# Patient Record
Sex: FEMALE | Race: WHITE | Employment: FULL TIME | URBAN - METROPOLITAN AREA
[De-identification: names, ages, dates, MRNs, and addresses within clinical notes are randomized per-mention and may not be internally consistent; named-entity substitution may affect disease eponyms.]

---

## 2017-02-20 RX ORDER — TRAMADOL HYDROCHLORIDE 50 MG/1
50 TABLET ORAL
Qty: 60 TAB | Refills: 1 | OUTPATIENT
Start: 2017-02-20 | End: 2017-06-02 | Stop reason: SDUPTHER

## 2017-03-24 RX ORDER — DULOXETIN HYDROCHLORIDE 60 MG/1
CAPSULE, DELAYED RELEASE ORAL
Qty: 30 CAP | Refills: 11 | Status: SHIPPED | OUTPATIENT
Start: 2017-03-24 | End: 2018-03-23 | Stop reason: SDUPTHER

## 2017-03-24 RX ORDER — LISINOPRIL AND HYDROCHLOROTHIAZIDE 12.5; 2 MG/1; MG/1
TABLET ORAL
Qty: 30 TAB | Refills: 11 | Status: SHIPPED | OUTPATIENT
Start: 2017-03-24 | End: 2018-03-23 | Stop reason: SDUPTHER

## 2017-05-12 ENCOUNTER — HOSPITAL ENCOUNTER (EMERGENCY)
Age: 57
Discharge: HOME | End: 2017-05-12
Attending: FAMILY MEDICINE

## 2017-05-12 VITALS
SYSTOLIC BLOOD PRESSURE: 155 MMHG | OXYGEN SATURATION: 99 % | RESPIRATION RATE: 18 BRPM | TEMPERATURE: 98.1 F | HEIGHT: 64 IN | DIASTOLIC BLOOD PRESSURE: 89 MMHG | HEART RATE: 74 BPM | BODY MASS INDEX: 44.73 KG/M2 | WEIGHT: 262 LBS

## 2017-05-12 DIAGNOSIS — H66.90 ACUTE OTITIS MEDIA, UNSPECIFIED LATERALITY, UNSPECIFIED OTITIS MEDIA TYPE: Primary | ICD-10-CM

## 2017-05-12 RX ORDER — AMOXICILLIN 875 MG/1
875 TABLET, FILM COATED ORAL 2 TIMES DAILY
Qty: 20 TAB | Refills: 0 | Status: SHIPPED | OUTPATIENT
Start: 2017-05-12 | End: 2017-05-22

## 2017-05-12 NOTE — DISCHARGE INSTRUCTIONS
Ear Infection (Otitis Media): Care Instructions  Your Care Instructions    An ear infection may start with a cold and affect the middle ear (otitis media). It can hurt a lot. Most ear infections clear up on their own in a couple of days. Most often you will not need antibiotics. This is because many ear infections are caused by a virus. Antibiotics don't work against a virus. Regular doses of pain medicines are the best way to reduce your fever and help you feel better. Follow-up care is a key part of your treatment and safety. Be sure to make and go to all appointments, and call your doctor if you are having problems. It's also a good idea to know your test results and keep a list of the medicines you take. How can you care for yourself at home? · Take pain medicines exactly as directed. ¨ If the doctor gave you a prescription medicine for pain, take it as prescribed. ¨ If you are not taking a prescription pain medicine, take an over-the-counter medicine, such as acetaminophen (Tylenol), ibuprofen (Advil, Motrin), or naproxen (Aleve). Read and follow all instructions on the label. ¨ Do not take two or more pain medicines at the same time unless the doctor told you to. Many pain medicines have acetaminophen, which is Tylenol. Too much acetaminophen (Tylenol) can be harmful. · Plan to take a full dose of pain reliever before bedtime. Getting enough sleep will help you get better. · Try a warm, moist washcloth on the ear. It may help relieve pain. · If your doctor prescribed antibiotics, take them as directed. Do not stop taking them just because you feel better. You need to take the full course of antibiotics. When should you call for help? Call your doctor now or seek immediate medical care if:  · You have new or increasing ear pain. · You have new or increasing pus or blood draining from your ear. · You have a fever with a stiff neck or a severe headache.   Watch closely for changes in your health, and be sure to contact your doctor if:  · You have new or worse symptoms. · You are not getting better after taking an antibiotic for 2 days. Where can you learn more? Go to http://chandrakant-[place].info/. Enter Z094 in the search box to learn more about \"Ear Infection (Otitis Media): Care Instructions. \"  Current as of: July 29, 2016  Content Version: 11.2  © 0705-2132 [employer]. Care instructions adapted under license by [employer] (which disclaims liability or warranty for this information). If you have questions about a medical condition or this instruction, always ask your healthcare professional. [address] any warranty or liability for your use of this information.

## 2017-05-12 NOTE — UC PROVIDER NOTE
Patient is a 59 y.o. female presenting with ear pain. Ear Pain    This is a new problem. The current episode started more than 1 week ago. The problem occurs daily. The problem has not changed since onset. Patient complains that the right ear is affected. There has been no fever. Associated symptoms include abdominal pain. Pertinent negatives include no ear discharge, no headaches, no hearing loss, no rhinorrhea, no sore throat and no cough. Past Medical History:   Diagnosis Date    Arthritis     Knees    Environmental allergies     Hives     HTN (hypertension) 5/20/2013        Past Surgical History:   Procedure Laterality Date    HX GYN      3 pregnancies, 3 children         Family History   Problem Relation Age of Onset    Hypertension Mother     Diabetes Mother     Cancer Father      lung    Diabetes Sister     Hypertension Sister         Social History     Social History    Marital status:      Spouse name: N/A    Number of children: N/A    Years of education: N/A     Occupational History    Not on file. Social History Main Topics    Smoking status: Never Smoker    Smokeless tobacco: Never Used    Alcohol use Yes      Comment: rare    Drug use: No    Sexual activity: Yes     Birth control/ protection: None     Other Topics Concern    Not on file     Social History Narrative                ALLERGIES: Review of patient's allergies indicates no known allergies. Review of Systems   Constitutional: Negative for chills. HENT: Positive for congestion and ear pain. Negative for ear discharge, hearing loss, rhinorrhea and sore throat. Respiratory: Negative for cough. Gastrointestinal: Positive for abdominal pain. Neurological: Negative for headaches.        Vitals:    05/12/17 1425 05/12/17 1427   BP:  155/89   Pulse:  74   Resp:  18   Temp:  98.1 °F (36.7 °C)   SpO2:  99%   Weight: 118.8 kg (262 lb)    Height: 5' 4\" (1.626 m)        Physical Exam   Constitutional: She is oriented to person, place, and time. She appears well-developed and well-nourished. HENT:   Left Ear: External ear normal.   Right TM erythematous   Eyes: EOM are normal.   Neck: Normal range of motion. Neck supple. Cardiovascular: Normal rate, regular rhythm and normal heart sounds. Pulmonary/Chest: Effort normal and breath sounds normal. No respiratory distress. She has no wheezes. She has no rales. Neurological: She is alert and oriented to person, place, and time. Skin: Skin is warm and dry. Psychiatric: She has a normal mood and affect. Her behavior is normal. Judgment and thought content normal.   Nursing note and vitals reviewed. MDM     Differential Diagnosis; Clinical Impression; Plan:     CLINICAL IMPRESSION:  Acute otitis media, unspecified laterality, unspecified otitis media type  (primary encounter diagnosis)    Plan:  1. Amoxil 875  2.   3.   Risk of Significant Complications, Morbidity, and/or Mortality:   Presenting problems: Moderate  Diagnostic procedures: Moderate  Management options:   Moderate  Progress:   Patient progress:  Stable      Procedures

## 2017-05-23 DIAGNOSIS — F51.01 PRIMARY INSOMNIA: ICD-10-CM

## 2017-05-24 RX ORDER — TRAZODONE HYDROCHLORIDE 50 MG/1
TABLET ORAL
Qty: 60 TAB | Refills: 1 | Status: SHIPPED | OUTPATIENT
Start: 2017-05-24 | End: 2017-09-12 | Stop reason: SDUPTHER

## 2017-05-25 RX ORDER — TOPIRAMATE 100 MG/1
TABLET, FILM COATED ORAL
Qty: 60 TAB | Refills: 5 | Status: SHIPPED | OUTPATIENT
Start: 2017-05-25 | End: 2017-11-22 | Stop reason: SDUPTHER

## 2017-06-04 RX ORDER — TRAMADOL HYDROCHLORIDE 50 MG/1
50 TABLET ORAL
Qty: 60 TAB | Refills: 1 | OUTPATIENT
Start: 2017-06-04 | End: 2017-08-07 | Stop reason: SDUPTHER

## 2017-06-26 ENCOUNTER — APPOINTMENT (OUTPATIENT)
Dept: INTERNAL MEDICINE CLINIC | Age: 57
End: 2017-06-26

## 2017-06-26 DIAGNOSIS — E78.00 HYPERCHOLESTEROLEMIA: ICD-10-CM

## 2017-06-27 LAB
ALBUMIN SERPL-MCNC: 4.2 G/DL (ref 3.5–5.5)
ALBUMIN/GLOB SERPL: 1.8 {RATIO} (ref 1.2–2.2)
ALP SERPL-CCNC: 85 IU/L (ref 39–117)
ALT SERPL-CCNC: 13 IU/L (ref 0–32)
AST SERPL-CCNC: 12 IU/L (ref 0–40)
BILIRUB SERPL-MCNC: 0.2 MG/DL (ref 0–1.2)
BUN SERPL-MCNC: 27 MG/DL (ref 6–24)
BUN/CREAT SERPL: 29 (ref 9–23)
CALCIUM SERPL-MCNC: 9.4 MG/DL (ref 8.7–10.2)
CHLORIDE SERPL-SCNC: 102 MMOL/L (ref 96–106)
CHOLEST SERPL-MCNC: 164 MG/DL (ref 100–199)
CO2 SERPL-SCNC: 26 MMOL/L (ref 18–29)
CREAT SERPL-MCNC: 0.92 MG/DL (ref 0.57–1)
GLOBULIN SER CALC-MCNC: 2.3 G/DL (ref 1.5–4.5)
GLUCOSE SERPL-MCNC: 103 MG/DL (ref 65–99)
HDLC SERPL-MCNC: 61 MG/DL
INTERPRETATION, 910389: NORMAL
LDLC SERPL CALC-MCNC: 82 MG/DL (ref 0–99)
POTASSIUM SERPL-SCNC: 4.1 MMOL/L (ref 3.5–5.2)
PROT SERPL-MCNC: 6.5 G/DL (ref 6–8.5)
SODIUM SERPL-SCNC: 144 MMOL/L (ref 134–144)
TRIGL SERPL-MCNC: 107 MG/DL (ref 0–149)
VLDLC SERPL CALC-MCNC: 21 MG/DL (ref 5–40)

## 2017-07-11 ENCOUNTER — OFFICE VISIT (OUTPATIENT)
Dept: INTERNAL MEDICINE CLINIC | Age: 57
End: 2017-07-11

## 2017-07-11 VITALS
TEMPERATURE: 98.5 F | RESPIRATION RATE: 14 BRPM | DIASTOLIC BLOOD PRESSURE: 68 MMHG | WEIGHT: 269 LBS | HEART RATE: 72 BPM | HEIGHT: 64 IN | BODY MASS INDEX: 45.93 KG/M2 | SYSTOLIC BLOOD PRESSURE: 133 MMHG | OXYGEN SATURATION: 97 %

## 2017-07-11 DIAGNOSIS — I10 HYPERTENSION, ESSENTIAL, BENIGN: Primary | ICD-10-CM

## 2017-07-11 DIAGNOSIS — E66.01 MORBID OBESITY WITH BMI OF 45.0-49.9, ADULT (HCC): ICD-10-CM

## 2017-07-11 DIAGNOSIS — E78.00 HYPERCHOLESTEROLEMIA: ICD-10-CM

## 2017-07-11 DIAGNOSIS — M17.0 PRIMARY OSTEOARTHRITIS OF BOTH KNEES: ICD-10-CM

## 2017-07-11 DIAGNOSIS — R73.9 HYPERGLYCEMIA: ICD-10-CM

## 2017-07-11 DIAGNOSIS — F41.9 ANXIETY: ICD-10-CM

## 2017-07-11 LAB — HBA1C MFR BLD HPLC: 5.6 % (ref 4.8–5.6)

## 2017-07-11 RX ORDER — ACETAMINOPHEN 325 MG/1
TABLET ORAL
COMMUNITY
End: 2017-12-19

## 2017-07-11 NOTE — PATIENT INSTRUCTIONS
Office visit in 6 months. Anxiety Disorder: Care Instructions  Your Care Instructions  Anxiety is a normal reaction to stress. Difficult situations can cause you to have symptoms such as sweaty palms and a nervous feeling. In an anxiety disorder, the symptoms are far more severe. Constant worry, muscle tension, trouble sleeping, nausea and diarrhea, and other symptoms can make normal daily activities difficult or impossible. These symptoms may occur for no reason, and they can affect your work, school, or social life. Medicines, counseling, and self-care can all help. Follow-up care is a key part of your treatment and safety. Be sure to make and go to all appointments, and call your doctor if you are having problems. It's also a good idea to know your test results and keep a list of the medicines you take. How can you care for yourself at home? · Take medicines exactly as directed. Call your doctor if you think you are having a problem with your medicine. · Go to your counseling sessions and follow-up appointments. · Recognize and accept your anxiety. Then, when you are in a situation that makes you anxious, say to yourself, \"This is not an emergency. I feel uncomfortable, but I am not in danger. I can keep going even if I feel anxious. \"  · Be kind to your body:  ¨ Relieve tension with exercise or a massage. ¨ Get enough rest.  ¨ Avoid alcohol, caffeine, nicotine, and illegal drugs. They can increase your anxiety level and cause sleep problems. ¨ Learn and do relaxation techniques. See below for more about these techniques. · Engage your mind. Get out and do something you enjoy. Go to a funny movie, or take a walk or hike. Plan your day. Having too much or too little to do can make you anxious. · Keep a record of your symptoms. Discuss your fears with a good friend or family member, or join a support group for people with similar problems. Talking to others sometimes relieves stress.   · Get involved in social groups, or volunteer to help others. Being alone sometimes makes things seem worse than they are. · Get at least 30 minutes of exercise on most days of the week to relieve stress. Walking is a good choice. You also may want to do other activities, such as running, swimming, cycling, or playing tennis or team sports. Relaxation techniques  Do relaxation exercises 10 to 20 minutes a day. You can play soothing, relaxing music while you do them, if you wish. · Tell others in your house that you are going to do your relaxation exercises. Ask them not to disturb you. · Find a comfortable place, away from all distractions and noise. · Lie down on your back, or sit with your back straight. · Focus on your breathing. Make it slow and steady. · Breathe in through your nose. Breathe out through either your nose or mouth. · Breathe deeply, filling up the area between your navel and your rib cage. Breathe so that your belly goes up and down. · Do not hold your breath. · Breathe like this for 5 to 10 minutes. Notice the feeling of calmness throughout your whole body. As you continue to breathe slowly and deeply, relax by doing the following for another 5 to 10 minutes:  · Tighten and relax each muscle group in your body. You can begin at your toes and work your way up to your head. · Imagine your muscle groups relaxing and becoming heavy. · Empty your mind of all thoughts. · Let yourself relax more and more deeply. · Become aware of the state of calmness that surrounds you. · When your relaxation time is over, you can bring yourself back to alertness by moving your fingers and toes and then your hands and feet and then stretching and moving your entire body. Sometimes people fall asleep during relaxation, but they usually wake up shortly afterward.   · Always give yourself time to return to full alertness before you drive a car or do anything that might cause an accident if you are not fully alert. Never play a relaxation tape while you drive a car. When should you call for help? Call 911 anytime you think you may need emergency care. For example, call if:  · You feel you cannot stop from hurting yourself or someone else. Keep the numbers for these national suicide hotlines: 8-772-978-TALK (5-683.683.8726) and 2-266-NGIFZCV (1-556.293.5753). If you or someone you know talks about suicide or feeling hopeless, get help right away. Watch closely for changes in your health, and be sure to contact your doctor if:  · You have anxiety or fear that affects your life. · You have symptoms of anxiety that are new or different from those you had before. Where can you learn more? Go to http://chandrakant-[place].info/. Enter P754 in the search box to learn more about \"Anxiety Disorder: Care Instructions. \"  Current as of: July 26, 2016  Content Version: 11.3  © 1887-5592 [employer]. Care instructions adapted under license by [employer] (which disclaims liability or warranty for this information). If you have questions about a medical condition or this instruction, always ask your healthcare professional. [address] any warranty or liability for your use of this information.

## 2017-07-11 NOTE — PROGRESS NOTES
HISTORY OF PRESENT ILLNESS  Lisa Maria is a 59 y.o. female. HPI   She presents for follow up of hypertension, hyperlipidemia and obesity. Has gained 24 lbs over past year due to stress, but still 51 lbs below her maximum weight. Stress is coming under control   Diet and Lifestyle: generally follows a low fat low cholesterol diet, generally follows a low sodium diet, exercises sporadically (walks about 5,000 steps a day), nonsmoker  Medication compliance: compliant all of the time  Medication side effects: none  Home BP Monitoring:  is not measured at home  Cardiovascular ROS:  She complains of dizziness. She denies palpitations, orthopnea, exertional chest pressure/discomfort, claudication, lower extremity edema, dyspnea on exertion      She presents for follow up of anxiety anxiety disorder. Has felt down because  has worsening memory problems. Ongoing symptoms include:  Feels like everyone, work and home dumps everything on her to fix. Many things came at once to fix. Sweating and grinding teeth. Patient denies: palpitations, chest pain, shortness of breath, racing thoughts, feelings of losing control. Reported side effects from the treatment: none.     Patient Active Problem List   Diagnosis Code    Hypertension, essential, benign I10    Osteoarthritis of both knees M17.0    Obesity, morbid, [facility] 40.0[facility]49.9 [facility] E66.01    Environmental allergies Z91.09    Hypercholesterolemia E78.00    Anxiety F41.9     Past Medical History:   Diagnosis Date    Arthritis     Knees    Environmental allergies     Hives     HTN (hypertension) 5/20/2013     Past Surgical History:   Procedure Laterality Date    HX GYN      3 pregnancies, 3 children     Social History     Social History    Marital status:      Spouse name: N/A    Number of children: N/A    Years of education: N/A     Social History Main Topics    Smoking status: Never Smoker    Smokeless tobacco: Never Used    Alcohol use Yes Comment: rare    Drug use: No    Sexual activity: Yes     Birth control/ protection: None     Other Topics Concern    None     Social History Narrative     Family History   Problem Relation Age of Onset    Hypertension Mother     Diabetes Mother     Cancer Father      lung    Diabetes Sister     Hypertension Sister      No Known Allergies  Current Outpatient Prescriptions   Medication Sig Dispense Refill    acetaminophen (TYLENOL) 325 mg tablet Take  by mouth every four (4) hours as needed for Pain.  traMADol (ULTRAM) 50 mg tablet Take 1 Tab by mouth every six (6) hours as needed for Pain. Max Daily Amount: 200 mg. 60 Tab 1    topiramate (TOPAMAX) 100 mg tablet take 1 tablet by mouth twice a day 60 Tab 5    traZODone (DESYREL) 50 mg tablet 2 tablets qhs 60 Tab 1    DULoxetine (CYMBALTA) 60 mg capsule take 1 capsule by mouth once daily 30 Cap 11    lisinopril-hydroCHLOROthiazide (PRINZIDE, ZESTORETIC) 20-12.5 mg per tablet take 1 tablet by mouth once daily 30 Tab 11    pravastatin (PRAVACHOL) 20 mg tablet take 1 tablet by mouth NIGHTLY 30 Tab 11    loratadine (CLARITIN) 10 mg tablet Take 10 mg by mouth daily.  fluticasone (FLONASE) 50 mcg/actuation nasal spray 2 Sprays by Both Nostrils route two (2) times a day. 2 Bottle prn       Review of Systems   Constitutional: Negative for malaise/fatigue and weight loss. Gastrointestinal: Negative for constipation and diarrhea. Musculoskeletal: Positive for joint pain (knees). Negative for back pain. Neurological: Negative for dizziness, tingling and focal weakness. Visit Vitals    /68 (BP 1 Location: Left arm, BP Patient Position: Sitting)    Pulse 72    Temp 98.5 °F (36.9 °C) (Oral)    Resp 14    Ht 5' 4\" (1.626 m)    Wt 269 lb (122 kg)    SpO2 97%    BMI 46.17 kg/m2     Physical Exam   Constitutional: She is oriented to person, place, and time. She appears well-developed and well-nourished.    HENT:   Head: Normocephalic and atraumatic. Eyes: Conjunctivae are normal. Pupils are equal, round, and reactive to light. Neck: Neck supple. Carotid bruit is not present. No thyromegaly present. Cardiovascular: Normal rate, regular rhythm and normal heart sounds. PMI is not displaced. Exam reveals no gallop. No murmur heard. Pulses:       Dorsalis pedis pulses are 2+ on the right side, and 2+ on the left side. Posterior tibial pulses are 2+ on the right side, and 2+ on the left side. Pulmonary/Chest: Effort normal. She has no wheezes. She has no rhonchi. She has no rales. Abdominal: Soft. Normal appearance. She exhibits no abdominal bruit and no mass. There is no hepatosplenomegaly. There is no tenderness. Musculoskeletal: She exhibits no edema. Lymphadenopathy:     She has no cervical adenopathy. Right: No supraclavicular adenopathy present. Left: No supraclavicular adenopathy present. Neurological: She is alert and oriented to person, place, and time. No sensory deficit. Skin: Skin is warm, dry and intact. No rash noted. Psychiatric: She has a normal mood and affect. Her behavior is normal.   Nursing note and vitals reviewed.       Results for orders placed or performed in visit on 07/11/17   AMB POC HEMOGLOBIN A1C   Result Value Ref Range    Hemoglobin A1c (POC) 5.6 4.8 - 5.6 %       Results for orders placed or performed in visit on 20/71/75   METABOLIC PANEL, COMPREHENSIVE   Result Value Ref Range    Glucose 103 (H) 65 - 99 mg/dL    BUN 27 (H) 6 - 24 mg/dL    Creatinine 0.92 0.57 - 1.00 mg/dL    GFR est non-AA 68 >59 mL/min/1.73    GFR est AA 79 >59 mL/min/1.73    BUN/Creatinine ratio 29 (H) 9 - 23    Sodium 144 134 - 144 mmol/L    Potassium 4.1 3.5 - 5.2 mmol/L    Chloride 102 96 - 106 mmol/L    CO2 26 18 - 29 mmol/L    Calcium 9.4 8.7 - 10.2 mg/dL    Protein, total 6.5 6.0 - 8.5 g/dL    Albumin 4.2 3.5 - 5.5 g/dL    GLOBULIN, TOTAL 2.3 1.5 - 4.5 g/dL    A-G Ratio 1.8 1.2 - 2.2    Bilirubin, total 0.2 0.0 - 1.2 mg/dL    Alk. phosphatase 85 39 - 117 IU/L    AST (SGOT) 12 0 - 40 IU/L    ALT (SGPT) 13 0 - 32 IU/L   LIPID PANEL   Result Value Ref Range    Cholesterol, total 164 100 - 199 mg/dL    Triglyceride 107 0 - 149 mg/dL    HDL Cholesterol 61 >39 mg/dL    VLDL, calculated 21 5 - 40 mg/dL    LDL, calculated 82 0 - 99 mg/dL   CVD REPORT   Result Value Ref Range    INTERPRETATION Note        ASSESSMENT and PLAN    ICD-10-CM ICD-9-CM    1. Hypertension, essential, benign I10 401.1    2. Hypercholesterolemia E78.00 272.0    3. Hyperglycemia R73.9 790.29 AMB POC HEMOGLOBIN A1C   4. Primary osteoarthritis of both knees M17.0 715.16    5. Anxiety F41.9 300.00          Hypertension, essential, benign  Hypertension is controlled    Hypercholesterolemia  Hyperlipidemia is controlled    Hyperglycemia  Hemoglobin A1c at upper limits of normal.   -     AMB POC HEMOGLOBIN A1C    Primary osteoarthritis of both knees    Anxiety  Primarily situational    Follow-up Disposition:  Return in about 6 months (around 1/11/2018) for HTN, chol, anx.  lab results and schedule of future lab studies reviewed with patient  reviewed diet, exercise and weight control  cardiovascular risk and specific lipid/LDL goals reviewed  Discussed the patient's BMI with her. The BMI follow up plan is as follows: I have counseled this patient on diet and exercise regimens  I have discussed the diagnosis with the patient and the intended plan as seen in the above orders. Patient is in agreement. The patient has received an after-visit summary and questions were answered concerning future plans. I have discussed medication side effects and warnings with the patient as well.

## 2017-07-11 NOTE — MR AVS SNAPSHOT
Visit Information Date & Time Provider Department Dept. Phone Encounter #  
 7/11/2017  3:00 PM Alise Dyer, [facility] [phone] [id number] Follow-up Instructions Return in about 6 months (around 1/11/2018) for HTN, chol, anx. Upcoming Health Maintenance Date Due COLONOSCOPY 3/22/1976 BREAST CANCER SCRN MAMMOGRAM 3/19/2016 PAP AKA CERVICAL CYTOLOGY 3/19/2017 INFLUENZA AGE 9 TO ADULT 8/1/2017 DTaP/Tdap/Td series (2 - Td) 11/10/2024 Allergies as of 7/11/2017  Review Complete On: 7/11/2017 By: Alise Dyer MD  
 No Known Allergies Current Immunizations  Reviewed on 7/11/2017 Name Date Influenza Vaccine 8/26/2013, 12/1/2012 Influenza Vaccine (Quad) PF 11/15/2016, 11/10/2014 Tdap 11/10/2014 Reviewed by Cecilio Smith LPN on 6/94/2802 at  3:05 PM  
You Were Diagnosed With   
  
 Codes Comments Hyperglycemia    -  Primary ICD-10-CM: R73.9 ICD-9-CM: 790.29 Hypertension, essential, benign     ICD-10-CM: I10 
ICD-9-CM: 401.1 Hypercholesterolemia     ICD-10-CM: E78.00 ICD-9-CM: 272.0 Primary osteoarthritis of both knees     ICD-10-CM: M17.0 ICD-9-CM: 715.16 Anxiety     ICD-10-CM: F41.9 ICD-9-CM: 300.00 Vitals BP Pulse Temp Resp Height(growth percentile) Weight(growth percentile) 133/68 (BP 1 Location: Left arm, BP Patient Position: Sitting) 72 98.5 °F (36.9 °C) (Oral) 14 5' 4\" (1.626 m) 269 lb (122 kg) SpO2 BMI OB Status Smoking Status 97% 46.17 kg/m2 Ablation Never Smoker Vitals History BMI and BSA Data Body Mass Index Body Surface Area  
 46.17 kg/m 2 2.35 m 2 Preferred Pharmacy Pharmacy Name Phone [id number] [address] [phone] Your Updated Medication List  
  
   
This list is accurate as of: 7/11/17  4:32 PM.  Always use your most recent med list.  
  
  
  
  
 DULoxetine 60 mg capsule Commonly known as:  CYMBALTA  
take 1 capsule by mouth once daily  
  
 fluticasone 50 mcg/actuation nasal spray Commonly known as:  Enterprise Capes 2 Sprays by Both Nostrils route two (2) times a day. lisinopril-hydroCHLOROthiazide 20-12.5 mg per tablet Commonly known as:  PRINZIDE, ZESTORETIC  
take 1 tablet by mouth once daily  
  
 loratadine 10 mg tablet Commonly known as:  Bula Holms Take 10 mg by mouth daily. pravastatin 20 mg tablet Commonly known as:  PRAVACHOL  
take 1 tablet by mouth NIGHTLY  
  
 topiramate 100 mg tablet Commonly known as:  TOPAMAX  
take 1 tablet by mouth twice a day  
  
 traMADol 50 mg tablet Commonly known as:  ULTRAM  
Take 1 Tab by mouth every six (6) hours as needed for Pain. Max Daily Amount: 200 mg.  
  
 traZODone 50 mg tablet Commonly known as:  DESYREL  
2 tablets qhs  
  
 TYLENOL 325 mg tablet Generic drug:  acetaminophen Take  by mouth every four (4) hours as needed for Pain. We Performed the Following AMB POC HEMOGLOBIN A1C [85004 CPT(R)] Follow-up Instructions Return in about 6 months (around 1/11/2018) for HTN, chol, anx. Patient Instructions Office visit in 6 months. Anxiety Disorder: Care Instructions Your Care Instructions Anxiety is a normal reaction to stress. Difficult situations can cause you to have symptoms such as sweaty palms and a nervous feeling. In an anxiety disorder, the symptoms are far more severe. Constant worry, muscle tension, trouble sleeping, nausea and diarrhea, and other symptoms can make normal daily activities difficult or impossible. These symptoms may occur for no reason, and they can affect your work, school, or social life. Medicines, counseling, and self-care can all help. Follow-up care is a key part of your treatment and safety.  Be sure to make and go to all appointments, and call your doctor if you are having problems. It's also a good idea to know your test results and keep a list of the medicines you take. How can you care for yourself at home? · Take medicines exactly as directed. Call your doctor if you think you are having a problem with your medicine. · Go to your counseling sessions and follow-up appointments. · Recognize and accept your anxiety. Then, when you are in a situation that makes you anxious, say to yourself, \"This is not an emergency. I feel uncomfortable, but I am not in danger. I can keep going even if I feel anxious. \" · Be kind to your body: ¨ Relieve tension with exercise or a massage. ¨ Get enough rest. 
¨ Avoid alcohol, caffeine, nicotine, and illegal drugs. They can increase your anxiety level and cause sleep problems. ¨ Learn and do relaxation techniques. See below for more about these techniques. · Engage your mind. Get out and do something you enjoy. Go to a funny movie, or take a walk or hike. Plan your day. Having too much or too little to do can make you anxious. · Keep a record of your symptoms. Discuss your fears with a good friend or family member, or join a support group for people with similar problems. Talking to others sometimes relieves stress. · Get involved in social groups, or volunteer to help others. Being alone sometimes makes things seem worse than they are. · Get at least 30 minutes of exercise on most days of the week to relieve stress. Walking is a good choice. You also may want to do other activities, such as running, swimming, cycling, or playing tennis or team sports. Relaxation techniques Do relaxation exercises 10 to 20 minutes a day. You can play soothing, relaxing music while you do them, if you wish. · Tell others in your house that you are going to do your relaxation exercises. Ask them not to disturb you. · Find a comfortable place, away from all distractions and noise. · Lie down on your back, or sit with your back straight. · Focus on your breathing. Make it slow and steady. · Breathe in through your nose. Breathe out through either your nose or mouth. · Breathe deeply, filling up the area between your navel and your rib cage. Breathe so that your belly goes up and down. · Do not hold your breath. · Breathe like this for 5 to 10 minutes. Notice the feeling of calmness throughout your whole body. As you continue to breathe slowly and deeply, relax by doing the following for another 5 to 10 minutes: · Tighten and relax each muscle group in your body. You can begin at your toes and work your way up to your head. · Imagine your muscle groups relaxing and becoming heavy. · Empty your mind of all thoughts. · Let yourself relax more and more deeply. · Become aware of the state of calmness that surrounds you. · When your relaxation time is over, you can bring yourself back to alertness by moving your fingers and toes and then your hands and feet and then stretching and moving your entire body. Sometimes people fall asleep during relaxation, but they usually wake up shortly afterward. · Always give yourself time to return to full alertness before you drive a car or do anything that might cause an accident if you are not fully alert. Never play a relaxation tape while you drive a car. When should you call for help? Call 911 anytime you think you may need emergency care. For example, call if: 
· You feel you cannot stop from hurting yourself or someone else. Keep the numbers for these national suicide hotlines: 1-396-236-TALK (4-506.724.5677) and 4-882-JWOZFUX (0-428.224.7568). If you or someone you know talks about suicide or feeling hopeless, get help right away. Watch closely for changes in your health, and be sure to contact your doctor if: 
· You have anxiety or fear that affects your life. · You have symptoms of anxiety that are new or different from those you had before. Where can you learn more? Go to http://chandrakant-charlotte.info/. Enter P754 in the search box to learn more about \"Anxiety Disorder: Care Instructions. \" Current as of: July 26, 2016 Content Version: 11.3 © 3371-7291 [employer]. Care instructions adapted under license by [employer] (which disclaims liability or warranty for this information). If you have questions about a medical condition or this instruction, always ask your healthcare professional. [address] any warranty or liability for your use of this information. Introducing [facility]! Dear Kris Wang: Thank you for requesting a [facility] account. Our records indicate that you already have an active [facility]it account. You can access your account anytime at [email or web address] Did you know that you can access your hospital and ER discharge instructions at any time in PCA Audit? You can also review all of your test results from your hospital stay or ER visit. Additional Information If you have questions, please visit the Frequently Asked Questions section of the [employer] Audit website at [email or web address] Remember, [facility] Audit is NOT to be used for urgent needs. For medical emergencies, dial 911. Now available from your iPhone and Android! Please provide this summary of care documentation to your next provider. Your primary care clinician is listed as Smith Goodman. If you have any questions after today's visit, please call [phone].

## 2017-07-11 NOTE — PROGRESS NOTES
Reviewed record In preparation for visit and have obtained necessary documentation. Will bring a copy of advanced directive / living will. 1. Have you been to the ER, urgent care clinic or hospitalized since your last visit?  5/12/17    2. Have you seen or consulted any other health care providers outside of the [address] [place] [address] since your last visit? Include any pap smears or colon screening. Dr Sherry Fernández reviewed with provider.     Health Maintenance reviewed:

## 2017-07-13 PROBLEM — E66.01 MORBID OBESITY WITH BMI OF 45.0-49.9, ADULT (HCC): Status: ACTIVE | Noted: 2017-07-13

## 2017-08-07 RX ORDER — TRAMADOL HYDROCHLORIDE 50 MG/1
TABLET ORAL
Qty: 60 TAB | Refills: 0 | OUTPATIENT
Start: 2017-08-07 | End: 2017-09-11 | Stop reason: SDUPTHER

## 2017-08-25 DIAGNOSIS — E78.00 HYPERCHOLESTEROLEMIA: ICD-10-CM

## 2017-08-25 RX ORDER — PRAVASTATIN SODIUM 20 MG/1
TABLET ORAL
Qty: 30 TAB | Refills: 11 | Status: SHIPPED | OUTPATIENT
Start: 2017-08-25 | End: 2018-08-19 | Stop reason: SDUPTHER

## 2017-09-08 DIAGNOSIS — Z79.891 LONG TERM (CURRENT) USE OF OPIATE ANALGESIC: Primary | ICD-10-CM

## 2017-09-08 DIAGNOSIS — M17.0 PRIMARY OSTEOARTHRITIS OF BOTH KNEES: ICD-10-CM

## 2017-09-08 NOTE — TELEPHONE ENCOUNTER
Pt states requested Rx 3 days about but [facility] called her and advise her that PCP denied her Rx request, but she states recently seen Dr. Kasie Stokes   Would like to know if able to have a few refills added for Rx

## 2017-09-09 NOTE — TELEPHONE ENCOUNTER
We just discovered that Tramadol falls under new law for prescription of controlled substances. We did not do a detailed pain history as is now required to be done every 3 months. In addition drug screens must be done every 3 months for the first year and every 6 months after that. We will arrange an OV for the required history as soon as feasible. Once we have signed pain management contract and drug screen, prescription can be given to her.

## 2017-09-11 PROBLEM — Z79.891 LONG TERM (CURRENT) USE OF OPIATE ANALGESIC: Status: ACTIVE | Noted: 2017-09-11

## 2017-09-11 PROBLEM — M17.0 PRIMARY OSTEOARTHRITIS OF BOTH KNEES: Status: ACTIVE | Noted: 2017-09-11

## 2017-09-11 RX ORDER — TRAMADOL HYDROCHLORIDE 50 MG/1
TABLET ORAL
Qty: 60 TAB | Refills: 0 | Status: SHIPPED | OUTPATIENT
Start: 2017-09-11 | End: 2017-12-19

## 2017-09-11 RX ORDER — TRAMADOL HYDROCHLORIDE 50 MG/1
TABLET ORAL
Qty: 60 TAB | Refills: 0 | Status: SHIPPED | OUTPATIENT
Start: 2017-09-11 | End: 2017-09-11 | Stop reason: SDUPTHER

## 2017-09-12 ENCOUNTER — OFFICE VISIT (OUTPATIENT)
Dept: INTERNAL MEDICINE CLINIC | Age: 57
End: 2017-09-12

## 2017-09-12 VITALS
DIASTOLIC BLOOD PRESSURE: 78 MMHG | SYSTOLIC BLOOD PRESSURE: 142 MMHG | WEIGHT: 270 LBS | HEART RATE: 68 BPM | RESPIRATION RATE: 16 BRPM | HEIGHT: 64 IN | BODY MASS INDEX: 46.1 KG/M2 | OXYGEN SATURATION: 98 % | TEMPERATURE: 98.2 F

## 2017-09-12 DIAGNOSIS — F51.01 PRIMARY INSOMNIA: ICD-10-CM

## 2017-09-12 DIAGNOSIS — Z23 ENCOUNTER FOR IMMUNIZATION: ICD-10-CM

## 2017-09-12 DIAGNOSIS — G89.29 ENCOUNTER FOR CHRONIC PAIN MANAGEMENT: Primary | ICD-10-CM

## 2017-09-12 DIAGNOSIS — Z79.891 LONG TERM (CURRENT) USE OF OPIATE ANALGESIC: ICD-10-CM

## 2017-09-12 DIAGNOSIS — M17.0 PRIMARY OSTEOARTHRITIS OF BOTH KNEES: ICD-10-CM

## 2017-09-12 RX ORDER — TRAZODONE HYDROCHLORIDE 50 MG/1
25 TABLET ORAL
Qty: 30 TAB | Refills: 1
Start: 2017-09-12 | End: 2017-12-19 | Stop reason: SDUPTHER

## 2017-09-12 NOTE — PROGRESS NOTES
Reviewed record In preparation for visit and have obtained necessary documentation. Has info on advanced directive but has not filled them out. 1. Have you been to the ER, urgent care clinic or hospitalized since your last visit? No     2. Have you seen or consulted any other health care providers outside of the [address] [place] [address] since your last visit? Include any pap smears or colon screening. No    Vitals reviewed with provider. Health Maintenance reviewed: After verbal order read back of , patient received Flu Shot in left arm,  [address] [id number] Lot [id number] Exp 4/30/18. Patient tolerated procedure without complaints and received VIS.

## 2017-09-12 NOTE — PATIENT INSTRUCTIONS
Keep appointment in January      Vaccine Information Statement    Influenza (Flu) Vaccine (Inactivated or Recombinant): What you need to know    Many Vaccine Information Statements are available in Turkmen and other languages. See www.immunize.org/vis  Hojas de Información Sobre Vacunas están disponibles en Español y en muchos otros idiomas. Visite www.immunize.org/vis    1. Why get vaccinated? Influenza (flu) is a contagious disease that spreads around the [facility] every year, usually between October and May. Flu is caused by influenza viruses, and is spread mainly by coughing, sneezing, and close contact. Anyone can get flu. Flu strikes suddenly and can last several days. Symptoms vary by age, but can include:   fever/chills   sore throat   muscle aches   fatigue   cough   headache    runny or stuffy nose    Flu can also lead to pneumonia and blood infections, and cause diarrhea and seizures in children. If you have a medical condition, such as heart or lung disease, flu can make it worse. Flu is more dangerous for some people. Infants and young children, people 72years of age and older, pregnant women, and people with certain health conditions or a weakened immune system are at greatest risk. Each year thousands of people in the [facility] die from flu, and many more are hospitalized. Flu vaccine can:   keep you from getting flu,   make flu less severe if you do get it, and   keep you from spreading flu to your family and other people. 2. Inactivated and recombinant flu vaccines    A dose of flu vaccine is recommended every flu season. Children 6 months through 6years of age may need two doses during the same flu season. Everyone else needs only one dose each flu season.        Some inactivated flu vaccines contain a very small amount of a mercury-based preservative called thimerosal. Studies have not shown thimerosal in vaccines to be harmful, but flu vaccines that do not contain thimerosal are available. There is no live flu virus in flu shots. They cannot cause the flu. There are many flu viruses, and they are always changing. Each year a new flu vaccine is made to protect against three or four viruses that are likely to cause disease in the upcoming flu season. But even when the vaccine doesnt exactly match these viruses, it may still provide some protection    Flu vaccine cannot prevent:   flu that is caused by a virus not covered by the vaccine, or   illnesses that look like flu but are not. It takes about 2 weeks for protection to develop after vaccination, and protection lasts through the flu season. 3. Some people should not get this vaccine    Tell the person who is giving you the vaccine:     If you have any severe, life-threatening allergies. If you ever had a life-threatening allergic reaction after a dose of flu vaccine, or have a severe allergy to any part of this vaccine, you may be advised not to get vaccinated. Most, but not all, types of flu vaccine contain a small amount of egg protein.  If you ever had Guillain-Barré Syndrome (also called GBS). Some people with a history of GBS should not get this vaccine. This should be discussed with your doctor.  If you are not feeling well. It is usually okay to get flu vaccine when you have a mild illness, but you might be asked to come back when you feel better. 4. Risks of a vaccine reaction    With any medicine, including vaccines, there is a chance of reactions. These are usually mild and go away on their own, but serious reactions are also possible. Most people who get a flu shot do not have any problems with it.      Minor problems following a flu shot include:    soreness, redness, or swelling where the shot was given     hoarseness   sore, red or itchy eyes   cough   fever   aches   headache   itching   fatigue  If these problems occur, they usually begin soon after the shot and last 1 or 2 days. More serious problems following a flu shot can include the following:     There may be a small increased risk of Guillain-Barré Syndrome (GBS) after inactivated flu vaccine. This risk has been estimated at 1 or 2 additional cases per million people vaccinated. This is much lower than the risk of severe complications from flu, which can be prevented by flu vaccine.  Young children who get the flu shot along with pneumococcal vaccine (PCV13) and/or DTaP vaccine at the same time might be slightly more likely to have a seizure caused by fever. Ask your doctor for more information. Tell your doctor if a child who is getting flu vaccine has ever had a seizure. Problems that could happen after any injected vaccine:      People sometimes faint after a medical procedure, including vaccination. Sitting or lying down for about 15 minutes can help prevent fainting, and injuries caused by a fall. Tell your doctor if you feel dizzy, or have vision changes or ringing in the ears.  Some people get severe pain in the shoulder and have difficulty moving the arm where a shot was given. This happens very rarely.  Any medication can cause a severe allergic reaction. Such reactions from a vaccine are very rare, estimated at about 1 in a million doses, and would happen within a few minutes to a few hours after the vaccination. As with any medicine, there is a very remote chance of a vaccine causing a serious injury or death. The safety of vaccines is always being monitored. For more information, visit: www.cdc.gov/vaccinesafety/    5. What if there is a serious reaction? What should I look for?  Look for anything that concerns you, such as signs of a severe allergic reaction, very high fever, or unusual behavior.     Signs of a severe allergic reaction can include hives, swelling of the face and throat, difficulty breathing, a fast heartbeat, dizziness, and weakness - usually within a few minutes to a few hours after the vaccination. What should I do?  If you think it is a severe allergic reaction or other emergency that cant wait, call 9-1-1 and get the person to the nearest hospital. Otherwise, call your doctor.  Reactions should be reported to the Vaccine Adverse Event Reporting System (VAERS). Your doctor should file this report, or you can do it yourself through  the VAERS web site at www.vaers. [facility].gov, or by calling 4-524.486.5003. VAERS does not give medical advice. 6. The National Vaccine Injury Compensation Program    The [facility] Vaccine Injury Compensation Program (VICP) is a federal program that was created to compensate people who may have been injured by certain vaccines. Persons who believe they may have been injured by a vaccine can learn about the program and about filing a claim by calling 8-458.487.3183 or visiting the [employer] website at www.[facility].gov/vaccinecompensation. There is a time limit to file a claim for compensation. 7. How can I learn more?  Ask your healthcare provider. He or she can give you the vaccine package insert or suggest other sources of information.  Call your local or state health department.  Contact the Centers for Disease Control and Prevention (CDC):  - Call 2-502.944.3538 (1-800-CDC-INFO) or  - Visit CDCs website at www.cdc.gov/flu    Vaccine Information Statement   Inactivated Influenza Vaccine   8/7/2015  42 RAUL Corea Estimable [id number]    Department of Health and Human Services  Centers for Disease Control and Prevention    Office Use Only       Learning About Prescribed Opioid Medicine for Chronic Pain  Introduction  Opioids are medicines used to relieve moderate to severe pain. They may be used for pain that may go on for a long time, such as for cancer or arthritis. Opioids relieve pain by changing the way your body feels pain and the way you feel about pain. They don't cure a health problem.  But they do help you manage the pain. Your doctor will talk with you about how they fit into your overall treatment plan. Doctors follow a strict set of rules for giving opioids to their patients who have long-term (chronic) pain. This is because these are powerful medicines. They can cause problems if they are not used correctly. They can also be misused. Getting a prescription for an opioid may seem hard to do. You may feel like your doctor doesn't trust you. Your doctor understands this. But the rules are the same for everyone. They help make sure that the medicine will be used safely. What happens before you get a prescription? Your doctor may talk with you about your pain history. He or she may ask about your family history. You may have a physical exam. You may also see a pain specialist. This helps your doctor decide if an opioid is right for you. Your doctor will also review your history of opioid use. Most states have a program that tracks prescriptions. If your state does, your doctor will check it to see if you've had prescriptions for opioids before. You may have a urine test to check for opioids in your system. If you're a woman, you may have a pregnancy test. Opioids are not safe to take if you are pregnant. When your doctor prescribes the medicine, he or she will discuss your treatment plan with you. That includes the risks and benefits of opioid medicines. Your doctor will tell you how much pain relief and improved ability to function you can expect. The goal is to take the lowest dose that improves your pain for the shortest amount of time. Your doctor will also talk with you about other things you can do to help relieve pain. Your doctor may suggest different medicines or other options, such as steroid injections, ice or heat, physical therapy, or ways to reduce stress. You and your doctor will talk about your responsibilities. You'll both sign a written agreement.  You will agree to take your medicine exactly as your doctor tells you to. Lesley Solis also agree to be careful with it and not to share it with others. What happens after you start to use your medicine? Regular follow-up visits to your doctor will help you and your doctor make sure that the medicine is the right treatment for your pain. At every visit, your doctor will check these things:  · Is your pain being controlled? · Are you better able to function and be active? · Are you having any side effects, like constipation, nausea, or being too sleepy? · Are the non-opioid pain control measures working? Are there other things you can try? · Are there any signs that you are misusing your medicine? While you are taking an opioid, you may have drug tests and prescription history checks from time to time. Follow-up care is a key part of your treatment and safety. Be sure to make and go to all appointments, and call your doctor if you are having problems. It's also a good idea to know your test results and keep a list of the medicines you take. Where can you learn more? Go to http://chandrakant-charlotte.info/. Enter D374 in the search box to learn more about \"Learning About Prescribed Opioid Medicine for Chronic Pain. \"  Current as of: November 15, 2016  Content Version: 11.3  © 2704-5201 [employer]. Care instructions adapted under license by [employer] (which disclaims liability or warranty for this information). If you have questions about a medical condition or this instruction, always ask your healthcare professional. [place] [address] any warranty or liability for your use of this information.

## 2017-09-12 NOTE — MR AVS SNAPSHOT
Visit Information Date & Time Provider Department Dept. Phone Encounter #  
 9/12/2017  3:00 PM Roro Ty, [address] [facility] [phone] [id number] Follow-up Instructions Return if symptoms worsen or fail to improve. Your Appointments 1/8/2018  3:30 PM  
ROUTINE CARE with Roro Ty MD  
[address] [facility] [address] [facility]) Appt Note: 6mth f/u;  HTN, chol, anx  
 [address] [facility] [address] [phone]  
  
   
 8 [facility] [address] Upcoming Health Maintenance Date Due COLONOSCOPY 3/22/1976 BREAST CANCER SCRN MAMMOGRAM 3/19/2016 PAP AKA CERVICAL CYTOLOGY 3/19/2017 INFLUENZA AGE 9 TO ADULT 8/1/2017 DTaP/Tdap/Td series (2 - Td) 11/10/2024 Allergies as of 9/12/2017  Review Complete On: 9/12/2017 By: Mickey Hooker LPN No Known Allergies Current Immunizations  Reviewed on 9/12/2017 Name Date Influenza Vaccine 8/26/2013, 12/1/2012 Influenza Vaccine (Quad) PF  Incomplete, 11/15/2016, 11/10/2014 Tdap 11/10/2014 Reviewed by Mickey Hooker LPN on 8/46/7756 at  2:50 PM  
You Were Diagnosed With   
  
 Codes Comments Encounter for chronic pain management    -  Primary ICD-10-CM: G89.29 ICD-9-CM: V65.49 Long term (current) use of opiate analgesic     ICD-10-CM: Z45.271 ICD-9-CM: V58.69 Primary osteoarthritis of both knees     ICD-10-CM: M17.0 ICD-9-CM: 715.16 Primary insomnia     ICD-10-CM: F51.01 
ICD-9-CM: 307.42 Encounter for immunization     ICD-10-CM: A74 ICD-9-CM: V03.89 Vitals BP Pulse Temp Resp Height(growth percentile) Weight(growth percentile) 142/78 (BP 1 Location: Left arm, BP Patient Position: Sitting) 68 98.2 °F (36.8 °C) (Oral) 16 5' 4\" (1.626 m) 270 lb (122.5 kg) SpO2 BMI OB Status Smoking Status 98% 46.35 kg/m2 Ablation Never Smoker BMI and BSA Data Body Mass Index Body Surface Area  
 46.35 kg/m 2 2.35 m 2 Preferred Pharmacy Pharmacy Name Phone [id number] [address] [phone] Your Updated Medication List  
  
   
This list is accurate as of: 9/12/17  3:32 PM.  Always use your most recent med list.  
  
  
  
  
 DULoxetine 60 mg capsule Commonly known as:  CYMBALTA  
take 1 capsule by mouth once daily  
  
 fluticasone 50 mcg/actuation nasal spray Commonly known as:  Steele Almanza 2 Sprays by Both Nostrils route two (2) times a day. lisinopril-hydroCHLOROthiazide 20-12.5 mg per tablet Commonly known as:  PRINZIDE, ZESTORETIC  
take 1 tablet by mouth once daily  
  
 loratadine 10 mg tablet Commonly known as:  Curlee Arms Take 10 mg by mouth daily. pravastatin 20 mg tablet Commonly known as:  PRAVACHOL  
take 1 tablet by mouth NIGHTLY  
  
 topiramate 100 mg tablet Commonly known as:  TOPAMAX  
take 1 tablet by mouth twice a day  
  
 traMADol 50 mg tablet Commonly known as:  ULTRAM  
take 1 tablet by mouth every 6 hours if needed  
  
 traZODone 50 mg tablet Commonly known as:  Nowak Bloodgood Take 0.5 Tabs by mouth nightly. TYLENOL 325 mg tablet Generic drug:  acetaminophen Take  by mouth every four (4) hours as needed for Pain. We Performed the Following [address] [place] [address] MONITORING [[id number] Custom] INFLUENZA VIRUS VAC QUAD,SPLIT,PRESV FREE SYRINGE IM [id number] CPT(R)] AR IMMUNIZ ADMIN,1 SINGLE/COMB VAC/TOXOID [id number] CPT(R)] Follow-up Instructions Return if symptoms worsen or fail to improve. Patient Instructions Keep appointment in January Vaccine Information Statement Influenza (Flu) Vaccine (Inactivated or Recombinant): What you need to know Many Vaccine Information Statements are available in British and other languages. See www.immunize.org/vis Hojas de Información Sobre Vacunas están disponibles en Español y en muchos otros idiomas. Visite www.immunize.org/vis 1. Why get vaccinated? Influenza (flu) is a contagious disease that spreads around the [place] every year, usually between October and May. Flu is caused by influenza viruses, and is spread mainly by coughing, sneezing, and close contact. Anyone can get flu. Flu strikes suddenly and can last several days. Symptoms vary by age, but can include: 
 fever/chills  sore throat  muscle aches  fatigue  cough  headache  runny or stuffy nose Flu can also lead to pneumonia and blood infections, and cause diarrhea and seizures in children. If you have a medical condition, such as heart or lung disease, flu can make it worse. Flu is more dangerous for some people. Infants and young children, people 72years of age and older, pregnant women, and people with certain health conditions or a weakened immune system are at greatest risk. Each year thousands of people in the [facility] die from flu, and many more are hospitalized. Flu vaccine can: 
 keep you from getting flu, 
 make flu less severe if you do get it, and 
 keep you from spreading flu to your family and other people. 2. Inactivated and recombinant flu vaccines A dose of flu vaccine is recommended every flu season. Children 6 months through 6years of age may need two doses during the same flu season. Everyone else needs only one dose each flu season. Some inactivated flu vaccines contain a very small amount of a mercury-based preservative called thimerosal. Studies have not shown thimerosal in vaccines to be harmful, but flu vaccines that do not contain thimerosal are available. There is no live flu virus in flu shots. They cannot cause the flu. There are many flu viruses, and they are always changing.  Each year a new flu vaccine is made to protect against three or four viruses that are likely to cause disease in the upcoming flu season. But even when the vaccine doesnt exactly match these viruses, it may still provide some protection Flu vaccine cannot prevent: 
 flu that is caused by a virus not covered by the vaccine, or 
 illnesses that look like flu but are not. It takes about 2 weeks for protection to develop after vaccination, and protection lasts through the flu season. 3. Some people should not get this vaccine Tell the person who is giving you the vaccine:  If you have any severe, life-threatening allergies. If you ever had a life-threatening allergic reaction after a dose of flu vaccine, or have a severe allergy to any part of this vaccine, you may be advised not to get vaccinated. Most, but not all, types of flu vaccine contain a small amount of egg protein.  If you ever had Guillain-Barré Syndrome (also called GBS). Some people with a history of GBS should not get this vaccine. This should be discussed with your doctor.  If you are not feeling well. It is usually okay to get flu vaccine when you have a mild illness, but you might be asked to come back when you feel better. 4. Risks of a vaccine reaction With any medicine, including vaccines, there is a chance of reactions. These are usually mild and go away on their own, but serious reactions are also possible. Most people who get a flu shot do not have any problems with it. Minor problems following a flu shot include:  
 soreness, redness, or swelling where the shot was given  hoarseness  sore, red or itchy eyes  cough  fever  aches  headache  itching  fatigue If these problems occur, they usually begin soon after the shot and last 1 or 2 days. More serious problems following a flu shot can include the following:  There may be a small increased risk of Guillain-Barré Syndrome (GBS) after inactivated flu vaccine. This risk has been estimated at 1 or 2 additional cases per million people vaccinated. This is much lower than the risk of severe complications from flu, which can be prevented by flu vaccine.  Young children who get the flu shot along with pneumococcal vaccine (PCV13) and/or DTaP vaccine at the same time might be slightly more likely to have a seizure caused by fever. Ask your doctor for more information. Tell your doctor if a child who is getting flu vaccine has ever had a seizure. Problems that could happen after any injected vaccine:  People sometimes faint after a medical procedure, including vaccination. Sitting or lying down for about 15 minutes can help prevent fainting, and injuries caused by a fall. Tell your doctor if you feel dizzy, or have vision changes or ringing in the ears.  Some people get severe pain in the shoulder and have difficulty moving the arm where a shot was given. This happens very rarely.  Any medication can cause a severe allergic reaction. Such reactions from a vaccine are very rare, estimated at about 1 in a million doses, and would happen within a few minutes to a few hours after the vaccination. As with any medicine, there is a very remote chance of a vaccine causing a serious injury or death. The safety of vaccines is always being monitored. For more information, visit: www.cdc.gov/vaccinesafety/ 
 
5. What if there is a serious reaction? What should I look for?  Look for anything that concerns you, such as signs of a severe allergic reaction, very high fever, or unusual behavior. Signs of a severe allergic reaction can include hives, swelling of the face and throat, difficulty breathing, a fast heartbeat, dizziness, and weakness  usually within a few minutes to a few hours after the vaccination. What should I do?  
 
 If you think it is a severe allergic reaction or other emergency that cant wait, call 9-1-1 and get the person to the nearest hospital. Otherwise, call your doctor.  Reactions should be reported to the Vaccine Adverse Event Reporting System (VAERS). Your doctor should file this report, or you can do it yourself through  the VAERS web site at www.vaers. [facility].gov, or by calling 2-374.165.2101. VAERS does not give medical advice. 6. The National Vaccine Injury Compensation Program 
 
The [facility] Vaccine Injury Compensation Program (VICP) is a federal program that was created to compensate people who may have been injured by certain vaccines. Persons who believe they may have been injured by a vaccine can learn about the program and about filing a claim by calling 8-137.729.5546 or visiting the [employer] website at www.[facility].gov/vaccinecompensation. There is a time limit to file a claim for compensation. 7. How can I learn more?  Ask your healthcare provider. He or she can give you the vaccine package insert or suggest other sources of information.  Call your local or state health department.  Contact the Centers for Disease Control and Prevention (CDC): 
- Call 6-186.283.7017 (1-800-CDC-INFO) or 
- Visit CDCs website at www.cdc.gov/flu Vaccine Information Statement Inactivated Influenza Vaccine 8/7/2015 
42 SALVATOREClarke Doe [id number] [facility] and [employer] Centers for Disease Control and Prevention Office Use Only Learning About Prescribed Opioid Medicine for Chronic Pain Introduction Opioids are medicines used to relieve moderate to severe pain. They may be used for pain that may go on for a long time, such as for cancer or arthritis. Opioids relieve pain by changing the way your body feels pain and the way you feel about pain. They don't cure a health problem. But they do help you manage the pain. Your doctor will talk with you about how they fit into your overall treatment plan. Doctors follow a strict set of rules for giving opioids to their patients who have long-term (chronic) pain. This is because these are powerful medicines. They can cause problems if they are not used correctly. They can also be misused. Getting a prescription for an opioid may seem hard to do. You may feel like your doctor doesn't trust you. Your doctor understands this. But the rules are the same for everyone. They help make sure that the medicine will be used safely. What happens before you get a prescription? Your doctor may talk with you about your pain history. He or she may ask about your family history. You may have a physical exam. You may also see a pain specialist. This helps your doctor decide if an opioid is right for you. Your doctor will also review your history of opioid use. Most states have a program that tracks prescriptions. If your state does, your doctor will check it to see if you've had prescriptions for opioids before. You may have a urine test to check for opioids in your system. If you're a woman, you may have a pregnancy test. Opioids are not safe to take if you are pregnant. When your doctor prescribes the medicine, he or she will discuss your treatment plan with you. That includes the risks and benefits of opioid medicines. Your doctor will tell you how much pain relief and improved ability to function you can expect. The goal is to take the lowest dose that improves your pain for the shortest amount of time. Your doctor will also talk with you about other things you can do to help relieve pain. Your doctor may suggest different medicines or other options, such as steroid injections, ice or heat, physical therapy, or ways to reduce stress. You and your doctor will talk about your responsibilities. You'll both sign a written agreement. You will agree to take your medicine exactly as your doctor tells you to. Santy Cortez also agree to be careful with it and not to share it with others. What happens after you start to use your medicine? Regular follow-up visits to your doctor will help you and your doctor make sure that the medicine is the right treatment for your pain. At every visit, your doctor will check these things: · Is your pain being controlled? · Are you better able to function and be active? · Are you having any side effects, like constipation, nausea, or being too sleepy? · Are the non-opioid pain control measures working? Are there other things you can try? · Are there any signs that you are misusing your medicine? While you are taking an opioid, you may have drug tests and prescription history checks from time to time. Follow-up care is a key part of your treatment and safety. Be sure to make and go to all appointments, and call your doctor if you are having problems. It's also a good idea to know your test results and keep a list of the medicines you take. Where can you learn more? Go to http://chandrakant-charlotte.info/. Enter D374 in the search box to learn more about \"Learning About Prescribed Opioid Medicine for Chronic Pain. \" Current as of: November 15, 2016 Content Version: 11.3 © 4183-9272 [employer]. Care instructions adapted under license by [employer] (which disclaims liability or warranty for this information). If you have questions about a medical condition or this instruction, always ask your healthcare professional. [address] any warranty or liability for your use of this information. Introducing [facility]! Dear Ramona Mcfarlane: Thank you for requesting a Coden[employer] account. Our records indicate that you already have an active Codenvy account. You can access your account anytime at [email or web address] Did you know that you can access your hospital and ER discharge instructions at any time in Codenvy?   You can also review all of your test results from your hospital stay or ER visit. Additional Information If you have questions, please visit the Frequently Asked Questions section of the [employer] website at [email or web address] Remember, [employer] is NOT to be used for urgent needs. For medical emergencies, dial 911. Now available from your iPhone and Android! Please provide this summary of care documentation to your next provider. Your primary care clinician is listed as Madelaine Chappell. If you have any questions after today's visit, please call [phone].

## 2017-09-12 NOTE — PROGRESS NOTES
HISTORY OF PRESENT ILLNESS  Florie Krabbe is a 59 y.o. female. HPI  She presents for follow up of chronic bilateral knee pain  of 3  years duration. X-ray of right knee in March 2014 showed: \"Moderate medial and patellofemoral compartment osteoarthritis with a possible small loose body in the intercondylar notch. \"  Pain quality is described as aching and sharp   Severity at worst is  10/10 and at least is 3/10. Pain is present every day. Pain is aggravated by walking, climbing steps   Pain affects work, recreational activities and [employer]. Pain is alleviated by heat, ice, medication: Tramadol used and beneficial, cortisone injection, Synvisc injection. Has put in request with orthopedic for another injection. Told not bad enough for TKR  using narcotic analgesics regularly daily in stable pattern with improved pain control and improved quality of life. Still struggles with activity when near time for next shot.  reviewed and shows patient is using same pharmacy and not getting controlled substances from other sources. Significant changes since last visit: none. She is able to do some  her normal daily activities. She reports the following adverse side effects: none  Opioid Risk Tool Reviewed: YES  Opioid Risk Tool   This tool should be administered to patients upon an initial visit prior to beginning opioid therapy for pain management. A score of 3 or lower indicates low risk for future opioid abuse, a score of 4 to 7 indicates moderate risk for opioid abuse, and a score of 8 or higher indicates a high risk for opioid abuse.  Alexandr each box that applies  Female  Male    Family history of substance abuse    Alcohol  1  3    Illegal drugs  2  3    Rx drugs  4  4    Personal history of substance abuse    Alcohol  3  3    Illegal drugs  4  4    Rx drugs  5  5    Age between 16--45 years  1  1    History of preadolescent sexual abuse  3  0    Psychological disease    ADD, OCD, bipolar, schizophrenia  2  2    Depression  1  1    Scoring totals        0       Aberrant behaviors: None. Urine Drug Screen: due - order placed. Controlled substance agreement on file: YES. Signed today   reviewed:yes  Pill count is consistent with her prescription: is completely out of medication  Concomitant use of a benzodiazepine: no    Patient Active Problem List   Diagnosis Code    Hypertension, essential, benign I10    Environmental allergies Z91.09    Hypercholesterolemia E78.00    Anxiety F41.9    Morbid obesity with BMI of 45.0-49.9, adult ([facility] E66.01, Z68.42    Primary osteoarthritis of both knees M17.0    Long term (current) use of opiate analgesic Z79.891     Past Medical History:   Diagnosis Date    Arthritis     Knees    Environmental allergies     Hives     HTN (hypertension) 5/20/2013     Past Surgical History:   Procedure Laterality Date    HX GYN      3 pregnancies, 3 children     Social History     Social History    Marital status:      Spouse name: N/A    Number of children: N/A    Years of education: N/A     Social History Main Topics    Smoking status: Never Smoker    Smokeless tobacco: Never Used    Alcohol use Yes      Comment: rare    Drug use: No    Sexual activity: Yes     Birth control/ protection: None     Other Topics Concern    None     Social History Narrative     Family History   Problem Relation Age of Onset    Hypertension Mother     Diabetes Mother     Cancer Father      lung    Diabetes Sister     Hypertension Sister      No Known Allergies  Current Outpatient Prescriptions   Medication Sig Dispense Refill    traZODone (DESYREL) 50 mg tablet Take 0.5 Tabs by mouth nightly.  30 Tab 1    traMADol (ULTRAM) 50 mg tablet take 1 tablet by mouth every 6 hours if needed 60 Tab 0    pravastatin (PRAVACHOL) 20 mg tablet take 1 tablet by mouth NIGHTLY 30 Tab 11    acetaminophen (TYLENOL) 325 mg tablet Take  by mouth every four (4) hours as needed for Pain.      topiramate (TOPAMAX) 100 mg tablet take 1 tablet by mouth twice a day 60 Tab 5    DULoxetine (CYMBALTA) 60 mg capsule take 1 capsule by mouth once daily 30 Cap 11    lisinopril-hydroCHLOROthiazide (PRINZIDE, ZESTORETIC) 20-12.5 mg per tablet take 1 tablet by mouth once daily 30 Tab 11    loratadine (CLARITIN) 10 mg tablet Take 10 mg by mouth daily.  fluticasone (FLONASE) 50 mcg/actuation nasal spray 2 Sprays by Both Nostrils route two (2) times a day. 2 Bottle prn         ROS  Visit Vitals    /78 (BP 1 Location: Left arm, BP Patient Position: Sitting)    Pulse 68    Temp 98.2 °F (36.8 °C) (Oral)    Resp 16    Ht 5' 4\" (1.626 m)    Wt 270 lb (122.5 kg)    SpO2 98%    BMI 46.35 kg/m2     Physical Exam   Constitutional: She is oriented to person, place, and time. She appears well-developed and well-nourished. HENT:   Head: Normocephalic and atraumatic. Eyes: Pupils are equal, round, and reactive to light. Neck: Neck supple. Cardiovascular: Normal rate, regular rhythm and normal heart sounds. Exam reveals no gallop. No murmur heard. Pulmonary/Chest: Effort normal and breath sounds normal. She has no wheezes. She has no rales. Musculoskeletal: She exhibits no edema. Right knee: She exhibits normal range of motion, no laceration, normal alignment, no LCL laxity and no MCL laxity. Tenderness found. Medial joint line and lateral joint line tenderness noted. Left knee: She exhibits normal range of motion, normal alignment, no LCL laxity and no MCL laxity. Tenderness found. Medial joint line tenderness noted. Neurological: She is alert and oriented to person, place, and time. Skin: Skin is warm, dry and intact. No rash noted. Nursing note and vitals reviewed. ASSESSMENT and PLAN    ICD-10-CM ICD-9-CM    1. Encounter for chronic pain management G89.29 V65.49    2.  Long term (current) use of opiate analgesic Z79.891 V58.69 COMPLIANCE DRUG SCREEN/PRESCRIPTION MONITORING   3. Primary osteoarthritis of both knees M17.0 715.16    4. Primary insomnia F51.01 307.42 traZODone (DESYREL) 50 mg tablet   5. Encounter for immunization Z23 V03.89 INFLUENZA VIRUS VAC QUAD,SPLIT,PRESV FREE SYRINGE IM      MD IMMUNIZ ADMIN,1 SINGLE/COMB VAC/TOXOID     Diagnoses and all orders for this visit:    1. Encounter for chronic pain management    2. Long term (current) use of opiate analgesic  -     COMPLIANCE DRUG SCREEN/PRESCRIPTION MONITORING    3. Primary osteoarthritis of both knees    4. Primary insomnia  -     traZODone (DESYREL) 50 mg tablet; Take 0.5 Tabs by mouth nightly. 5. Encounter for immunization  -     Influenza virus vaccine (QUADRIVALENT PRES FREE SYRINGE) IM (54075)  -     MD IMMUNIZ ADMIN,1 SINGLE/COMB VAC/TOXOID      Follow-up Disposition:  Return if symptoms worsen or fail to improve. Keep appointment 1/8/2018  I have discussed the diagnosis with the patient and the intended plan as seen in the above orders. Patient is in agreement. The patient has received an after-visit summary and questions were answered concerning future plans. I have discussed medication side effects and warnings with the patient as well.

## 2017-09-17 PROBLEM — G89.29 ENCOUNTER FOR CHRONIC PAIN MANAGEMENT: Status: ACTIVE | Noted: 2017-09-17

## 2017-09-18 LAB — DRUGS UR: NORMAL

## 2017-09-21 ENCOUNTER — APPOINTMENT (OUTPATIENT)
Dept: GENERAL RADIOLOGY | Age: 57
End: 2017-09-21
Attending: EMERGENCY MEDICINE
Payer: COMMERCIAL

## 2017-09-21 ENCOUNTER — APPOINTMENT (OUTPATIENT)
Dept: CT IMAGING | Age: 57
End: 2017-09-21
Attending: EMERGENCY MEDICINE
Payer: COMMERCIAL

## 2017-09-21 ENCOUNTER — HOSPITAL ENCOUNTER (EMERGENCY)
Age: 57
Discharge: HOME | End: 2017-09-21
Attending: EMERGENCY MEDICINE
Payer: COMMERCIAL

## 2017-09-21 VITALS
SYSTOLIC BLOOD PRESSURE: 139 MMHG | HEIGHT: 64 IN | BODY MASS INDEX: 42.68 KG/M2 | DIASTOLIC BLOOD PRESSURE: 50 MMHG | WEIGHT: 250 LBS | OXYGEN SATURATION: 99 %

## 2017-09-21 DIAGNOSIS — S80.00XA CONTUSION OF KNEE, UNSPECIFIED LATERALITY, INITIAL ENCOUNTER: ICD-10-CM
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
## 2017-09-21 DIAGNOSIS — S16.1XXA CERVICAL STRAIN, INITIAL ENCOUNTER: ICD-10-CM

## 2017-09-21 PROCEDURE — 73562 X-RAY EXAM OF KNEE 3: CPT

## 2017-09-21 PROCEDURE — 72125 CT NECK SPINE W/O DYE: CPT

## 2017-09-21 PROCEDURE — 70450 CT HEAD/BRAIN W/O DYE: CPT

## 2017-09-21 PROCEDURE — 72100 X-RAY EXAM L-S SPINE 2/3 VWS: CPT

## 2017-09-21 PROCEDURE — 73090 X-RAY EXAM OF FOREARM: CPT

## 2017-09-21 PROCEDURE — 99284 EMERGENCY DEPT VISIT MOD MDM: CPT

## 2017-09-21 PROCEDURE — 74011250637 HC RX REV CODE- 250/637: Performed by: EMERGENCY MEDICINE

## 2017-09-21 RX ORDER — HYDROCODONE BITARTRATE AND ACETAMINOPHEN 5; 325 MG/1; MG/1
2 TABLET ORAL
Status: COMPLETED | OUTPATIENT
Start: 2017-09-21 | End: 2017-09-21

## 2017-09-21 RX ORDER — ONDANSETRON 4 MG/1
4 TABLET, ORALLY DISINTEGRATING ORAL
Status: COMPLETED | OUTPATIENT
Start: 2017-09-21 | End: 2017-09-21

## 2017-09-21 RX ORDER — HYDROCODONE BITARTRATE AND ACETAMINOPHEN 5; 325 MG/1; MG/1
1 TABLET ORAL
Qty: 20 TAB | Refills: 0 | Status: SHIPPED | OUTPATIENT
Start: 2017-09-21 | End: 2017-12-19

## 2017-09-21 RX ADMIN — ONDANSETRON 4 MG: 4 TABLET, ORALLY DISINTEGRATING ORAL at 16:44

## 2017-09-21 RX ADMIN — HYDROCODONE BITARTRATE AND ACETAMINOPHEN 2 TABLET: 5; 325 TABLET ORAL at 16:44
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
## 2017-09-21 NOTE — ED NOTES
Patient given discharge information. All questions and concerns answered. No additional needs at this time. Patient ambulatory out with family.
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
## 2017-10-16 DIAGNOSIS — M17.0 PRIMARY OSTEOARTHRITIS OF BOTH KNEES: ICD-10-CM

## 2017-10-16 RX ORDER — TRAMADOL HYDROCHLORIDE 50 MG/1
TABLET ORAL
Qty: 60 TAB | Refills: 0 | Status: CANCELLED | OUTPATIENT
Start: 2017-10-16

## 2017-10-16 NOTE — TELEPHONE ENCOUNTER
From: Krzysztof Moran  To: Yareli Light MD  Sent: 10/16/2017 12:21 PM EDT  Subject: Medication Renewal Request    Original authorizing provider: MD Krzysztof Reed would like a refill of the following medications:  traMADol (ULTRAM) 50 mg tablet Yareli Light MD]    Preferred pharmacy: [facility] [id number] [place] [address]: It is my understanding that I have to come to the dtr office to  this prescription? ? Due to its NEW category drugs.  Please advise as I am completely out and have not been able to get my shots as of yet for my knees so I am taking 2x day

## 2017-10-16 NOTE — TELEPHONE ENCOUNTER
Inform patient she is required to notify us within 3 days if she receives emergency treatment that includes a controlled substance prescription. She did not do this and this constitutes a breach of controlled substance agreement. Thus I will not be able to continue prescribing tramadol for her. Tell her to refer to contract if she needs further clarification.

## 2017-10-17 NOTE — TELEPHONE ENCOUNTER
Pt notified of Dr Shannon Shape note, pt said she thought that if she went to a [facility] location they would let us know of any RX's. Advised that the patient needs to let us know.

## 2017-11-22 RX ORDER — TOPIRAMATE 100 MG/1
TABLET, FILM COATED ORAL
Qty: 60 TAB | Refills: 5 | Status: SHIPPED | OUTPATIENT
Start: 2017-11-22 | End: 2018-05-20 | Stop reason: SDUPTHER

## 2017-12-19 ENCOUNTER — OFFICE VISIT (OUTPATIENT)
Dept: INTERNAL MEDICINE CLINIC | Age: 57
End: 2017-12-19

## 2017-12-19 VITALS
BODY MASS INDEX: 46.69 KG/M2 | DIASTOLIC BLOOD PRESSURE: 73 MMHG | HEART RATE: 73 BPM | TEMPERATURE: 97.4 F | SYSTOLIC BLOOD PRESSURE: 139 MMHG | OXYGEN SATURATION: 99 % | RESPIRATION RATE: 20 BRPM | HEIGHT: 64 IN | WEIGHT: 273.5 LBS

## 2017-12-19 DIAGNOSIS — Z01.818 PREOP GENERAL PHYSICAL EXAM: Primary | ICD-10-CM

## 2017-12-19 DIAGNOSIS — H26.9 CATARACT OF BOTH EYES, UNSPECIFIED CATARACT TYPE: ICD-10-CM

## 2017-12-19 DIAGNOSIS — F51.01 PRIMARY INSOMNIA: ICD-10-CM

## 2017-12-19 DIAGNOSIS — I10 HYPERTENSION, ESSENTIAL, BENIGN: ICD-10-CM

## 2017-12-19 DIAGNOSIS — E66.01 MORBID OBESITY WITH BMI OF 45.0-49.9, ADULT (HCC): ICD-10-CM

## 2017-12-19 DIAGNOSIS — E78.00 HYPERCHOLESTEROLEMIA: ICD-10-CM

## 2017-12-19 RX ORDER — TRAZODONE HYDROCHLORIDE 50 MG/1
25 TABLET ORAL
Qty: 30 TAB | Refills: 5 | Status: SHIPPED | OUTPATIENT
Start: 2017-12-19 | End: 2018-06-25 | Stop reason: SDUPTHER

## 2017-12-19 RX ORDER — CHOLECALCIFEROL (VITAMIN D3) 125 MCG
CAPSULE ORAL
COMMUNITY
End: 2021-05-14

## 2017-12-19 NOTE — MR AVS SNAPSHOT
Visit Information Date & Time Provider Department Dept. Phone Encounter #  
 12/19/2017  3:00 PM Cristal Bello [phone] [id number] Follow-up Instructions Return in about 6 months (around 6/19/2018) for HTN, chol, wt Fasting lab one week prior. Cancel appt 1/8/18. Your Appointments 1/8/2018  3:30 PM  
ROUTINE CARE with MD Cristal Bello [address] [facility]) Appt Note: 6mth f/u;  HTN, chol, anx  
 [address] [facility] [address] [phone]  
  
   
 [address] [facility] [address] Upcoming Health Maintenance Date Due COLONOSCOPY 3/22/1976 PAP AKA CERVICAL CYTOLOGY 3/19/2017 DTaP/Tdap/Td series (2 - Td) 11/10/2024 Allergies as of 12/19/2017  Review Complete On: 12/19/2017 By: Shayy Wilkins MD  
 No Known Allergies Current Immunizations  Reviewed on 12/19/2017 Name Date Influenza Vaccine 8/26/2013, 12/1/2012 Influenza Vaccine (Quad) PF 9/12/2017, 11/15/2016, 11/10/2014 Tdap 11/10/2014 Reviewed by Ana Paula Aquino LPN on 24/00/0407 at  3:45 PM  
You Were Diagnosed With   
  
 Codes Comments Preop general physical exam    -  Primary ICD-10-CM: D97.477 ICD-9-CM: V72.83 Cataract of both eyes, unspecified cataract type     ICD-10-CM: H26.9 ICD-9-CM: 366.9 Primary insomnia     ICD-10-CM: F51.01 
ICD-9-CM: 307.42 Hypertension, essential, benign     ICD-10-CM: I10 
ICD-9-CM: 401.1 Hypercholesterolemia     ICD-10-CM: E78.00 ICD-9-CM: 272.0 Morbid obesity with BMI of 45.0-49.9, adult (HCC)     ICD-10-CM: E66.01, Z68.42 
ICD-9-CM: 278.01, V85.42 Vitals BP Pulse Temp Resp Height(growth percentile) Weight(growth percentile) 139/73 (BP 1 Location: Left arm, BP Patient Position: Sitting) 73 97.4 °F (36.3 °C) (Oral) 20 5' 4\" (1.626 m) 273 lb 8 oz (124.1 kg) SpO2 BMI OB Status Smoking Status 99% 46.95 kg/m2 Ablation Never Smoker BMI and BSA Data Body Mass Index Body Surface Area 46.95 kg/m 2 2.37 m 2 Preferred Pharmacy Pharmacy Name Phone [id number] [address] [place] [address] [phone] Your Updated Medication List  
  
   
This list is accurate as of: 12/19/17  4:20 PM.  Always use your most recent med list.  
  
  
  
  
 ALEVE 220 mg Cap Generic drug:  naproxen sodium Take  by mouth. DULoxetine 60 mg capsule Commonly known as:  CYMBALTA  
take 1 capsule by mouth once daily  
  
 fluticasone 50 mcg/actuation nasal spray Commonly known as:  [place] 2 Sprays by Both Nostrils route two (2) times a day. lisinopril-hydroCHLOROthiazide 20-12.5 mg per tablet Commonly known as:  PRINZIDE, ZESTORETIC  
take 1 tablet by mouth once daily  
  
 pravastatin 20 mg tablet Commonly known as:  PRAVACHOL  
take 1 tablet by mouth NIGHTLY  
  
 topiramate 100 mg tablet Commonly known as:  TOPAMAX  
take 1 tablet by mouth twice a day  
  
 traZODone 50 mg tablet Commonly known as:  Mikael Lipps Take 0.5 Tabs by mouth nightly. Prescriptions Sent to Pharmacy Refills  
 traZODone (DESYREL) 50 mg tablet 5 Sig: Take 0.5 Tabs by mouth nightly. Class: Normal  
 Pharmacy: [id number] [address] [place] [address] Ph #: [phone] Route: Oral  
  
Follow-up Instructions Return in about 6 months (around 6/19/2018) for HTN, chol, wt Fasting lab one week prior. Cancel appt 1/8/18. To-Do List   
 06/19/2018 Lab:  LIPID PANEL   
  
 06/19/2018 Lab:  METABOLIC PANEL, COMPREHENSIVE Patient Instructions Body Mass Index: Care Instructions Your Care Instructions Body mass index (BMI) can help you see if your weight is raising your risk for health problems. It uses a formula to compare how much you weigh with how tall you are. · A BMI lower than 18.5 is considered underweight. · A BMI between 18.5 and 24.9 is considered healthy. · A BMI between 25 and 29.9 is considered overweight. A BMI of 30 or higher is considered obese. If your BMI is in the normal range, it means that you have a lower risk for weight-related health problems. If your BMI is in the overweight or obese range, you may be at increased risk for weight-related health problems, such as high blood pressure, heart disease, stroke, arthritis or joint pain, and diabetes. If your BMI is in the underweight range, you may be at increased risk for health problems such as fatigue, lower protection (immunity) against illness, muscle loss, bone loss, hair loss, and hormone problems. BMI is just one measure of your risk for weight-related health problems. You may be at higher risk for health problems if you are not active, you eat an unhealthy diet, or you drink too much alcohol or use tobacco products. Follow-up care is a key part of your treatment and safety. Be sure to make and go to all appointments, and call your doctor if you are having problems. It's also a good idea to know your test results and keep a list of the medicines you take. How can you care for yourself at home? · Practice healthy eating habits. This includes eating plenty of fruits, vegetables, whole grains, lean protein, and low-fat dairy. · If your doctor recommends it, get more exercise. Walking is a good choice. Bit by bit, increase the amount you walk every day. Try for at least 30 minutes on most days of the week. · Do not smoke. Smoking can increase your risk for health problems. If you need help quitting, talk to your doctor about stop-smoking programs and medicines. These can increase your chances of quitting for good. · Limit alcohol to 2 drinks a day for men and 1 drink a day for women.  Too much alcohol can cause health problems. If you have a BMI higher than 25 · Your doctor may do other tests to check your risk for weight-related health problems. This may include measuring the distance around your waist. A waist measurement of more than 40 inches in men or 35 inches in women can increase the risk of weight-related health problems. · Talk with your doctor about steps you can take to stay healthy or improve your health. You may need to make lifestyle changes to lose weight and stay healthy, such as changing your diet and getting regular exercise. If you have a BMI lower than 18.5 · Your doctor may do other tests to check your risk for health problems. · Talk with your doctor about steps you can take to stay healthy or improve your health. You may need to make lifestyle changes to gain or maintain weight and stay healthy, such as getting more healthy foods in your diet and doing exercises to build muscle. Where can you learn more? Go to http://chandrakant-charlotte.info/. Enter S176 in the search box to learn more about \"Body Mass Index: Care Instructions. \" Current as of: October 13, 2016 Content Version: 11.4 © 2706-3588 [employer]. Care instructions adapted under license by [employer] (which disclaims liability or warranty for this information). If you have questions about a medical condition or this instruction, always ask your healthcare professional. [address] any warranty or liability for your use of this information. Introducing [facility]! Dear Carolyn Weiss: Thank you for requesting a [employer] account. Our records indicate that you already have an active [employer] account. You can access your account anytime at [email or web address] Did you know that you can access your hospital and ER discharge instructions at any time in [employer]?   You can also review all of your test results from your hospital stay or ER visit. Additional Information If you have questions, please visit the Frequently Asked Questions section of the [employer] website at [email or web address] Remember, [employer] is NOT to be used for urgent needs. For medical emergencies, dial 911. Now available from your iPhone and Android! Please provide this summary of care documentation to your next provider. Your primary care clinician is listed as Stephanie Beverly. If you have any questions after today's visit, please call [phone].

## 2017-12-19 NOTE — PROGRESS NOTES
Mitzy Magdaleno    Chief Complaint   Patient presents with    Pre-op Exam     cataract       Reviewed record In preparation for visit and have obtained necessary documentation. Has info on advanced directive but has not filled them out. 1. Have you been to the ER, urgent care clinic or hospitalized since your last visit? [id number]    2. Have you seen or consulted any other health care providers outside of the [address] [place] [address] since your last visit? Include any pap smears or colon screening. Orthopedics, PT    Vitals reviewed with provider.     Health Maintenance reviewed:

## 2017-12-19 NOTE — PROGRESS NOTES
HISTORY OF PRESENT ILLNESS  Yanni Aragon is a 59 y.o. female. HPI  Presents at request of Dr Joselo Guevara for evaluation of medical problems prior to bilateral cataract surgery planned for 12/27/17 and 1/16/18. She presents for follow up of hypertension, hyperlipidemia and obesity. She had gained 3 lbs since September.    Diet and Lifestyle: generally follows a low fat low cholesterol diet, generally follows a low sodium diet, exercises sporadically, nonsmoker  Medication compliance: compliant all of the time  Medication side effects: none  Home BP Monitoring: not done  Cardiovascular ROS:  She denies palpitations, orthopnea, exertional chest pressure/discomfort, claudication, lower extremity edema, dyspnea on exertion, dizziness     Patient Active Problem List   Diagnosis Code    Hypertension, essential, benign I10    Environmental allergies Z91.09    Hypercholesterolemia E78.00    Anxiety F41.9    Morbid obesity with BMI of 45.0-49.9, adult (HCC) E66.01, Z68.42    Primary osteoarthritis of both knees M17.0    Long term (current) use of opiate analgesic Z79.891    Encounter for chronic pain management G89.29     Past Medical History:   Diagnosis Date    Arthritis     Knees    Environmental allergies     Hives     HTN (hypertension) 5/20/2013     Past Surgical History:   Procedure Laterality Date    HX GYN      3 pregnancies, 3 children     Social History     Social History    Marital status:      Spouse name: N/A    Number of children: N/A    Years of education: N/A     Social History Main Topics    Smoking status: Never Smoker    Smokeless tobacco: Never Used    Alcohol use Yes      Comment: rare    Drug use: No    Sexual activity: Yes     Birth control/ protection: None     Other Topics Concern    None     Social History Narrative     Family History   Problem Relation Age of Onset    Hypertension Mother     Diabetes Mother     Cancer Father      lung    Diabetes Sister  Hypertension Sister      No Known Allergies  Current Outpatient Prescriptions   Medication Sig Dispense Refill    naproxen sodium (ALEVE) 220 mg cap Take  by mouth.  topiramate (TOPAMAX) 100 mg tablet take 1 tablet by mouth twice a day 60 Tab 5    traZODone (DESYREL) 50 mg tablet Take 0.5 Tabs by mouth nightly. 30 Tab 1    pravastatin (PRAVACHOL) 20 mg tablet take 1 tablet by mouth NIGHTLY 30 Tab 11    DULoxetine (CYMBALTA) 60 mg capsule take 1 capsule by mouth once daily 30 Cap 11    lisinopril-hydroCHLOROthiazide (PRINZIDE, ZESTORETIC) 20-12.5 mg per tablet take 1 tablet by mouth once daily 30 Tab 11    fluticasone (FLONASE) 50 mcg/actuation nasal spray 2 Sprays by Both Nostrils route two (2) times a day. 2 Bottle prn         Review of Systems   Constitutional: Negative for malaise/fatigue and weight loss. HENT: Negative for congestion. Chronic post nasal drainage   Respiratory: Positive for cough (due to post nasal drainage). Negative for shortness of breath. Cardiovascular: Negative for chest pain, palpitations, orthopnea, claudication and leg swelling. Gastrointestinal: Negative for constipation, diarrhea and heartburn. Musculoskeletal: Positive for back pain and joint pain. Neurological: Negative for dizziness, tingling and focal weakness. Visit Vitals    /73 (BP 1 Location: Left arm, BP Patient Position: Sitting)    Pulse 73    Temp 97.4 °F (36.3 °C) (Oral)    Resp 20    Ht 5' 4\" (1.626 m)    Wt 273 lb 8 oz (124.1 kg)    SpO2 99%    BMI 46.95 kg/m2     Physical Exam   Constitutional: She is oriented to person, place, and time. She appears well-developed and well-nourished. HENT:   Head: Normocephalic and atraumatic. Eyes: Conjunctivae are normal. Pupils are equal, round, and reactive to light. Neck: Neck supple. Carotid bruit is not present. No thyromegaly present. Cardiovascular: Normal rate, regular rhythm and normal heart sounds.   PMI is not displaced. Exam reveals no gallop. No murmur heard. Pulses:       Dorsalis pedis pulses are 2+ on the right side, and 2+ on the left side. Posterior tibial pulses are 2+ on the right side, and 2+ on the left side. Pulmonary/Chest: Effort normal. She has no wheezes. She has no rhonchi. She has no rales. Abdominal: Soft. Normal appearance. She exhibits no abdominal bruit and no mass. There is no hepatosplenomegaly. There is no tenderness. Musculoskeletal: She exhibits no edema. Lymphadenopathy:     She has no cervical adenopathy. Right: No supraclavicular adenopathy present. Left: No supraclavicular adenopathy present. Neurological: She is alert and oriented to person, place, and time. No sensory deficit. Skin: Skin is warm, dry and intact. No rash noted. Psychiatric: She has a normal mood and affect. Her behavior is normal.   Nursing note and vitals reviewed. Lab Results   Component Value Date/Time    Cholesterol, total 164 06/26/2017 08:41 AM    HDL Cholesterol 61 06/26/2017 08:41 AM    LDL, calculated 82 06/26/2017 08:41 AM    VLDL, calculated 21 06/26/2017 08:41 AM    Triglyceride 107 06/26/2017 08:41 AM     Lab Results   Component Value Date/Time    Sodium 144 06/26/2017 08:41 AM    Potassium 4.1 06/26/2017 08:41 AM    Chloride 102 06/26/2017 08:41 AM    CO2 26 06/26/2017 08:41 AM    Glucose 103 06/26/2017 08:41 AM    BUN 27 06/26/2017 08:41 AM    Creatinine 0.92 06/26/2017 08:41 AM    BUN/Creatinine ratio 29 06/26/2017 08:41 AM    GFR est AA 79 06/26/2017 08:41 AM    GFR est non-AA 68 06/26/2017 08:41 AM    Calcium 9.4 06/26/2017 08:41 AM    Bilirubin, total 0.2 06/26/2017 08:41 AM    AST (SGOT) 12 06/26/2017 08:41 AM    Alk. phosphatase 85 06/26/2017 08:41 AM    Protein, total 6.5 06/26/2017 08:41 AM    Albumin 4.2 06/26/2017 08:41 AM    A-G Ratio 1.8 06/26/2017 08:41 AM    ALT (SGPT) 13 06/26/2017 08:41 AM         ASSESSMENT and PLAN    ICD-10-CM ICD-9-CM    1.  Preop general physical exam Z01.818 V72.83    2. Cataract of both eyes, unspecified cataract type H26.9 366.9 traZODone (DESYREL) 50 mg tablet   3. Hypertension, essential, benign I10 401.1    4. Hypercholesterolemia E78.00 272.0 LIPID PANEL      METABOLIC PANEL, COMPREHENSIVE   5. Primary insomnia F51.01 307.42 traZODone (DESYREL) 50 mg tablet   6. Morbid obesity with BMI of 45.0-49.9, adult ([facility] E66.01 278.01     Z68.42 V85.42      Diagnoses and all orders for this visit:    1. Preop general physical exam  Medical issues optimized for planned surgery    2. Cataract of both eyes, unspecified cataract type    3. Hypertension, essential, benign  Hypertension is controlled    4. Hypercholesterolemia  Hyperlipidemia is controlled         -     LIPID PANEL; Future  -     METABOLIC PANEL, COMPREHENSIVE; Future    5. Primary insomnia  -     traZODone (DESYREL) 50 mg tablet; Take 0.5 Tabs by mouth nightly. 6. Morbid obesity with BMI of 45.0-49.9, adult ([facility]      Follow-up Disposition:  Return in about 6 months (around 6/19/2018) for HTN, chol, wt Fasting lab one week prior. Cancel appt 1/8/18.  lab results and schedule of future lab studies reviewed with patient  reviewed diet, exercise and weight control  cardiovascular risk and specific lipid/LDL goals reviewed  Discussed the patient's BMI with her. The BMI follow up plan is as follows:   dietary management education, guidance, and counseling  encourage exercise  monitor weight  prescribed dietary intake  I have discussed the diagnosis with the patient and the intended plan as seen in the above orders. Patient is in agreement. The patient has received an after-visit summary and questions were answered concerning future plans. I have discussed medication side effects and warnings with the patient as well.

## 2017-12-19 NOTE — PATIENT INSTRUCTIONS
Body Mass Index: Care Instructions  Your Care Instructions    Body mass index (BMI) can help you see if your weight is raising your risk for health problems. It uses a formula to compare how much you weigh with how tall you are. · A BMI lower than 18.5 is considered underweight. · A BMI between 18.5 and 24.9 is considered healthy. · A BMI between 25 and 29.9 is considered overweight. A BMI of 30 or higher is considered obese. If your BMI is in the normal range, it means that you have a lower risk for weight-related health problems. If your BMI is in the overweight or obese range, you may be at increased risk for weight-related health problems, such as high blood pressure, heart disease, stroke, arthritis or joint pain, and diabetes. If your BMI is in the underweight range, you may be at increased risk for health problems such as fatigue, lower protection (immunity) against illness, muscle loss, bone loss, hair loss, and hormone problems. BMI is just one measure of your risk for weight-related health problems. You may be at higher risk for health problems if you are not active, you eat an unhealthy diet, or you drink too much alcohol or use tobacco products. Follow-up care is a key part of your treatment and safety. Be sure to make and go to all appointments, and call your doctor if you are having problems. It's also a good idea to know your test results and keep a list of the medicines you take. How can you care for yourself at home? · Practice healthy eating habits. This includes eating plenty of fruits, vegetables, whole grains, lean protein, and low-fat dairy. · If your doctor recommends it, get more exercise. Walking is a good choice. Bit by bit, increase the amount you walk every day. Try for at least 30 minutes on most days of the week. · Do not smoke. Smoking can increase your risk for health problems. If you need help quitting, talk to your doctor about stop-smoking programs and medicines. These can increase your chances of quitting for good. · Limit alcohol to 2 drinks a day for men and 1 drink a day for women. Too much alcohol can cause health problems. If you have a BMI higher than 25  · Your doctor may do other tests to check your risk for weight-related health problems. This may include measuring the distance around your waist. A waist measurement of more than 40 inches in men or 35 inches in women can increase the risk of weight-related health problems. · Talk with your doctor about steps you can take to stay healthy or improve your health. You may need to make lifestyle changes to lose weight and stay healthy, such as changing your diet and getting regular exercise. If you have a BMI lower than 18.5  · Your doctor may do other tests to check your risk for health problems. · Talk with your doctor about steps you can take to stay healthy or improve your health. You may need to make lifestyle changes to gain or maintain weight and stay healthy, such as getting more healthy foods in your diet and doing exercises to build muscle. Where can you learn more? Go to http://chandrakant-charlotte.info/. Enter S176 in the search box to learn more about \"Body Mass Index: Care Instructions. \"  Current as of: October 13, 2016  Content Version: 11.4  © 1820-9514 [employer]. Care instructions adapted under license by "[employer]" (which disclaims liability or warranty for this information). If you have questions about a medical condition or this instruction, always ask your healthcare professional. [address] any warranty or liability for your use of this information.

## 2018-03-23 RX ORDER — LISINOPRIL AND HYDROCHLOROTHIAZIDE 12.5; 2 MG/1; MG/1
TABLET ORAL
Qty: 30 TAB | Refills: 11 | Status: SHIPPED | OUTPATIENT
Start: 2018-03-23 | End: 2019-03-23 | Stop reason: SDUPTHER

## 2018-03-23 RX ORDER — DULOXETIN HYDROCHLORIDE 60 MG/1
CAPSULE, DELAYED RELEASE ORAL
Qty: 30 CAP | Refills: 11 | Status: SHIPPED | OUTPATIENT
Start: 2018-03-23 | End: 2019-03-22 | Stop reason: SDUPTHER

## 2018-05-20 RX ORDER — TOPIRAMATE 100 MG/1
TABLET, FILM COATED ORAL
Qty: 60 TAB | Refills: 5 | Status: SHIPPED | OUTPATIENT
Start: 2018-05-20 | End: 2018-12-13 | Stop reason: SDUPTHER

## 2018-06-18 DIAGNOSIS — E78.00 HYPERCHOLESTEROLEMIA: ICD-10-CM

## 2018-06-19 LAB
ALBUMIN SERPL-MCNC: 4 G/DL (ref 3.6–4.8)
ALBUMIN/GLOB SERPL: 1.7 {RATIO} (ref 1.2–2.2)
ALP SERPL-CCNC: 80 IU/L (ref 39–117)
ALT SERPL-CCNC: 11 IU/L (ref 0–32)
AST SERPL-CCNC: 14 IU/L (ref 0–40)
BILIRUB SERPL-MCNC: <0.2 MG/DL (ref 0–1.2)
BUN SERPL-MCNC: 21 MG/DL (ref 8–27)
BUN/CREAT SERPL: 27 (ref 12–28)
CALCIUM SERPL-MCNC: 9 MG/DL (ref 8.7–10.3)
CHLORIDE SERPL-SCNC: 102 MMOL/L (ref 96–106)
CHOLEST SERPL-MCNC: 163 MG/DL (ref 100–199)
CO2 SERPL-SCNC: 25 MMOL/L (ref 20–29)
CREAT SERPL-MCNC: 0.79 MG/DL (ref 0.57–1)
GFR SERPLBLD CREATININE-BSD FMLA CKD-EPI: 82 ML/MIN/1.73
GFR SERPLBLD CREATININE-BSD FMLA CKD-EPI: 94 ML/MIN/1.73
GLOBULIN SER CALC-MCNC: 2.4 G/DL (ref 1.5–4.5)
GLUCOSE SERPL-MCNC: 88 MG/DL (ref 65–99)
HDLC SERPL-MCNC: 59 MG/DL
INTERPRETATION, 910389: NORMAL
LDLC SERPL CALC-MCNC: 86 MG/DL (ref 0–99)
POTASSIUM SERPL-SCNC: 3.9 MMOL/L (ref 3.5–5.2)
PROT SERPL-MCNC: 6.4 G/DL (ref 6–8.5)
SODIUM SERPL-SCNC: 142 MMOL/L (ref 134–144)
TRIGL SERPL-MCNC: 89 MG/DL (ref 0–149)
VLDLC SERPL CALC-MCNC: 18 MG/DL (ref 5–40)

## 2018-06-25 ENCOUNTER — OFFICE VISIT (OUTPATIENT)
Dept: INTERNAL MEDICINE CLINIC | Facility: CLINIC | Age: 58
End: 2018-06-25

## 2018-06-25 VITALS
WEIGHT: 276 LBS | HEART RATE: 65 BPM | OXYGEN SATURATION: 99 % | TEMPERATURE: 98.3 F | HEIGHT: 64 IN | BODY MASS INDEX: 47.12 KG/M2 | DIASTOLIC BLOOD PRESSURE: 82 MMHG | SYSTOLIC BLOOD PRESSURE: 134 MMHG | RESPIRATION RATE: 20 BRPM

## 2018-06-25 DIAGNOSIS — E66.01 MORBID OBESITY WITH BMI OF 45.0-49.9, ADULT (HCC): ICD-10-CM

## 2018-06-25 DIAGNOSIS — E78.00 HYPERCHOLESTEROLEMIA: ICD-10-CM

## 2018-06-25 DIAGNOSIS — F51.01 PRIMARY INSOMNIA: ICD-10-CM

## 2018-06-25 DIAGNOSIS — Z12.11 COLON CANCER SCREENING: ICD-10-CM

## 2018-06-25 DIAGNOSIS — I10 HYPERTENSION, ESSENTIAL, BENIGN: Primary | ICD-10-CM

## 2018-06-25 DIAGNOSIS — F43.9 SITUATIONAL STRESS: ICD-10-CM

## 2018-06-25 DIAGNOSIS — M17.0 PRIMARY OSTEOARTHRITIS OF BOTH KNEES: ICD-10-CM

## 2018-06-25 RX ORDER — TRAZODONE HYDROCHLORIDE 50 MG/1
50 TABLET ORAL
Qty: 90 TAB | Refills: 3 | Status: SHIPPED | OUTPATIENT
Start: 2018-06-25 | End: 2019-07-08

## 2018-06-25 NOTE — PATIENT INSTRUCTIONS
Increase trazodone to 50 mg at bedtime. Office visit in 6 months     . Low Sodium Diet (2,000 Milligram): Care Instructions  Your Care Instructions    Too much sodium causes your body to hold on to extra water. This can raise your blood pressure and force your heart and kidneys to work harder. In very serious cases, this could cause you to be put in the hospital. It might even be life-threatening. By limiting sodium, you will feel better and lower your risk of serious problems. The most common source of sodium is salt. People get most of the salt in their diet from canned, prepared, and packaged foods. Fast food and restaurant meals also are very high in sodium. Your doctor will probably limit your sodium to less than 2,000 milligrams (mg) a day. This limit counts all the sodium in prepared and packaged foods and any salt you add to your food. Follow-up care is a key part of your treatment and safety. Be sure to make and go to all appointments, and call your doctor if you are having problems. It's also a good idea to know your test results and keep a list of the medicines you take. How can you care for yourself at home? Read food labels  · Read labels on cans and food packages. The labels tell you how much sodium is in each serving. Make sure that you look at the serving size. If you eat more than the serving size, you have eaten more sodium. · Food labels also tell you the Percent Daily Value for sodium. Choose products with low Percent Daily Values for sodium. · Be aware that sodium can come in forms other than salt, including monosodium glutamate (MSG), sodium citrate, and sodium bicarbonate (baking soda). MSG is often added to Asian food. When you eat out, you can sometimes ask for food without MSG or added salt.   Buy low-sodium foods  · Buy foods that are labeled \"unsalted\" (no salt added), \"sodium-free\" (less than 5 mg of sodium per serving), or \"low-sodium\" (less than 140 mg of sodium per serving). Foods labeled \"reduced-sodium\" and \"light sodium\" may still have too much sodium. Be sure to read the label to see how much sodium you are getting. · Buy fresh vegetables, or frozen vegetables without added sauces. Buy low-sodium versions of canned vegetables, soups, and other canned goods. Prepare low-sodium meals  · Cut back on the amount of salt you use in cooking. This will help you adjust to the taste. Do not add salt after cooking. One teaspoon of salt has about 2,300 mg of sodium. · Take the salt shaker off the table. · Flavor your food with garlic, lemon juice, onion, vinegar, herbs, and spices. Do not use soy sauce, lite soy sauce, steak sauce, onion salt, garlic salt, celery salt, mustard, or ketchup on your food. · Use low-sodium salad dressings, sauces, and ketchup. Or make your own salad dressings and sauces without adding salt. · Use less salt (or none) when recipes call for it. You can often use half the salt a recipe calls for without losing flavor. Other foods such as rice, pasta, and grains do not need added salt. · Rinse canned vegetables, and cook them in fresh water. This removes some-but not all-of the salt. · Avoid water that is naturally high in sodium or that has been treated with water softeners, which add sodium. Call your local water company to find out the sodium content of your water supply. If you buy bottled water, read the label and choose a sodium-free brand. Avoid high-sodium foods  · Avoid eating:  ¨ Smoked, cured, salted, and canned meat, fish, and poultry. ¨ Ham, del toro, hot dogs, and luncheon meats. ¨ Regular, hard, and processed cheese and regular peanut butter. ¨ Crackers with salted tops, and other salted snack foods such as pretzels, chips, and salted popcorn. ¨ Frozen prepared meals, unless labeled low-sodium. ¨ Canned and dried soups, broths, and bouillon, unless labeled sodium-free or low-sodium.   ¨ Canned vegetables, unless labeled sodium-free or low-sodium. ¨ [place] fries, pizza, tacos, and other fast foods. ¨ Pickles, olives, ketchup, and other condiments, especially soy sauce, unless labeled sodium-free or low-sodium. Where can you learn more? Go to http://chandrakant-charlotte.info/. Enter K540 in the search box to learn more about \"Low Sodium Diet (2,000 Milligram): Care Instructions. \"  Current as of: May 12, 2017  Content Version: 11.4  © 5599-6122 [employer]. Care instructions adapted under license by [employer] (which disclaims liability or warranty for this information). If you have questions about a medical condition or this instruction, always ask your healthcare professional. [address] any warranty or liability for your use of this information. How to Read a Food Label to Limit Sodium: Care Instructions  Your Care Instructions  Sodium causes your body to hold on to extra water. This can raise your blood pressure and force your heart and kidneys to work harder. In very serious cases, this could cause you to be put in the hospital. It might even be life-threatening. By limiting sodium, you will feel better and lower your risk of serious problems. Processed foods, fast food, and restaurant foods are the major sources of dietary sodium. The most common name for sodium is salt. Try to limit how much sodium you eat to less than 2,300 milligrams (mg) a day. If you limit your sodium to 1,500 mg a day, you can lower your blood pressure even more. This limit counts all the salt that you eat in foods you cook or in packaged foods. Keep a list of everything you eat and drink. Follow-up care is a key part of your treatment and safety. Be sure to make and go to all appointments, and call your doctor if you are having problems. It's also a good idea to know your test results and keep a list of the medicines you take. How can you care for yourself at home?   Read ingredient lists on food labels  · Read the list of ingredients on food labels to help you find how much sodium is in a food. The label lists the ingredients in a food in descending order (from the most to the least). If salt or sodium is high on the list, there may be a lot of sodium in the food. · Know that sodium has different names. Sodium is also called monosodium glutamate (MSG, common in [place] food), sodium citrate, sodium alginate, sodium hydroxide, and sodium phosphate. Read Nutrition Facts labels  · On most foods, there is a Nutrition Facts label. This will tell you how much sodium is in one serving of food. Look at both the serving size and the sodium amount. The serving size is located at the top of the label, usually right under the \"Nutrition Facts\" title. The amount of sodium is given in the list under the title. It is given in milligrams (mg). ¨ Check the serving size carefully. A single serving is often very small, and you may eat more than one serving. If this is the case, you will eat more sodium than listed on the label. For example, if the serving size for a canned soup is 1 cup and the sodium amount is 470 mg, if you have 2 cups you will eat 940 mg of sodium. · The nutrition facts for fresh fruits and vegetables are not listed on the food. They may be listed somewhere in the store. These foods usually have no sodium or low sodium. · The Nutrition Facts label also gives you the Percent Daily Value for sodium. This is how much of the recommended amount of sodium a serving contains. The daily value for sodium is less than 2,300 mg. So if the Percent Daily Value says 50%, this means one serving is giving you half of this, or 1,150 mg. Buy low-sodium foods  · Look for foods that are made with less sodium. Watch for the following words on the label. ¨ \"Unsalted\" means there is no sodium added to the food. But there may be sodium already in the food naturally.   ¨ \"Sodium-free\" means a serving has less than 5 mg of sodium. ¨ \"Very low sodium\" means a serving has 35 mg or less of sodium. ¨ \"Low-sodium\" means a serving has 140 mg or less of sodium. · \"Reduced-sodium\" means that there is 25% less sodium than what the food normally has. This is still usually too much sodium. Try not to buy foods with this on the label. · Buy fresh vegetables, or frozen vegetables without added sauces. Buy low-sodium versions of canned vegetables, soups, and other canned goods. Where can you learn more? Go to http://chandrakantSankaty Learning Venturescharlotet.info/. Enter 26 [id number] in the search box to learn more about \"How to Read a Food Label to Limit Sodium: Care Instructions. \"  Current as of: May 12, 2017  Content Version: 11.4  © 3369-0026 [employer]. Care instructions adapted under license by [employer] (which disclaims liability or warranty for this information). If you have questions about a medical condition or this instruction, always ask your healthcare professional. [place] [address] any warranty or liability for your use of this information.

## 2018-06-25 NOTE — MR AVS SNAPSHOT
[address] [place] [address] [phone] Patient: Juan Hernandez MRN: [id number] FSW:[birth year] Visit Information Date & Time Provider Department Dept. Phone Encounter #  
 6/25/2018  9:15 AM Rosanna Sawant MD [facility] Internal Medicine of [address] [place], [place] [id number] Follow-up Instructions Return in about 6 months (around 12/25/2018) for HTN, chol, wt  . Upcoming Health Maintenance Date Due COLONOSCOPY 3/22/1976 ZOSTER VACCINE AGE 60> 1/22/2018 Influenza Age 5 to Adult 8/1/2018 BREAST CANCER SCRN MAMMOGRAM 10/27/2019 PAP AKA CERVICAL CYTOLOGY 11/16/2020 DTaP/Tdap/Td series (2 - Td) 11/10/2024 Allergies as of 6/25/2018  Review Complete On: 6/25/2018 By: Rosanna Sawant MD  
 No Known Allergies Current Immunizations  Reviewed on 6/25/2018 Name Date Influenza Vaccine 8/26/2013, 12/1/2012 Influenza Vaccine (Quad) PF 9/12/2017, 11/15/2016, 11/10/2014 Tdap 11/10/2014 Reviewed by Dora Mock LPN on 2/09/7569 at  9:16 AM  
You Were Diagnosed With   
  
 Codes Comments Hypertension, essential, benign    -  Primary ICD-10-CM: I10 
ICD-9-CM: 401.1 Hypercholesterolemia     ICD-10-CM: E78.00 ICD-9-CM: 272.0 Primary insomnia     ICD-10-CM: F51.01 
ICD-9-CM: 307.42 Morbid obesity with BMI of 45.0-49.9, adult (HCC)     ICD-10-CM: E66.01, Z68.42 
ICD-9-CM: 278.01, V85.42 Colon cancer screening     ICD-10-CM: Z12.11 ICD-9-CM: V76.51 Vitals BP Pulse Temp Resp Height(growth percentile) Weight(growth percentile) 134/82 (BP 1 Location: Left arm, BP Patient Position: Sitting) 65 98.3 °F (36.8 °C) (Oral) 20 5' 4\" (1.626 m) 276 lb (125.2 kg) SpO2 BMI OB Status Smoking Status 99% 47.38 kg/m2 Ablation Never Smoker Vitals History BMI and BSA Data  Body Mass Index Body Surface Area  
 47.38 kg/m 2 2.38 m 2  
  
  
 Preferred Pharmacy Pharmacy Name Phone [id number] [address] [phone] Your Updated Medication List  
  
   
This list is accurate as of 6/25/18 10:24 AM.  Always use your most recent med list.  
  
  
  
  
 ALEVE 220 mg Cap Generic drug:  naproxen sodium Take  by mouth. DULoxetine 60 mg capsule Commonly known as:  CYMBALTA  
take 1 capsule by mouth once daily  
  
 fluticasone 50 mcg/actuation nasal spray Commonly known as:  Thiago [place] 2 Sprays by Both Nostrils route two (2) times a day. lisinopril-hydroCHLOROthiazide 20-12.5 mg per tablet Commonly known as:  PRINZIDE, ZESTORETIC  
take 1 tablet by mouth once daily  
  
 pravastatin 20 mg tablet Commonly known as:  PRAVACHOL  
take 1 tablet by mouth NIGHTLY  
  
 topiramate 100 mg tablet Commonly known as:  TOPAMAX  
take 1 tablet by mouth twice a day  
  
 traZODone 50 mg tablet Commonly known as:  Rosmery Hernandez Take 1 Tab by mouth nightly. Prescriptions Sent to Pharmacy Refills  
 traZODone (DESYREL) 50 mg tablet 3 Sig: Take 1 Tab by mouth nightly. Class: Normal  
 Pharmacy: [id number] [address] [place] [address] Ph #: [phone] Route: Oral  
  
Follow-up Instructions Return in about 6 months (around 12/25/2018) for HTN, chol, wt  . To-Do List   
 07/25/2018 Lab:  OCCULT BLOOD, IMMUNOASSAY (FIT) Patient Instructions Increase trazodone to 50 mg at bedtime. Office visit in 6 months [place] Low Sodium Diet (2,000 Milligram): Care Instructions Your Care Instructions Too much sodium causes your body to hold on to extra water. This can raise your blood pressure and force your heart and kidneys to work harder. In very serious cases, this could cause you to be put in the hospital. It might even be life-threatening.  By limiting sodium, you will feel better and lower your risk of serious problems. The most common source of sodium is salt. People get most of the salt in their diet from canned, prepared, and packaged foods. Fast food and restaurant meals also are very high in sodium. Your doctor will probably limit your sodium to less than 2,000 milligrams (mg) a day. This limit counts all the sodium in prepared and packaged foods and any salt you add to your food. Follow-up care is a key part of your treatment and safety. Be sure to make and go to all appointments, and call your doctor if you are having problems. It's also a good idea to know your test results and keep a list of the medicines you take. How can you care for yourself at home? Read food labels · Read labels on cans and food packages. The labels tell you how much sodium is in each serving. Make sure that you look at the serving size. If you eat more than the serving size, you have eaten more sodium. · Food labels also tell you the Percent Daily Value for sodium. Choose products with low Percent Daily Values for sodium. · Be aware that sodium can come in forms other than salt, including monosodium glutamate (MSG), sodium citrate, and sodium bicarbonate (baking soda). MSG is often added to Asian food. When you eat out, you can sometimes ask for food without MSG or added salt. Buy low-sodium foods · Buy foods that are labeled \"unsalted\" (no salt added), \"sodium-free\" (less than 5 mg of sodium per serving), or \"low-sodium\" (less than 140 mg of sodium per serving). Foods labeled \"reduced-sodium\" and \"light sodium\" may still have too much sodium. Be sure to read the label to see how much sodium you are getting. · Buy fresh vegetables, or frozen vegetables without added sauces. Buy low-sodium versions of canned vegetables, soups, and other canned goods. Prepare low-sodium meals · Cut back on the amount of salt you use in cooking.  This will help you adjust to the taste. Do not add salt after cooking. One teaspoon of salt has about 2,300 mg of sodium. · Take the salt shaker off the table. · Flavor your food with garlic, lemon juice, onion, vinegar, herbs, and spices. Do not use soy sauce, lite soy sauce, steak sauce, onion salt, garlic salt, celery salt, mustard, or ketchup on your food. · Use low-sodium salad dressings, sauces, and ketchup. Or make your own salad dressings and sauces without adding salt. · Use less salt (or none) when recipes call for it. You can often use half the salt a recipe calls for without losing flavor. Other foods such as rice, pasta, and grains do not need added salt. · Rinse canned vegetables, and cook them in fresh water. This removes some-but not all-of the salt. · Avoid water that is naturally high in sodium or that has been treated with water softeners, which add sodium. Call your local water company to find out the sodium content of your water supply. If you buy bottled water, read the label and choose a sodium-free brand. Avoid high-sodium foods · Avoid eating: ¨ Smoked, cured, salted, and canned meat, fish, and poultry. ¨ Ham, del toro, hot dogs, and luncheon meats. ¨ Regular, hard, and processed cheese and regular peanut butter. ¨ Crackers with salted tops, and other salted snack foods such as pretzels, chips, and salted popcorn. ¨ Frozen prepared meals, unless labeled low-sodium. ¨ Canned and dried soups, broths, and bouillon, unless labeled sodium-free or low-sodium. ¨ Canned vegetables, unless labeled sodium-free or low-sodium. ¨ [place] fries, pizza, tacos, and other fast foods. ¨ Pickles, olives, ketchup, and other condiments, especially soy sauce, unless labeled sodium-free or low-sodium. Where can you learn more? Go to http://chandrakant-charlotte.info/. Enter W205 in the search box to learn more about \"Low Sodium Diet (2,000 Milligram): Care Instructions. \" Current as of: May 12, 2017 Content Version: 11.4 © 1626-7868 [employer]. Care instructions adapted under license by [employer] (which disclaims liability or warranty for this information). If you have questions about a medical condition or this instruction, always ask your healthcare professional. [address] any warranty or liability for your use of this information. How to Read a Food Label to Limit Sodium: Care Instructions Your Care Instructions Sodium causes your body to hold on to extra water. This can raise your blood pressure and force your heart and kidneys to work harder. In very serious cases, this could cause you to be put in the hospital. It might even be life-threatening. By limiting sodium, you will feel better and lower your risk of serious problems. Processed foods, fast food, and restaurant foods are the major sources of dietary sodium. The most common name for sodium is salt. Try to limit how much sodium you eat to less than 2,300 milligrams (mg) a day. If you limit your sodium to 1,500 mg a day, you can lower your blood pressure even more. This limit counts all the salt that you eat in foods you cook or in packaged foods. Keep a list of everything you eat and drink. Follow-up care is a key part of your treatment and safety. Be sure to make and go to all appointments, and call your doctor if you are having problems. It's also a good idea to know your test results and keep a list of the medicines you take. How can you care for yourself at home? Read ingredient lists on food labels · Read the list of ingredients on food labels to help you find how much sodium is in a food. The label lists the ingredients in a food in descending order (from the most to the least). If salt or sodium is high on the list, there may be a lot of sodium in the food. · Know that sodium has different names.  Sodium is also called monosodium glutamate (MSG, common in [place] food), sodium citrate, sodium alginate, sodium hydroxide, and sodium phosphate. Read Nutrition Facts labels · On most foods, there is a Nutrition Facts label. This will tell you how much sodium is in one serving of food. Look at both the serving size and the sodium amount. The serving size is located at the top of the label, usually right under the \"Nutrition Facts\" title. The amount of sodium is given in the list under the title. It is given in milligrams (mg). ¨ Check the serving size carefully. A single serving is often very small, and you may eat more than one serving. If this is the case, you will eat more sodium than listed on the label. For example, if the serving size for a canned soup is 1 cup and the sodium amount is 470 mg, if you have 2 cups you will eat 940 mg of sodium. · The nutrition facts for fresh fruits and vegetables are not listed on the food. They may be listed somewhere in the store. These foods usually have no sodium or low sodium. · The Nutrition Facts label also gives you the Percent Daily Value for sodium. This is how much of the recommended amount of sodium a serving contains. The daily value for sodium is less than 2,300 mg. So if the Percent Daily Value says 50%, this means one serving is giving you half of this, or 1,150 mg. Buy low-sodium foods · Look for foods that are made with less sodium. Watch for the following words on the label. ¨ \"Unsalted\" means there is no sodium added to the food. But there may be sodium already in the food naturally. ¨ \"Sodium-free\" means a serving has less than 5 mg of sodium. ¨ \"Very low sodium\" means a serving has 35 mg or less of sodium. ¨ \"Low-sodium\" means a serving has 140 mg or less of sodium. · \"Reduced-sodium\" means that there is 25% less sodium than what the food normally has. This is still usually too much sodium. Try not to buy foods with this on the label. · Buy fresh vegetables, or frozen vegetables without added sauces. Buy low-sodium versions of canned vegetables, soups, and other canned goods. Where can you learn more? Go to http://chandrakant-charlotte.info/. Enter 26 [id number] in the search box to learn more about \"How to Read a Food Label to Limit Sodium: Care Instructions. \" Current as of: May 12, 2017 Content Version: 11.4 © 0766-1488 [employer]. Care instructions adapted under license by [employer] (which disclaims liability or warranty for this information). If you have questions about a medical condition or this instruction, always ask your healthcare professional. [address] any warranty or liability for your use of this information. Introducing [facility]! Dear Norberto Segovia: Thank you for requesting a [employer] account. Our records indicate that you already have an active [employer] account. You can access your account anytime at [email or web address] Did you know that you can access your hospital and ER discharge instructions at any time in [employer]? You can also review all of your test results from your hospital stay or ER visit. Additional Information If you have questions, please visit the Frequently Asked Questions section of the [employer] website at [email or web address] Remember, [employer] is NOT to be used for urgent needs. For medical emergencies, dial 911. Now available from your iPhone and Android! Please provide this summary of care documentation to your next provider. Your primary care clinician is listed as Lynette Tinsley. If you have any questions after today's visit, please call [phone].

## 2018-06-25 NOTE — PROGRESS NOTES
Renetta Smith  Identified pt with two pt identifiers(name and ). Chief Complaint   Patient presents with    Blood Pressure Check    Cholesterol Problem       Reviewed record In preparation for visit and have obtained necessary documentation. Has info on advanced directive but has not filled them out. 1. Have you been to the ER, urgent care clinic or hospitalized since your last visit? No     2. Have you seen or consulted any other health care providers outside of the [address] [place] [address] since your last visit? Include any pap smears or colon screening. Geoff Covington reviewed with provider. Health Maintenance reviewed: Lab ordered per verbal order read back of Dr Meghana Gan.     Health Maintenance Due   Topic    COLONOSCOPY     ZOSTER VACCINE AGE 60>           Wt Readings from Last 3 Encounters:   18 276 lb (125.2 kg)   17 273 lb 8 oz (124.1 kg)   17 250 lb (113.4 kg)        Temp Readings from Last 3 Encounters:   18 98.3 °F (36.8 °C) (Oral)   17 97.4 °F (36.3 °C) (Oral)   17 98.2 °F (36.8 °C) (Oral)        BP Readings from Last 3 Encounters:   18 143/84   17 139/73   17 139/50        Pulse Readings from Last 3 Encounters:   18 65   17 73   17 68        Vitals:    18 0923   BP: 143/84   Pulse: 65   Resp: 20   Temp: 98.3 °F (36.8 °C)   TempSrc: Oral   SpO2: 99%   Weight: 276 lb (125.2 kg)   Height: 5' 4\" (1.626 m)   PainSc:   6   PainLoc: Leg          Learning Assessment:   :       Learning Assessment 11/10/2014   PRIMARY LEARNER Patient   HIGHEST LEVEL OF EDUCATION - PRIMARY LEARNER  2 YEARS OF COLLEGE   BARRIERS PRIMARY LEARNER NONE   CO-LEARNER CAREGIVER No   PRIMARY LANGUAGE ENGLISH   LEARNER PREFERENCE PRIMARY DEMONSTRATION   ANSWERED BY patient   RELATIONSHIP SELF        Depression Screening:   :       PHQ over the last two weeks 2017   Little interest or pleasure in doing things Several days   Feeling down, depressed or hopeless Several days   Total Score PHQ 2 2   Trouble falling or staying asleep, or sleeping too much -   Feeling tired or having little energy -   Poor appetite or overeating -   Feeling bad about yourself - or that you are a failure or have let yourself or your family down -   Trouble concentrating on things such as school, work, reading or watching TV -   Moving or speaking so slowly that other people could have noticed; or the opposite being so fidgety that others notice -   Thoughts of being better off dead, or hurting yourself in some way -   PHQ 9 Score -   How difficult have these problems made it for you to do your work, take care of your home and get along with others -        Fall Risk Assessment:   :     No flowsheet data found. Abuse Screening:   :     No flowsheet data found.      ADL Screening:   :       ADL Assessment 12/10/2014   Feeding yourself No Help Needed   Getting from bed to chair No Help Needed   Getting dressed No Help Needed   Bathing or showering No Help Needed   Walk across the room (includes cane/walker) No Help Needed   Using the telphone No Help Needed   Taking your medications No Help Needed   Preparing meals No Help Needed   Managing money (expenses/bills) No Help Needed   Moderately strenuous housework (laundry) No Help Needed   Shopping for personal items (toiletries/medicines) No Help Needed   Shopping for groceries No Help Needed   Driving No Help Needed   Climbing a flight of stairs No Help Needed   Getting to places beyond walking distances No Help Needed

## 2018-06-25 NOTE — PROGRESS NOTES
HISTORY OF PRESENT ILLNESS  Zack Pelayo is a 59 y.o. female. HPI  She presents for follow up of hypertension, hyperlipidemia and obesity. Diet and Lifestyle: not attempting to follow a low fat, low cholesterol diet, generally follows a low sodium diet, exercises sporadically (4-5,000 steps per day), nonsmoker  Medication compliance: compliant all of the time  Medication side effects: none  Home BP Monitoring: not done  Cardiovascular ROS:  She complains of lower extremity edema. She denies palpitations, orthopnea, exertional chest pressure/discomfort, claudication, dyspnea on exertion, dizziness   Situational stress: Moving from 2 story to one story residence to make it easier to deal with arthritis. . 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 Husbands situation with cognition is declining, but he is still able to work. He is very grouchy about move and not assisting her, although he admits that it will be worth it in the end. She is not sleeping well. She is taking only 25 of trazodone and would like to try a higher dose. Second   synvisc injection at ortho has not helped as muc first did. However she is stressing knee more with fixing up house and packing to move.     Patient Active Problem List   Diagnosis Code    Hypertension, essential, benign I10    Environmental allergies Z91.09    Hypercholesterolemia E78.00    Anxiety F41.9    Morbid obesity with BMI of 45.0-49.9, adult ([address] E66.01, Z68.42    Primary osteoarthritis of both knees M17.0    Long term (current) use of opiate analgesic Z79.891    Encounter for chronic pain management G89.29     Past Medical History:   Diagnosis Date    Arthritis     Knees    Environmental allergies     Hives     HTN (hypertension) 5/20/2013     Past Surgical History:   Procedure Laterality Date    HX GYN      3 pregnancies, 3 children     Social History     Social History    Marital status:      Spouse name: N/A    Number of children: N/A    Years of education: N/A     Social History Main Topics    Smoking status: Never Smoker    Smokeless tobacco: Never Used    Alcohol use Yes      Comment: rare    Drug use: No    Sexual activity: Yes     Birth control/ protection: None     Other Topics Concern    None     Social History Narrative     Family History   Problem Relation Age of Onset    Hypertension Mother     Diabetes Mother     Cancer Father      lung    Diabetes Sister     Hypertension Sister      No Known Allergies  Current Outpatient Prescriptions   Medication Sig Dispense Refill    topiramate (TOPAMAX) 100 mg tablet take 1 tablet by mouth twice a day 60 Tab 5    lisinopril-hydroCHLOROthiazide (PRINZIDE, ZESTORETIC) 20-12.5 mg per tablet take 1 tablet by mouth once daily 30 Tab 11    DULoxetine (CYMBALTA) 60 mg capsule take 1 capsule by mouth once daily 30 Cap 11    naproxen sodium (ALEVE) 220 mg cap Take  by mouth.  traZODone (DESYREL) 50 mg tablet Take 0.5 Tabs by mouth nightly. 30 Tab 5    pravastatin (PRAVACHOL) 20 mg tablet take 1 tablet by mouth NIGHTLY 30 Tab 11    fluticasone (FLONASE) 50 mcg/actuation nasal spray 2 Sprays by Both Nostrils route two (2) times a day. 2 Bottle prn       Review of Systems   Constitutional: Positive for malaise/fatigue. Negative for weight loss. Gastrointestinal: Negative for constipation, diarrhea and nausea. Musculoskeletal: Positive for joint pain (hips, knees, left  shoulder(in PT for this)). Negative for back pain. Neurological: Negative for tingling and focal weakness. Visit Vitals    /82 (BP 1 Location: Left arm, BP Patient Position: Sitting)    Pulse 65    Temp 98.3 °F (36.8 °C) (Oral)    Resp 20    Ht 5' 4\" (1.626 m)    Wt 276 lb (125.2 kg)    SpO2 99%    BMI 47.38 kg/m2     Physical Exam   Constitutional: She is oriented to person, place, and time. She appears well-developed and well-nourished. HENT:   Head: Normocephalic and atraumatic. Eyes: Conjunctivae are normal. Pupils are equal, round, and reactive to light. Neck: Neck supple. Carotid bruit is not present. No thyromegaly present. Cardiovascular: Normal rate, regular rhythm and normal heart sounds. PMI is not displaced. Exam reveals no gallop. No murmur heard. Pulses:       Dorsalis pedis pulses are 2+ on the right side, and 2+ on the left side. Posterior tibial pulses are 2+ on the right side, and 2+ on the left side. Pulmonary/Chest: Effort normal. She has no wheezes. She has no rhonchi. She has no rales. Abdominal: Soft. Normal appearance. She exhibits no abdominal bruit and no mass. There is no hepatosplenomegaly. There is no tenderness. Musculoskeletal: She exhibits no edema. Lymphadenopathy:     She has no cervical adenopathy. Right: No supraclavicular adenopathy present. Left: No supraclavicular adenopathy present. Neurological: She is alert and oriented to person, place, and time. No sensory deficit. Skin: Skin is warm, dry and intact. No rash noted. Psychiatric: She has a normal mood and affect. Her behavior is normal.   Nursing note and vitals reviewed. Results for orders placed or performed in visit on 73/88/42   METABOLIC PANEL, COMPREHENSIVE   Result Value Ref Range    Glucose 88 65 - 99 mg/dL    BUN 21 8 - 27 mg/dL    Creatinine 0.79 0.57 - 1.00 mg/dL    GFR est non-AA 82 >59 mL/min/1.73    GFR est AA 94 >59 mL/min/1.73    BUN/Creatinine ratio 27 12 - 28    Sodium 142 134 - 144 mmol/L    Potassium 3.9 3.5 - 5.2 mmol/L    Chloride 102 96 - 106 mmol/L    CO2 25 20 - 29 mmol/L    Calcium 9.0 8.7 - 10.3 mg/dL    Protein, total 6.4 6.0 - 8.5 g/dL    Albumin 4.0 3.6 - 4.8 g/dL    GLOBULIN, TOTAL 2.4 1.5 - 4.5 g/dL    A-G Ratio 1.7 1.2 - 2.2    Bilirubin, total <0.2 0.0 - 1.2 mg/dL    Alk.  phosphatase 80 39 - 117 IU/L    AST (SGOT) 14 0 - 40 IU/L    ALT (SGPT) 11 0 - 32 IU/L   LIPID PANEL   Result Value Ref Range    Cholesterol, total 163 100 - 199 mg/dL    Triglyceride 89 0 - 149 mg/dL    HDL Cholesterol 59 >39 mg/dL    VLDL, calculated 18 5 - 40 mg/dL    LDL, calculated 86 0 - 99 mg/dL   CVD REPORT   Result Value Ref Range    INTERPRETATION Note          ASSESSMENT and PLAN    ICD-10-CM ICD-9-CM    1. Hypertension, essential, benign I10 401.1    2. Hypercholesterolemia E78.00 272.0    3. Situational stress F43.9 V62.89    4. Primary insomnia F51.01 307.42 traZODone (DESYREL) 50 mg tablet   5. Primary osteoarthritis of both knees M17.0 715.16    6. Morbid obesity with BMI of 45.0-49.9, adult ([facility]) E66.01 278.01     Z68.42 V85.42    7. Colon cancer screening Z12.11 V76.51 OCCULT BLOOD, IMMUNOASSAY (FIT)      CANCELED: OCCULT BLOOD, IMMUNOASSAY (FIT)     Diagnoses and all orders for this visit:    1. Hypertension, essential, benign  Hypertension is controlled    2. Hypercholesterolemia  Hyperlipidemia is controlled    3. Situational stress  Discussed ways to deal with stress (exercise, distraction, etc)    4. Primary insomnia  -    Increase traZODone (DESYREL) 50 mg tablet; Take 1 Tab by mouth nightly. 5. Primary osteoarthritis of both knees  Managed by orthopedic physcian    6. Morbid obesity with BMI of 45.0-49.9, adult ([facility]  Discussed using smaller plate for portion control, keeping a food diary for awareness of food consumed, checking weight often, and increasing physical activity. Will re-evaluate next visit. 7. Colon cancer screening  -     OCCULT BLOOD, IMMUNOASSAY (FIT); Future      Follow-up Disposition:  Return in about 6 months (around 12/25/2018) for HTN, chol, wt  .  lab results and schedule of future lab studies reviewed with patient  reviewed diet, exercise and weight control  cardiovascular risk and specific lipid/LDL goals reviewed  I have discussed the diagnosis, evaluation and treatment options and the intended plan with the patient. Patient is in agreement.   The patient has received an after-visit summary and questions were answered concerning future plans. I have discussed side effects and warnings of any new medications with the patient as well.

## 2018-08-12 LAB — HEMOCCULT STL QL IA: NEGATIVE

## 2018-08-19 DIAGNOSIS — E78.00 HYPERCHOLESTEROLEMIA: ICD-10-CM

## 2018-08-19 RX ORDER — PRAVASTATIN SODIUM 20 MG/1
TABLET ORAL
Qty: 30 TAB | Refills: 11 | Status: SHIPPED | OUTPATIENT
Start: 2018-08-19 | End: 2019-08-28 | Stop reason: SDUPTHER

## 2018-11-29 ENCOUNTER — OFFICE VISIT (OUTPATIENT)
Dept: URGENT CARE | Age: 58
End: 2018-11-29

## 2018-11-29 VITALS
OXYGEN SATURATION: 98 % | HEIGHT: 64 IN | DIASTOLIC BLOOD PRESSURE: 70 MMHG | HEART RATE: 77 BPM | RESPIRATION RATE: 18 BRPM | BODY MASS INDEX: 50.02 KG/M2 | WEIGHT: 293 LBS | SYSTOLIC BLOOD PRESSURE: 139 MMHG | TEMPERATURE: 98.1 F

## 2018-11-29 DIAGNOSIS — J40 BRONCHITIS: Primary | ICD-10-CM

## 2018-11-29 DIAGNOSIS — J32.9 SINUSITIS, UNSPECIFIED CHRONICITY, UNSPECIFIED LOCATION: ICD-10-CM

## 2018-11-29 RX ORDER — ALBUTEROL SULFATE 90 UG/1
2 AEROSOL, METERED RESPIRATORY (INHALATION)
Qty: 1 INHALER | Refills: 0 | Status: SHIPPED | OUTPATIENT
Start: 2018-11-29 | End: 2020-06-16 | Stop reason: SDUPTHER

## 2018-11-29 RX ORDER — PREDNISONE 5 MG/1
TABLET ORAL
Qty: 21 TAB | Refills: 0 | Status: SHIPPED | OUTPATIENT
Start: 2018-11-29 | End: 2018-12-28 | Stop reason: ALTCHOICE

## 2018-11-29 RX ORDER — DOXYCYCLINE 100 MG/1
100 CAPSULE ORAL 2 TIMES DAILY
Qty: 20 CAP | Refills: 0 | Status: SHIPPED | OUTPATIENT
Start: 2018-11-29 | End: 2018-12-09

## 2018-11-29 NOTE — PROGRESS NOTES
Cough   The history is provided by the patient. This is a new problem. Episode onset: 1 week ago. The problem occurs every few minutes. The problem has been gradually worsening. The cough is non-productive. There has been no fever. Associated symptoms include ear congestion and rhinorrhea. Pertinent negatives include no chest pain, no chills, no sweats, no ear pain, no headaches, no sore throat, no myalgias, no shortness of breath, no wheezing, no nausea and no vomiting. Associated symptoms comments: + Chest tightness  + sinus pressure/pain. She has tried inhalers (mucinex and flonase) for the symptoms. The treatment provided no relief. She is not a smoker. Her past medical history is significant for asthma.         Past Medical History:   Diagnosis Date    Arthritis     Knees    Environmental allergies     Hives     HTN (hypertension) 5/20/2013    Hypercholesterolemia         Past Surgical History:   Procedure Laterality Date    HX GYN      3 pregnancies, 3 children         Family History   Problem Relation Age of Onset    Hypertension Mother     Diabetes Mother     Cancer Father         lung    Diabetes Sister     Hypertension Sister         Social History     Socioeconomic History    Marital status:      Spouse name: Not on file    Number of children: Not on file    Years of education: Not on file    Highest education level: Not on file   Social Needs    Financial resource strain: Not on file    Food insecurity - worry: Not on file    Food insecurity - inability: Not on file   [employer] needs - medical: Not on file   [employer] needs - non-medical: Not on file   Occupational History    Not on file   Tobacco Use    Smoking status: Never Smoker    Smokeless tobacco: Never Used   Substance and Sexual Activity    Alcohol use: Yes     Comment: rare    Drug use: No    Sexual activity: Yes     Birth control/protection: None   Other Topics Concern    Not on file   Social History Narrative    Not on file                ALLERGIES: Patient has no known allergies. Review of Systems   Constitutional: Negative for activity change, appetite change, chills and fever. HENT: Positive for congestion, rhinorrhea, sinus pressure and sinus pain. Negative for ear pain, sore throat and trouble swallowing. Respiratory: Positive for cough. Negative for shortness of breath and wheezing. Cardiovascular: Negative for chest pain and palpitations. Gastrointestinal: Negative for nausea and vomiting. Musculoskeletal: Negative for myalgias. Neurological: Negative for dizziness and headaches. Hematological: Negative for adenopathy. Vitals:    11/29/18 1458   BP: 139/70   Pulse: 77   Resp: 18   Temp: 98.1 °F (36.7 °C)   SpO2: 98%   Weight: 295 lb (133.8 kg)   Height: 5' 4\" (1.626 m)       Physical Exam   Constitutional: She appears well-developed and well-nourished. No distress. HENT:   Right Ear: External ear and ear canal normal. Tympanic membrane is erythematous. Tympanic membrane is not bulging. No middle ear effusion. Left Ear: External ear and ear canal normal. Tympanic membrane is erythematous. Tympanic membrane is not bulging. No middle ear effusion. Nose: Rhinorrhea present. Right sinus exhibits maxillary sinus tenderness and frontal sinus tenderness. Left sinus exhibits maxillary sinus tenderness and frontal sinus tenderness. Mouth/Throat: Oropharynx is clear and moist and mucous membranes are normal. No oropharyngeal exudate, posterior oropharyngeal edema, posterior oropharyngeal erythema or tonsillar abscesses. Cardiovascular: Normal rate, regular rhythm and normal heart sounds. Pulmonary/Chest: Effort normal and breath sounds normal. No respiratory distress. She has no wheezes. She has no rales. Lymphadenopathy:     She has no cervical adenopathy. Neurological: She is alert. Skin: She is not diaphoretic. Psychiatric: She has a normal mood and affect. Her behavior is normal. Judgment and thought content normal.   Nursing note and vitals reviewed. MDM    ICD-10-CM ICD-9-CM    1. Bronchitis J40 490    2. Sinusitis, unspecified chronicity, unspecified location J32.9 473.9      Medications Ordered Today   Medications    doxycycline (VIBRAMYCIN) 100 mg capsule     Sig: Take 1 Cap by mouth two (2) times a day for 10 days. Dispense:  20 Cap     Refill:  0    albuterol (PROVENTIL HFA, VENTOLIN HFA, PROAIR HFA) 90 mcg/actuation inhaler     Sig: Take 2 Puffs by inhalation every four (4) hours as needed for Wheezing. Dispense:  1 Inhaler     Refill:  0    predniSONE (STERAPRED) 5 mg dose pack     Sig: See administration instruction per 5mg dose pack     Dispense:  21 Tab     Refill:  0     The patients condition was discussed with the patient and they understand. The patient is to follow up with PCP [facility]. If signs and symptoms become worse the pt is to go to the ER. The patient is to take medications as prescribed.              Procedures

## 2018-11-29 NOTE — PATIENT INSTRUCTIONS
Bronchitis: Care Instructions  Your Care Instructions    Bronchitis is inflammation of the bronchial tubes, which carry air to the lungs. The tubes swell and produce mucus, or phlegm. The mucus and inflamed bronchial tubes make you cough. You may have trouble breathing. Most cases of bronchitis are caused by viruses like those that cause colds. Antibiotics usually do not help and they may be harmful. Bronchitis usually develops rapidly and lasts about 2 to 3 weeks in otherwise healthy people. Follow-up care is a key part of your treatment and safety. Be sure to make and go to all appointments, and call your doctor if you are having problems. It's also a good idea to know your test results and keep a list of the medicines you take. How can you care for yourself at home? · Take all medicines exactly as prescribed. Call your doctor if you think you are having a problem with your medicine. · Get some extra rest.  · Take an over-the-counter pain medicine, such as acetaminophen (Tylenol), ibuprofen (Advil, Motrin), or naproxen (Aleve) to reduce fever and relieve body aches. Read and follow all instructions on the label. · Do not take two or more pain medicines at the same time unless the doctor told you to. Many pain medicines have acetaminophen, which is Tylenol. Too much acetaminophen (Tylenol) can be harmful. · Take an over-the-counter cough medicine that contains dextromethorphan to help quiet a dry, hacking cough so that you can sleep. Avoid cough medicines that have more than one active ingredient. Read and follow all instructions on the label. · Breathe moist air from a humidifier, hot shower, or sink filled with hot water. The heat and moisture will thin mucus so you can cough it out. · Do not smoke. Smoking can make bronchitis worse. If you need help quitting, talk to your doctor about stop-smoking programs and medicines. These can increase your chances of quitting for good.   When should you call for help? Call 911 anytime you think you may need emergency care. For example, call if:    · You have severe trouble breathing.    Call your doctor now or seek immediate medical care if:    · You have new or worse trouble breathing.     · You cough up dark brown or bloody mucus (sputum).     · You have a new or higher fever.     · You have a new rash.    Watch closely for changes in your health, and be sure to contact your doctor if:    · You cough more deeply or more often, especially if you notice more mucus or a change in the color of your mucus.     · You are not getting better as expected. Where can you learn more? Go to http://chandrakant-[place].info/. Enter H333 in the search box to learn more about \"Bronchitis: Care Instructions. \"  Current as of: December 6, 2017  Content Version: 11.8  © 2590-2276 [employer] Care instructions adapted under license by [employer] (which disclaims liability or warranty for this information). If you have questions about a medical condition or this instruction, always ask your healthcare professional. [place] [address] any warranty or liability for your use of this information. Sinusitis: Care Instructions  Your Care Instructions    Sinusitis is an infection of the lining of the sinus cavities in your head. Sinusitis often follows a cold. It causes pain and pressure in your head and face. In most cases, sinusitis gets better on its own in 1 to 2 weeks. But some mild symptoms may last for several weeks. Sometimes antibiotics are needed. Follow-up care is a key part of your treatment and safety. Be sure to make and go to all appointments, and call your doctor if you are having problems. It's also a good idea to know your test results and keep a list of the medicines you take. How can you care for yourself at home?   · Take an over-the-counter pain medicine, such as acetaminophen (Tylenol), ibuprofen (Advil, Motrin), or naproxen (Aleve). Read and follow all instructions on the label. · If the doctor prescribed antibiotics, take them as directed. Do not stop taking them just because you feel better. You need to take the full course of antibiotics. · Be careful when taking over-the-counter cold or flu medicines and Tylenol at the same time. Many of these medicines have acetaminophen, which is Tylenol. Read the labels to make sure that you are not taking more than the recommended dose. Too much acetaminophen (Tylenol) can be harmful. · Breathe warm, moist air from a steamy shower, a hot bath, or a sink filled with hot water. Avoid cold, dry air. Using a humidifier in your home may help. Follow the directions for cleaning the machine. · Use saline (saltwater) nasal washes to help keep your nasal passages open and wash out mucus and bacteria. You can buy saline nose drops at a grocery store or drugstore. Or you can make your own at home by adding 1 teaspoon of salt and 1 teaspoon of baking soda to 2 cups of distilled water. If you make your own, fill a bulb syringe with the solution, insert the tip into your nostril, and squeeze gently. [place] your nose. · Put a hot, wet towel or a warm gel pack on your face 3 or 4 times a day for 5 to 10 minutes each time. · Try a decongestant nasal spray like oxymetazoline (Afrin). Do not use it for more than 3 days in a row. Using it for more than 3 days can make your congestion worse. When should you call for help? Call your doctor now or seek immediate medical care if:    · You have new or worse swelling or redness in your face or around your eyes.     · You have a new or higher fever.    Watch closely for changes in your health, and be sure to contact your doctor if:    · You have new or worse facial pain.     · The mucus from your nose becomes thicker (like pus) or has new blood in it.     · You are not getting better as expected. Where can you learn more?   Go to http://chandrakant-[place].info/. Enter I692 in the search box to learn more about \"Sinusitis: Care Instructions. \"  Current as of: March 28, 2018  Content Version: 11.8  © 2686-5989 [employer], [facility]. Care instructions adapted under license by [employer] (which disclaims liability or warranty for this information). If you have questions about a medical condition or this instruction, always ask your healthcare professional. [place] [address] any warranty or liability for your use of this information.

## 2018-12-13 RX ORDER — TOPIRAMATE 100 MG/1
TABLET, FILM COATED ORAL
Qty: 60 TAB | Refills: 5 | Status: SHIPPED | OUTPATIENT
Start: 2018-12-13 | End: 2019-07-08

## 2018-12-13 NOTE — TELEPHONE ENCOUNTER
PCP: Lucie Hu MD     Last appt: 6/25/2018     Future Appointments   Date Time Provider Geoff Coker   12/28/2018  7:45 AM Lucie Hu  [facility]          Requested Prescriptions     Pending Prescriptions Disp Refills    topiramate (TOPAMAX) 100 mg tablet 60 Tab 5     Sig: take 1 tablet by mouth twice a day

## 2018-12-26 PROBLEM — Z79.891 LONG TERM (CURRENT) USE OF OPIATE ANALGESIC: Status: RESOLVED | Noted: 2017-09-11 | Resolved: 2018-12-26

## 2018-12-26 PROBLEM — G89.29 ENCOUNTER FOR CHRONIC PAIN MANAGEMENT: Status: RESOLVED | Noted: 2017-09-17 | Resolved: 2018-12-26

## 2018-12-27 NOTE — PROGRESS NOTES
HISTORY OF PRESENT ILLNESS  Brisa Spain is a 59 y.o. female. HPI  She presents for follow up of hypertension, hyperlipidemia and obesity. She had lost weight from 323 lbs in November 2014 (with initiiation of topiramate) to 235 in May 2016. Renee Leal She has gradually gained weight since (20 lbs in past 6 months), but is still taking topiramate. Diet and Lifestyle: generally follows a low fat low cholesterol diet, generally follows a low sodium diet, does not rigorously follow a diabetic diet, sedentary, nonsmoker  Medication compliance: compliant all of the time  Medication side effects: none  Home BP Monitoring: none  Cardiovascular ROS:  She denies palpitations, orthopnea, exertional chest pressure/discomfort, claudication, lower extremity edema, dyspnea on exertion, dizziness     She complains of a month of sinus congestion with maxillary pain and ear stuffiness. She was treated with doxycycline and prednisone on 11/29. Improved somewhat, but is not resolved. She is also using Claritin and Nasones.      Patient Active Problem List   Diagnosis Code    Hypertension, essential, benign I10    Environmental allergies Z91.09    Hypercholesterolemia E78.00    Anxiety F41.9    Morbid obesity with BMI of 45.0-49.9, adult [facility] E66.01, Z68.42    Primary osteoarthritis of both knees M17.0     Past Medical History:   Diagnosis Date    Arthritis     Knees    Environmental allergies     Hives     HTN (hypertension) 5/20/2013    Hypercholesterolemia      Past Surgical History:   Procedure Laterality Date    HX GYN      3 pregnancies, 3 children     Social History     Socioeconomic History    Marital status:      Spouse name: Not on file    Number of children: Not on file    Years of education: Not on file    Highest education level: Not on file   Tobacco Use    Smoking status: Never Smoker    Smokeless tobacco: Never Used   Substance and Sexual Activity    Alcohol use: Yes     Comment: rare    Drug use: No    Sexual activity: Yes     Birth control/protection: None     Family History   Problem Relation Age of Onset    Hypertension Mother     Diabetes Mother     Cancer Father         lung    Diabetes Sister     Hypertension Sister      No Known Allergies  Current Outpatient Medications   Medication Sig Dispense Refill    loratadine (CLARITIN) 10 mg tablet Take 10 mg by mouth.  topiramate (TOPAMAX) 100 mg tablet take 1 tablet by mouth twice a day 60 Tab 5    albuterol (PROVENTIL HFA, VENTOLIN HFA, PROAIR HFA) 90 mcg/actuation inhaler Take 2 Puffs by inhalation every four (4) hours as needed for Wheezing. 1 Inhaler 0    pravastatin (PRAVACHOL) 20 mg tablet take 1 tablet by mouth NIGHTLY 30 Tab 11    traZODone (DESYREL) 50 mg tablet Take 1 Tab by mouth nightly. 90 Tab 3    lisinopril-hydroCHLOROthiazide (PRINZIDE, ZESTORETIC) 20-12.5 mg per tablet take 1 tablet by mouth once daily 30 Tab 11    DULoxetine (CYMBALTA) 60 mg capsule take 1 capsule by mouth once daily 30 Cap 11    naproxen sodium (ALEVE) 220 mg cap Take  by mouth.  fluticasone (FLONASE) 50 mcg/actuation nasal spray 2 Sprays by Both Nostrils route two (2) times a day. 2 Bottle prn             Review of Systems   Constitutional: Negative for malaise/fatigue and weight loss. Gastrointestinal: Negative for constipation, diarrhea and heartburn. Genitourinary: Positive for frequency and urgency. Musculoskeletal: Positive for back pain (in PT for pain in right lower back) and joint pain (let foot pain 2ndary to fx. Knee pain ). Neurological: Negative for dizziness, tingling and focal weakness. Visit Vitals  /68 (BP 1 Location: Left arm, BP Patient Position: Sitting)   Pulse 71   Temp 97.9 °F (36.6 °C) (Oral)   Resp 14   Ht 5' 4\" (1.626 m)   Wt 296 lb (134.3 kg)   SpO2 98%   BMI 50.81 kg/m²     Physical Exam   Constitutional: She is oriented to person, place, and time. She appears well-developed and well-nourished.    HENT: Head: Normocephalic and atraumatic. Right Ear: Tympanic membrane and ear canal normal.   Left Ear: Tympanic membrane and ear canal normal.   Nose: Mucosal edema present. Mouth/Throat: Oropharynx is clear and moist and mucous membranes are normal. Mucous membranes are not pale and not dry. No oropharyngeal exudate, posterior oropharyngeal edema or posterior oropharyngeal erythema. There are air bubbles in fluid behind TM's   Eyes: Conjunctivae are normal. Pupils are equal, round, and reactive to light. Right eye exhibits no discharge. Left eye exhibits no discharge. Neck: Neck supple. Carotid bruit is not present. No thyromegaly present. Cardiovascular: Normal rate, regular rhythm, S1 normal, S2 normal and normal heart sounds. PMI is not displaced. Exam reveals no gallop. No murmur heard. Pulses:       Dorsalis pedis pulses are 2+ on the right side, and 2+ on the left side. Posterior tibial pulses are 2+ on the right side, and 2+ on the left side. Pulmonary/Chest: Effort normal and breath sounds normal. She has no wheezes. She has no rhonchi. She has no rales. Abdominal: Soft. Normal appearance. She exhibits no abdominal bruit and no mass. There is no hepatosplenomegaly. There is no tenderness. Musculoskeletal: She exhibits no edema. Lymphadenopathy:     She has no cervical adenopathy. Right: No supraclavicular adenopathy present. Left: No supraclavicular adenopathy present. Neurological: She is alert and oriented to person, place, and time. No sensory deficit. Skin: Skin is warm, dry and intact. No rash noted. Psychiatric: She has a normal mood and affect. Her behavior is normal.   Nursing note and vitals reviewed.     Lab Results   Component Value Date/Time    Cholesterol, total 163 06/18/2018 08:17 AM    HDL Cholesterol 59 06/18/2018 08:17 AM    LDL, calculated 86 06/18/2018 08:17 AM    VLDL, calculated 18 06/18/2018 08:17 AM    Triglyceride 89 06/18/2018 08:17 AM Lab Results   Component Value Date/Time    Sodium 142 06/18/2018 08:17 AM    Potassium 3.9 06/18/2018 08:17 AM    Chloride 102 06/18/2018 08:17 AM    CO2 25 06/18/2018 08:17 AM    Glucose 88 06/18/2018 08:17 AM    BUN 21 06/18/2018 08:17 AM    Creatinine 0.79 06/18/2018 08:17 AM    BUN/Creatinine ratio 27 06/18/2018 08:17 AM    GFR est AA 94 06/18/2018 08:17 AM    GFR est non-AA 82 06/18/2018 08:17 AM    Calcium 9.0 06/18/2018 08:17 AM    Bilirubin, total <0.2 06/18/2018 08:17 AM    AST (SGOT) 14 06/18/2018 08:17 AM    Alk. phosphatase 80 06/18/2018 08:17 AM    Protein, total 6.4 06/18/2018 08:17 AM    Albumin 4.0 06/18/2018 08:17 AM    A-G Ratio 1.7 06/18/2018 08:17 AM    ALT (SGPT) 11 06/18/2018 08:17 AM         ASSESSMENT and PLAN    ICD-10-CM ICD-9-CM    1. Hypertension, essential, benign I10 401.1    2. Hypercholesterolemia E78.00 272.0 LIPID PANEL      METABOLIC PANEL, COMPREHENSIVE   3. Morbid obesity with BMI of 45.0-49.9, adult ([facility]) E66.01 278.01     Z68.42 V85.42    4. Acute non-recurrent maxillary sinusitis J01.00 461.0 amoxicillin-clavulanate (AUGMENTIN) 875-125 mg per tablet   5. Urinary frequency R35.0 788.41 URINALYSIS W/ RFLX MICROSCOPIC      oxybutynin chloride XL (DITROPAN XL) 5 mg CR tablet   6. Weight gain R63.5 783.1 TSH 3RD GENERATION     Diagnoses and all orders for this visit:    1. Hypertension, essential, benign  Hypertension is controlled    2. Hypercholesterolemia  Hyperlipidemia is controlled  -     LIPID PANEL; Future  -     METABOLIC PANEL, COMPREHENSIVE; Future    3. Morbid obesity with BMI of 45.0-49.9, adult ([facility]  Discussed using smaller plate for portion control, keeping a food diary for awareness of food consumed, checking weight often, and increasing physical activity. Will re-evaluate next visit. 4. Acute non-recurrent maxillary sinusitis  -     amoxicillin-clavulanate (AUGMENTIN) 875-125 mg per tablet; Take 1 Tab by mouth every twelve (12) hours.     5. Urinary frequency  -     URINALYSIS W/ RFLX MICROSCOPIC  -     oxybutynin chloride XL (DITROPAN XL) 5 mg CR tablet; Take 1 Tab by mouth daily. 6. Weight gain  -     TSH 3RD GENERATION; Future      Follow-up Disposition:  Return in about 6 months (around 6/28/2019) for HTN, chol, wt. Fasting lab one week prior  . lab results and schedule of future lab studies reviewed with patient  reviewed diet, exercise and weight control  cardiovascular risk and specific lipid/LDL goals reviewed  I have discussed the diagnosis, evaluation and treatment options and the intended plan with the patient. Patient understands and is in agreement. The patient has received an after-visit summary and questions were answered concerning future plans. I have discussed side effects and warnings of any new medications with the patient as well.

## 2018-12-28 ENCOUNTER — OFFICE VISIT (OUTPATIENT)
Dept: INTERNAL MEDICINE CLINIC | Facility: CLINIC | Age: 58
End: 2018-12-28

## 2018-12-28 VITALS
HEART RATE: 71 BPM | WEIGHT: 293 LBS | BODY MASS INDEX: 50.02 KG/M2 | OXYGEN SATURATION: 98 % | TEMPERATURE: 97.9 F | SYSTOLIC BLOOD PRESSURE: 138 MMHG | HEIGHT: 64 IN | DIASTOLIC BLOOD PRESSURE: 68 MMHG | RESPIRATION RATE: 14 BRPM

## 2018-12-28 DIAGNOSIS — R63.5 WEIGHT GAIN: ICD-10-CM

## 2018-12-28 DIAGNOSIS — E78.00 HYPERCHOLESTEROLEMIA: ICD-10-CM

## 2018-12-28 DIAGNOSIS — J01.00 ACUTE NON-RECURRENT MAXILLARY SINUSITIS: ICD-10-CM

## 2018-12-28 DIAGNOSIS — E66.01 MORBID OBESITY WITH BMI OF 45.0-49.9, ADULT (HCC): ICD-10-CM

## 2018-12-28 DIAGNOSIS — I10 HYPERTENSION, ESSENTIAL, BENIGN: Primary | ICD-10-CM

## 2018-12-28 DIAGNOSIS — R35.0 URINARY FREQUENCY: ICD-10-CM

## 2018-12-28 RX ORDER — OXYBUTYNIN CHLORIDE 5 MG/1
5 TABLET, EXTENDED RELEASE ORAL DAILY
Qty: 30 TAB | Refills: 5 | Status: SHIPPED | OUTPATIENT
Start: 2018-12-28 | End: 2019-07-08

## 2018-12-28 RX ORDER — LORATADINE 10 MG/1
10 TABLET ORAL
COMMUNITY
End: 2021-09-23 | Stop reason: ALTCHOICE

## 2018-12-28 RX ORDER — AMOXICILLIN AND CLAVULANATE POTASSIUM 875; 125 MG/1; MG/1
1 TABLET, FILM COATED ORAL EVERY 12 HOURS
Qty: 14 TAB | Refills: 0 | Status: SHIPPED | OUTPATIENT
Start: 2018-12-28 | End: 2019-07-08

## 2018-12-28 NOTE — PATIENT INSTRUCTIONS
Taper topiramate: Decrease the evening dose to 50 mg (a half tablet) for a week. Then decrease morning dose to half a tablet. A week later discontinue the evening dose. A week after that stop all together. Try oxybutynin XR 5 mg once a day. It it does not help with urinary frequency after about 10 days, send me a My Chart message with progress report. Stop Claritin, but continue Nasonex  Augmentin 875/125 mg twice a day with food for 7 days  Office visit in 6 months with fasting lab work a week before visit. Starting a Weight Loss Plan: Care Instructions  Your Care Instructions    If you are thinking about losing weight, it can be hard to know where to start. Your doctor can help you set up a weight loss plan that best meets your needs. You may want to take a class on nutrition or exercise, or join a weight loss support group. If you have questions about how to make changes to your eating or exercise habits, ask your doctor about seeing a registered dietitian or an exercise specialist.  It can be a big challenge to lose weight. But you do not have to make huge changes at once. Make small changes, and stick with them. When those changes become habit, add a few more changes. If you do not think you are ready to make changes right now, try to pick a date in the future. Make an appointment to see your doctor to discuss whether the time is right for you to start a plan. Follow-up care is a key part of your treatment and safety. Be sure to make and go to all appointments, and call your doctor if you are having problems. It's also a good idea to know your test results and keep a list of the medicines you take. How can you care for yourself at home? · Set realistic goals. Many people expect to lose much more weight than is likely. A weight loss of 5% to 10% of your body weight may be enough to improve your health. · Get family and friends involved to provide support.  Talk to them about why you are trying to lose weight, and ask them to help. They can help by participating in exercise and having meals with you, even if they may be eating something different. · Find what works best for you. If you do not have time or do not like to cook, a program that offers meal replacement bars or shakes may be better for you. Or if you like to prepare meals, finding a plan that includes daily menus and recipes may be best.  · Ask your doctor about other health professionals who can help you achieve your weight loss goals. ? A dietitian can help you make healthy changes in your diet. ? An exercise specialist or  can help you develop a safe and effective exercise program.  ? A counselor or psychiatrist can help you cope with issues such as depression, anxiety, or family problems that can make it hard to focus on weight loss. · Consider joining a support group for people who are trying to lose weight. Your doctor can suggest groups in your area. Where can you learn more? Go to http://chandrakant-[place].info/. Enter B552 in the search box to learn more about \"Starting a Weight Loss Plan: Care Instructions. \"  Current as of: June 26, 2018  Content Version: 11.8  © 6010-2101 [employer]. Care instructions adapted under license by [employer] (which disclaims liability or warranty for this information). If you have questions about a medical condition or this instruction, always ask your healthcare professional. [address] any warranty or liability for your use of this information.

## 2018-12-28 NOTE — PROGRESS NOTES
Dianna Watts  Identified pt with two pt identifiers(name and ). Chief Complaint   Patient presents with    Blood Pressure Check    Cholesterol Problem    Weight Management       Reviewed record In preparation for visit and have obtained necessary documentation. Has info on advanced directive but has not filled them out. 1. Have you been to the ER, urgent care clinic or hospitalized since your last visit? [facility]    2. Have you seen or consulted any other health care providers outside of the [address] [place] [address] since your last visit? Include any pap smears or colon screening. Ortho     Vitals reviewed with provider.     Health Maintenance reviewed:     Health Maintenance Due   Topic    Shingrix Vaccine Age 50> (1 of 2)          Wt Readings from Last 3 Encounters:   18 296 lb (134.3 kg)   18 295 lb (133.8 kg)   18 276 lb (125.2 kg)        Temp Readings from Last 3 Encounters:   18 97.9 °F (36.6 °C) (Oral)   18 98.1 °F (36.7 °C)   18 98.3 °F (36.8 °C) (Oral)        BP Readings from Last 3 Encounters:   18 147/83   18 139/70   18 134/82        Pulse Readings from Last 3 Encounters:   18 71   18 77   18 65        Vitals:    18 0757   BP: 147/83   Pulse: 71   Resp: 14   Temp: 97.9 °F (36.6 °C)   TempSrc: Oral   SpO2: 98%   Weight: 296 lb (134.3 kg)   Height: 5' 4\" (1.626 m)   PainSc:   5   PainLoc: Hip          Learning Assessment:   :       Learning Assessment 11/10/2014   PRIMARY LEARNER Patient   HIGHEST LEVEL OF EDUCATION - PRIMARY LEARNER  2 YEARS OF COLLEGE   BARRIERS PRIMARY LEARNER NONE   CO-LEARNER CAREGIVER No   PRIMARY LANGUAGE ENGLISH   LEARNER PREFERENCE PRIMARY DEMONSTRATION   ANSWERED BY patient   RELATIONSHIP SELF        Depression Screening:   :       PHQ over the last two weeks 2017   Little interest or pleasure in doing things Several days   Feeling down, depressed, irritable, or hopeless Several days   Total Score PHQ 2 2   Trouble falling or staying asleep, or sleeping too much -   Feeling tired or having little energy -   Poor appetite, weight loss, or overeating -   Feeling bad about yourself - or that you are a failure or have let yourself or your family down -   Trouble concentrating on things such as school, work, reading, or watching TV -   Moving or speaking so slowly that other people could have noticed; or the opposite being so fidgety that others notice -   Thoughts of being better off dead, or hurting yourself in some way -   PHQ 9 Score -   How difficult have these problems made it for you to do your work, take care of your home and get along with others -        Fall Risk Assessment:   :     No flowsheet data found. Abuse Screening:   :       Abuse Screening Questionnaire 12/28/2018   Do you ever feel afraid of your partner? N   Are you in a relationship with someone who physically or mentally threatens you? N   Is it safe for you to go home?  Y        ADL Screening:   :       ADL Assessment 12/10/2014   Feeding yourself No Help Needed   Getting from bed to chair No Help Needed   Getting dressed No Help Needed   Bathing or showering No Help Needed   Walk across the room (includes cane/walker) No Help Needed   Using the telphone No Help Needed   Taking your medications No Help Needed   Preparing meals No Help Needed   Managing money (expenses/bills) No Help Needed   Moderately strenuous housework (laundry) No Help Needed   Shopping for personal items (toiletries/medicines) No Help Needed   Shopping for groceries No Help Needed   Driving No Help Needed   Climbing a flight of stairs No Help Needed   Getting to places beyond walking distances No Help Needed

## 2019-06-20 DIAGNOSIS — R63.5 WEIGHT GAIN: ICD-10-CM

## 2019-06-20 DIAGNOSIS — E78.00 HYPERCHOLESTEROLEMIA: ICD-10-CM

## 2019-07-02 LAB
ALBUMIN SERPL-MCNC: 4.2 G/DL (ref 3.6–4.8)
ALBUMIN/GLOB SERPL: 1.8 {RATIO} (ref 1.2–2.2)
ALP SERPL-CCNC: 93 IU/L (ref 39–117)
ALT SERPL-CCNC: 16 IU/L (ref 0–32)
AST SERPL-CCNC: 18 IU/L (ref 0–40)
BILIRUB SERPL-MCNC: <0.2 MG/DL (ref 0–1.2)
BUN SERPL-MCNC: 20 MG/DL (ref 8–27)
BUN/CREAT SERPL: 23 (ref 12–28)
CALCIUM SERPL-MCNC: 9.3 MG/DL (ref 8.7–10.3)
CHLORIDE SERPL-SCNC: 100 MMOL/L (ref 96–106)
CHOLEST SERPL-MCNC: 162 MG/DL (ref 100–199)
CO2 SERPL-SCNC: 27 MMOL/L (ref 20–29)
CREAT SERPL-MCNC: 0.87 MG/DL (ref 0.57–1)
GLOBULIN SER CALC-MCNC: 2.4 G/DL (ref 1.5–4.5)
GLUCOSE SERPL-MCNC: 98 MG/DL (ref 65–99)
HDLC SERPL-MCNC: 56 MG/DL
LDLC SERPL CALC-MCNC: 90 MG/DL (ref 0–99)
POTASSIUM SERPL-SCNC: 4.1 MMOL/L (ref 3.5–5.2)
PROT SERPL-MCNC: 6.6 G/DL (ref 6–8.5)
SODIUM SERPL-SCNC: 143 MMOL/L (ref 134–144)
TRIGL SERPL-MCNC: 79 MG/DL (ref 0–149)
TSH SERPL DL<=0.005 MIU/L-ACNC: 3.7 UIU/ML (ref 0.45–4.5)
VLDLC SERPL CALC-MCNC: 16 MG/DL (ref 5–40)

## 2019-07-07 NOTE — PROGRESS NOTES
HISTORY OF PRESENT ILLNESS  Oneil Moyer is a 62 y.o. female. HPI  She presents for follow up of hypertension, hyperlipidemia and obesity. She has lost 21 lbs since December. Doing [facility]. Diet and Lifestyle: generally follows a low fat low cholesterol diet, generally follows a low sodium diet, sedentary, nonsmoker  Medication compliance: compliant all of the time  Medication side effects: none  Home BP Monitorin's/70's  Cardiovascular ROS:  She complains of lower extremity edema. (at end of day if on feet a lot). She denies palpitations, orthopnea, exertional chest pressure/discomfort, claudication, dyspnea on exertion, dizziness     She presents for follow up of anxiety. Current therapy includes: duloxetine. She is going through a divorce. She is feeling down with low energy level and not wanting to get out and do tings with family and friends. With therapy symptoms were improved  Ongoing symptoms include: poor sleep and racing thoughts  Patient denies: fear of impending doom, feelings of apprehension/worry and poor concentration/attention   Reported side effects from the treatment: none. Knee arthrtitis treated with Aleve twice a day. Tylenol did not help. We discussed use of long term NSAID with ACE or ARB and thiazide dramatically increasing risk of kidney disease.        3 most recent PHQ Screens 2019   Little interest or pleasure in doing things Several days   Feeling down, depressed, irritable, or hopeless Several days   Total Score PHQ 2 2   Trouble falling or staying asleep, or sleeping too much -   Feeling tired or having little energy -   Poor appetite, weight loss, or overeating -   Feeling bad about yourself - or that you are a failure or have let yourself or your family down -   Trouble concentrating on things such as school, work, reading, or watching TV -   Moving or speaking so slowly that other people could have noticed; or the opposite being so fidgety that others notice -   Thoughts of being better off dead, or hurting yourself in some way -   PHQ 9 Score -   How difficult have these problems made it for you to do your work, take care of your home and get along with others -       Patient Active Problem List   Diagnosis Code    Hypertension, essential, benign I10    Environmental allergies Z91.09    Hypercholesterolemia E78.00    Anxiety F41.9    Morbid obesity with BMI of 45.0-49.9, adult ([facility] E66.01, Z68.42    Primary osteoarthritis of both knees M17.0     Past Medical History:   Diagnosis Date    Arthritis     Knees    Environmental allergies     Hives     HTN (hypertension) 5/20/2013    Hypercholesterolemia      Past Surgical History:   Procedure Laterality Date    HX GYN      3 pregnancies, 3 children     Social History     Socioeconomic History    Marital status:      Spouse name: Not on file    Number of children: Not on file    Years of education: Not on file    Highest education level: Not on file   Tobacco Use    Smoking status: Never Smoker    Smokeless tobacco: Never Used   Substance and Sexual Activity    Alcohol use: Yes     Comment: rare    Drug use: No    Sexual activity: Yes     Birth control/protection: None     Family History   Problem Relation Age of Onset    Hypertension Mother     Diabetes Mother     Cancer Father         lung    Diabetes Sister     Hypertension Sister      No Known Allergies  Current Outpatient Medications   Medication Sig Dispense Refill    phentermine 37.5 mg capsule Take 37.5 mg by mouth every morning.  lisinopril-hydroCHLOROthiazide (PRINZIDE, ZESTORETIC) 20-12.5 mg per tablet TAKE 1 TABLET BY MOUTH EVERY DAY 30 Tab 11    DULoxetine (CYMBALTA) 60 mg capsule TAKE 1 CAPSULE BY MOUTH ONCE DAILY 30 Cap 5    loratadine (CLARITIN) 10 mg tablet Take 10 mg by mouth.       albuterol (PROVENTIL HFA, VENTOLIN HFA, PROAIR HFA) 90 mcg/actuation inhaler Take 2 Puffs by inhalation every four (4) hours as needed for Wheezing. 1 Inhaler 0    pravastatin (PRAVACHOL) 20 mg tablet take 1 tablet by mouth NIGHTLY 30 Tab 11    traZODone (DESYREL) 50 mg tablet Take 1 Tab by mouth nightly. 90 Tab 3    naproxen sodium (ALEVE) 220 mg cap Take  by mouth.  fluticasone (FLONASE) 50 mcg/actuation nasal spray 2 Sprays by Both Nostrils route two (2) times a day. 2 Bottle prn    amoxicillin-clavulanate (AUGMENTIN) 875-125 mg per tablet Take 1 Tab by mouth every twelve (12) hours. 14 Tab 0    oxybutynin chloride XL (DITROPAN XL) 5 mg CR tablet Take 1 Tab by mouth daily. 30 Tab 5    topiramate (TOPAMAX) 100 mg tablet take 1 tablet by mouth twice a day 60 Tab 5       Review of Systems   Constitutional: Negative for malaise/fatigue and weight loss. Gastrointestinal: Positive for constipation (Uses Smooth Move tea). Negative for diarrhea. Musculoskeletal: Positive for joint pain (knees). Negative for back pain. Neurological: Negative for tingling and focal weakness. Psychiatric/Behavioral: The patient has insomnia. Visit Vitals  /80 (BP 1 Location: Left arm, BP Patient Position: Sitting)   Pulse 73   Temp 97.7 °F (36.5 °C) (Oral)   Resp 12   Ht 5' 4\" (1.626 m)   Wt 274 lb 8 oz (124.5 kg)   SpO2 97%   BMI 47.12 kg/m²     Physical Exam   Constitutional: She is oriented to person, place, and time. She appears well-developed and well-nourished. HENT:   Head: Normocephalic and atraumatic. Eyes: Pupils are equal, round, and reactive to light. Conjunctivae are normal.   Neck: Neck supple. Carotid bruit is not present. No thyromegaly present. Cardiovascular: Normal rate, regular rhythm and normal heart sounds. PMI is not displaced. Exam reveals no gallop. No murmur heard. Pulses:       Dorsalis pedis pulses are 2+ on the right side, and 2+ on the left side. Posterior tibial pulses are 2+ on the right side, and 2+ on the left side. Pulmonary/Chest: Effort normal. She has no wheezes.  She has no rhonchi. She has no rales. Abdominal: Soft. Normal appearance. She exhibits no abdominal bruit and no mass. There is no hepatosplenomegaly. There is no tenderness. Musculoskeletal: She exhibits no edema. Lymphadenopathy:     She has no cervical adenopathy. Right: No supraclavicular adenopathy present. Left: No supraclavicular adenopathy present. Neurological: She is alert and oriented to person, place, and time. No sensory deficit. Skin: Skin is warm, dry and intact. No rash noted. Psychiatric: She has a normal mood and affect. Her behavior is normal.   Nursing note and vitals reviewed. Results for orders placed or performed in visit on 06/20/19   TSH 3RD GENERATION   Result Value Ref Range    TSH 3.700 0.450 - 4.500 uIU/mL   LIPID PANEL   Result Value Ref Range    Cholesterol, total 162 100 - 199 mg/dL    Triglyceride 79 0 - 149 mg/dL    HDL Cholesterol 56 >39 mg/dL    VLDL, calculated 16 5 - 40 mg/dL    LDL, calculated 90 0 - 99 mg/dL   METABOLIC PANEL, COMPREHENSIVE   Result Value Ref Range    Glucose 98 65 - 99 mg/dL    BUN 20 8 - 27 mg/dL    Creatinine 0.87 0.57 - 1.00 mg/dL    GFR est non-AA 72 >59 mL/min/1.73    GFR est AA 83 >59 mL/min/1.73    BUN/Creatinine ratio 23 12 - 28    Sodium 143 134 - 144 mmol/L    Potassium 4.1 3.5 - 5.2 mmol/L    Chloride 100 96 - 106 mmol/L    CO2 27 20 - 29 mmol/L    Calcium 9.3 8.7 - 10.3 mg/dL    Protein, total 6.6 6.0 - 8.5 g/dL    Albumin 4.2 3.6 - 4.8 g/dL    GLOBULIN, TOTAL 2.4 1.5 - 4.5 g/dL    A-G Ratio 1.8 1.2 - 2.2    Bilirubin, total <0.2 0.0 - 1.2 mg/dL    Alk. phosphatase 93 39 - 117 IU/L    AST (SGOT) 18 0 - 40 IU/L    ALT (SGPT) 16 0 - 32 IU/L         ASSESSMENT and PLAN    ICD-10-CM ICD-9-CM    1. Hypertension, essential, benign I10 401.1 amLODIPine (NORVASC) 2.5 mg tablet   2. Hypercholesterolemia E78.00 272.0    3. Anxiety F41.9 300.00    4. Morbid obesity with BMI of 45.0-49.9, adult ([facility]) E66.01 278.01     Z68.42 V85.42    5. Primary insomnia F51.01 307.42 traZODone (DESYREL) 100 mg tablet   6. Primary osteoarthritis of both knees M17.0 715.16    7. Screening for depression Z13.31 V79.0 BEHAV ASSMT W/SCORE & DOCD/STAND INSTRUMENT     Diagnoses and all orders for this visit:    1. Hypertension, essential, benign  Hypertension is controlled, but concerned about triad of lsinopril + hydrochlorothiazide + NSAID. We agreed to change blood pressure medication. She will send blood pressures in 2-3 weeks. -    Start amLODIPine (NORVASC) 2.5 mg tablet; Take 1 Tab by mouth daily. Stop lisinopril/hydrochlorothiazide. 2. Hypercholesterolemia  Hyperlipidemia is controlled    3. Anxiety  Increased due to life circumstances, but cannot medicate away life. Hopefully improving sleep will improve mood. If not consider increasing venlafaxine to 225 mg or adding quetiapine 25 mg.     4. Morbid obesity with BMI of 45.0-49.9, adult ([facility]  Discussed using smaller plate for portion control, keeping a food diary for awareness of food consumed, checking weight often, and increasing physical activity. Will re-evaluate next visit. 5. Primary insomnia  -    Increase  traZODone (DESYREL) 100 mg tablet; Take 1 Tab by mouth nightly. 6. Primary osteoarthritis of both knees  Continue with weight loss and Aleve. 7. Screening for depression-Negative.   -     BEHAV ASSMT W/SCORE & DOCD/STAND INSTRUMENT      Follow-up and Dispositions    · Return in about 6 months (around 1/8/2020) for HTN, chol, anx.       lab results and schedule of future lab studies reviewed with patient  reviewed diet, exercise and weight control  I have discussed the diagnosis, evaluation and treatment options and the intended plan with the patient. Patient understands and is in agreement. The patient has received an after-visit summary and questions were answered concerning future plans. I have discussed side effects and warnings of any new medications with the patient as well.

## 2019-07-08 ENCOUNTER — OFFICE VISIT (OUTPATIENT)
Dept: INTERNAL MEDICINE CLINIC | Facility: CLINIC | Age: 59
End: 2019-07-08

## 2019-07-08 VITALS
SYSTOLIC BLOOD PRESSURE: 139 MMHG | HEIGHT: 64 IN | OXYGEN SATURATION: 97 % | WEIGHT: 274.5 LBS | BODY MASS INDEX: 46.86 KG/M2 | TEMPERATURE: 97.7 F | RESPIRATION RATE: 12 BRPM | HEART RATE: 73 BPM | DIASTOLIC BLOOD PRESSURE: 80 MMHG

## 2019-07-08 DIAGNOSIS — E78.00 HYPERCHOLESTEROLEMIA: ICD-10-CM

## 2019-07-08 DIAGNOSIS — Z13.31 SCREENING FOR DEPRESSION: ICD-10-CM

## 2019-07-08 DIAGNOSIS — E66.01 MORBID OBESITY WITH BMI OF 45.0-49.9, ADULT (HCC): ICD-10-CM

## 2019-07-08 DIAGNOSIS — I10 HYPERTENSION, ESSENTIAL, BENIGN: Primary | ICD-10-CM

## 2019-07-08 DIAGNOSIS — M17.0 PRIMARY OSTEOARTHRITIS OF BOTH KNEES: ICD-10-CM

## 2019-07-08 DIAGNOSIS — F51.01 PRIMARY INSOMNIA: ICD-10-CM

## 2019-07-08 DIAGNOSIS — F41.9 ANXIETY: ICD-10-CM

## 2019-07-08 RX ORDER — AMLODIPINE BESYLATE 2.5 MG/1
2.5 TABLET ORAL DAILY
Qty: 30 TAB | Refills: 11 | Status: SHIPPED | OUTPATIENT
Start: 2019-07-08 | End: 2019-08-06

## 2019-07-08 RX ORDER — PHENTERMINE HYDROCHLORIDE 37.5 MG/1
37.5 CAPSULE ORAL
COMMUNITY
End: 2021-05-14

## 2019-07-08 RX ORDER — TRAZODONE HYDROCHLORIDE 100 MG/1
100 TABLET ORAL
Qty: 30 TAB | Refills: 11 | Status: SHIPPED | OUTPATIENT
Start: 2019-07-08 | End: 2019-07-19 | Stop reason: SDUPTHER

## 2019-07-08 NOTE — PATIENT INSTRUCTIONS
Stop lisinopril/hydrochlorothiazide and start amlodipine 2.5 mg daily for blood pressure. Send me blood pressure readings in about 3 weeks. Increase trazodone to 100 mg at bedtime. Office visit in 6 months Anxiety Disorder: Care Instructions Your Care Instructions Anxiety is a normal reaction to stress. Difficult situations can cause you to have symptoms such as sweaty palms and a nervous feeling. In an anxiety disorder, the symptoms are far more severe. Constant worry, muscle tension, trouble sleeping, nausea and diarrhea, and other symptoms can make normal daily activities difficult or impossible. These symptoms may occur for no reason, and they can affect your work, school, or social life. Medicines, counseling, and self-care can all help. Follow-up care is a key part of your treatment and safety. Be sure to make and go to all appointments, and call your doctor if you are having problems. It's also a good idea to know your test results and keep a list of the medicines you take. How can you care for yourself at home? · Take medicines exactly as directed. Call your doctor if you think you are having a problem with your medicine. · Go to your counseling sessions and follow-up appointments. · Recognize and accept your anxiety. Then, when you are in a situation that makes you anxious, say to yourself, \"This is not an emergency. I feel uncomfortable, but I am not in danger. I can keep going even if I feel anxious. \" · Be kind to your body: 
? Relieve tension with exercise or a massage. ? Get enough rest. 
? Avoid alcohol, caffeine, nicotine, and illegal drugs. They can increase your anxiety level and cause sleep problems. ? Learn and do relaxation techniques. See below for more about these techniques. · Engage your mind. Get out and do something you enjoy. Go to a funny movie, or take a walk or hike. Plan your day. Having too much or too little to do can make you anxious. · Keep a record of your symptoms. Discuss your fears with a good friend or family member, or join a support group for people with similar problems. Talking to others sometimes relieves stress. · Get involved in social groups, or volunteer to help others. Being alone sometimes makes things seem worse than they are. · Get at least 30 minutes of exercise on most days of the week to relieve stress. Walking is a good choice. You also may want to do other activities, such as running, swimming, cycling, or playing tennis or team sports. Relaxation techniques Do relaxation exercises 10 to 20 minutes a day. You can play soothing, relaxing music while you do them, if you wish. · Tell others in your house that you are going to do your relaxation exercises. Ask them not to disturb you. · Find a comfortable place, away from all distractions and noise. · Lie down on your back, or sit with your back straight. · Focus on your breathing. Make it slow and steady. · Breathe in through your nose. Breathe out through either your nose or mouth. · Breathe deeply, filling up the area between your navel and your rib cage. Breathe so that your belly goes up and down. · Do not hold your breath. · Breathe like this for 5 to 10 minutes. Notice the feeling of calmness throughout your whole body. As you continue to breathe slowly and deeply, relax by doing the following for another 5 to 10 minutes: · Tighten and relax each muscle group in your body. You can begin at your toes and work your way up to your head. · Imagine your muscle groups relaxing and becoming heavy. · Empty your mind of all thoughts. · Let yourself relax more and more deeply. · Become aware of the state of calmness that surrounds you. · When your relaxation time is over, you can bring yourself back to alertness by moving your fingers and toes and then your hands and feet and then stretching and moving your entire body.  Sometimes people fall asleep during relaxation, but they usually wake up shortly afterward. · Always give yourself time to return to full alertness before you drive a car or do anything that might cause an accident if you are not fully alert. Never play a relaxation tape while you drive a car. When should you call for help? Call 911 anytime you think you may need emergency care. For example, call if: 
  · You feel you cannot stop from hurting yourself or someone else.  
Heather Berrios the numbers for these national suicide hotlines: 0-966-831-TALK (3-561.296.5355) and 3-913-LDZSULK (6-706.499.9760). If you or someone you know talks about suicide or feeling hopeless, get help right away. 
 Watch closely for changes in your health, and be sure to contact your doctor if: 
  · You have anxiety or fear that affects your life.  
  · You have symptoms of anxiety that are new or different from those you had before. Where can you learn more? Go to http://chandrakant-[place].info/. Enter P754 in the search box to learn more about \"Anxiety Disorder: Care Instructions. \" Current as of: September 11, 2018 Content Version: 11.9 © 3409-1461 [employer]. Care instructions adapted under license by [employer] (which disclaims liability or warranty for this information). If you have questions about a medical condition or this instruction, always ask your healthcare professional. [place] [address] any warranty or liability for your use of this information.

## 2019-07-08 NOTE — PROGRESS NOTES
Marty Wiley  Identified pt with two pt identifiers(name and ). Chief Complaint   Patient presents with    Hypertension    Cholesterol Problem    Weight Management       Reviewed record In preparation for visit and have obtained necessary documentation. Has info on advanced directive but has not filled them out. 1. Have you been to the ER, urgent care clinic or hospitalized since your last visit? No     2. Have you seen or consulted any other health care providers outside of the [address] [place] [address] since your last visit? Include any pap smears or colon screening. No    Vitals reviewed with provider.  Waist 46 inches    Health Maintenance reviewed:     Health Maintenance Due   Topic    Shingrix Vaccine Age 49> (1 of 2)    BREAST CANCER SCRN MAMMOGRAM           Wt Readings from Last 3 Encounters:   19 274 lb 8 oz (124.5 kg)   18 296 lb (134.3 kg)   18 295 lb (133.8 kg)        Temp Readings from Last 3 Encounters:   19 97.7 °F (36.5 °C) (Oral)   18 97.9 °F (36.6 °C) (Oral)   18 98.1 °F (36.7 °C)        BP Readings from Last 3 Encounters:   19 139/80   18 138/68   18 139/70        Pulse Readings from Last 3 Encounters:   19 73   18 71   18 77        Vitals:    19 0752   BP: 139/80   Pulse: 73   Resp: 12   Temp: 97.7 °F (36.5 °C)   TempSrc: Oral   SpO2: 97%   Weight: 274 lb 8 oz (124.5 kg)   Height: 5' 4\" (1.626 m)   PainSc:   5   PainLoc: Knee          Learning Assessment:   :       Learning Assessment 11/10/2014   PRIMARY LEARNER Patient   HIGHEST LEVEL OF EDUCATION - PRIMARY LEARNER  2 YEARS OF COLLEGE   BARRIERS PRIMARY LEARNER NONE   CO-LEARNER CAREGIVER No   PRIMARY LANGUAGE ENGLISH   LEARNER PREFERENCE PRIMARY DEMONSTRATION   ANSWERED BY patient   RELATIONSHIP SELF        Depression Screening:   :       3 most recent PHQ Screens 2019   Little interest or pleasure in doing things Several days   Feeling down, depressed, irritable, or hopeless Several days   Total Score PHQ 2 2   Trouble falling or staying asleep, or sleeping too much -   Feeling tired or having little energy -   Poor appetite, weight loss, or overeating -   Feeling bad about yourself - or that you are a failure or have let yourself or your family down -   Trouble concentrating on things such as school, work, reading, or watching TV -   Moving or speaking so slowly that other people could have noticed; or the opposite being so fidgety that others notice -   Thoughts of being better off dead, or hurting yourself in some way -   PHQ 9 Score -   How difficult have these problems made it for you to do your work, take care of your home and get along with others -        Fall Risk Assessment:   :     No flowsheet data found. Abuse Screening:   :       Abuse Screening Questionnaire 12/28/2018   Do you ever feel afraid of your partner? N   Are you in a relationship with someone who physically or mentally threatens you? N   Is it safe for you to go home?  Y        ADL Screening:   :       ADL Assessment 12/10/2014   Feeding yourself No Help Needed   Getting from bed to chair No Help Needed   Getting dressed No Help Needed   Bathing or showering No Help Needed   Walk across the room (includes cane/walker) No Help Needed   Using the telphone No Help Needed   Taking your medications No Help Needed   Preparing meals No Help Needed   Managing money (expenses/bills) No Help Needed   Moderately strenuous housework (laundry) No Help Needed   Shopping for personal items (toiletries/medicines) No Help Needed   Shopping for groceries No Help Needed   Driving No Help Needed   Climbing a flight of stairs No Help Needed   Getting to places beyond walking distances No Help Needed

## 2019-09-06 ENCOUNTER — HOSPITAL ENCOUNTER (OUTPATIENT)
Dept: CT IMAGING | Age: 59
Discharge: HOME | End: 2019-09-06
Attending: ORTHOPAEDIC SURGERY
Payer: COMMERCIAL

## 2019-09-06 DIAGNOSIS — M79.672 CHRONIC FOOT PAIN, LEFT: ICD-10-CM

## 2019-09-06 DIAGNOSIS — G89.29 CHRONIC FOOT PAIN, LEFT: ICD-10-CM

## 2019-09-06 PROCEDURE — 73700 CT LOWER EXTREMITY W/O DYE: CPT

## 2019-12-06 ENCOUNTER — DOCUMENTATION ONLY (OUTPATIENT)
Dept: INTERNAL MEDICINE CLINIC | Facility: CLINIC | Age: 59
End: 2019-12-06

## 2019-12-16 PROBLEM — G56.03 BILATERAL CARPAL TUNNEL SYNDROME: Status: ACTIVE | Noted: 2019-12-16

## 2020-03-15 RX ORDER — LISINOPRIL AND HYDROCHLOROTHIAZIDE 12.5; 2 MG/1; MG/1
TABLET ORAL
Qty: 30 TAB | Refills: 11 | Status: SHIPPED | OUTPATIENT
Start: 2020-03-15 | End: 2021-03-10 | Stop reason: SDUPTHER

## 2020-04-21 ENCOUNTER — VIRTUAL VISIT (OUTPATIENT)
Dept: INTERNAL MEDICINE CLINIC | Facility: CLINIC | Age: 60
End: 2020-04-21

## 2020-04-21 DIAGNOSIS — F41.9 ANXIETY: ICD-10-CM

## 2020-04-21 DIAGNOSIS — I10 HYPERTENSION, ESSENTIAL, BENIGN: Primary | ICD-10-CM

## 2020-04-21 DIAGNOSIS — E66.01 MORBID OBESITY WITH BMI OF 45.0-49.9, ADULT (HCC): ICD-10-CM

## 2020-04-21 DIAGNOSIS — Z12.31 ENCOUNTER FOR SCREENING MAMMOGRAM FOR MALIGNANT NEOPLASM OF BREAST: ICD-10-CM

## 2020-04-21 DIAGNOSIS — E78.00 HYPERCHOLESTEROLEMIA: ICD-10-CM

## 2020-04-21 DIAGNOSIS — Z91.09 ENVIRONMENTAL ALLERGIES: ICD-10-CM

## 2020-04-21 DIAGNOSIS — Z13.31 SCREENING FOR DEPRESSION: ICD-10-CM

## 2020-04-21 DIAGNOSIS — Z12.11 SCREEN FOR COLON CANCER: ICD-10-CM

## 2020-04-21 DIAGNOSIS — J01.00 ACUTE NON-RECURRENT MAXILLARY SINUSITIS: ICD-10-CM

## 2020-04-21 RX ORDER — AMOXICILLIN AND CLAVULANATE POTASSIUM 875; 125 MG/1; MG/1
1 TABLET, FILM COATED ORAL EVERY 12 HOURS
Qty: 14 TAB | Refills: 0 | Status: SHIPPED | OUTPATIENT
Start: 2020-04-21 | End: 2020-06-16 | Stop reason: ALTCHOICE

## 2020-04-21 NOTE — Clinical Note
Will need OV in 6 months: Follow up HTN, chol, anxiety and GERD. I did not ask her, but she might be a candidate to see NP Efrain.

## 2020-04-21 NOTE — PROGRESS NOTES
Consent: Lucia Councilman, who was seen by synchronous (real-time) audio-video technology, and/or her healthcare decision maker, is aware that this patient-initiated, Telehealth encounter on 4/21/2020 is a billable service, with coverage as determined by her insurance carrier. She is aware that she may receive a bill and has provided verbal consent to proceed: Yes. Assessment & Plan:   Diagnoses and all orders for this visit:    1. Hypertension, essential, benign  Hypertension is borderline controlled. 2. Hypercholesterolemia  Hyperlipidemia is controlled  -     LIPID PANEL; Future  -     METABOLIC PANEL, COMPREHENSIVE; Future    3. Anxiety  Some increase I symptoms related to life stress including repercussions of pandemic. Offered therapist, but she feels she is coping adequately. 4. Acute non-recurrent maxillary sinusitis  -     amoxicillin-clavulanate (AUGMENTIN) 875-125 mg per tablet; Take 1 Tab by mouth every twelve (12) hours. 5. Environmental allergies  [place] current therapy. She may want referral to allergist and will contact us if she is ready to proceed. 6. Encounter for screening mammogram for malignant neoplasm of breast  -     BHARATH MAMMO BI SCREENING INCL CAD; Future    7. Screen for colon cancer  -     OCCULT BLOOD IMMUNOASSAY,DIAGNOSTIC; Future    8. Screening for depression-Negative  -     BEHAV ASSMT W/SCORE & DOCD/STAND INSTRUMENT    9. Morbid obesity with BMI of 45.0-49.9, adult ([address]  Discussed using smaller plate for portion control, keeping a food diary for awareness of food consumed, checking weight often, and increasing physical activity. Will re-evaluate next visit. Follow-up and Dispositions       Follow up in 6 months for HTN, chol, anxiety Fasting lab one week prior. 712  Subjective:   Lucia Councilman is a 56 y.o. female who was seen for Allergies    She complains of 2 months of allergy symptoms.  In the past 2 weeks she has felt much worse with right sided facial pain, right ear congestion, increased post nasal drainage, sore throat and fatigue. She continues to have mild sneezing and cough. . She denies wheezing, fever, chills, or night sweats. Clinical course has been gradually worsening since that time. She has tried Claritin and Flonase that she feels are no longer helping. Patient is non-smoker      She presents for follow up of hypertension, hyperlipidemia and obesity. Diet and Lifestyle: generally follows a low fat low cholesterol diet, generally follows a low sodium diet, sedentary, nonsmoker  Medication compliance: compliant all of the time  Medication side effects: none  Home BP Monitorin's/60's sometimes as high as 140's  Cardiovascular ROS:  She denies palpitations, orthopnea, exertional chest pressure/discomfort, claudication, lower extremity edema, dyspnea on exertion, dizziness     She presents for follow up of anxiety. Current therapy includes: duloxetine and trazodone. With therapy symptoms were improved; but are worse recently. She works in the [employer] and never knows day to day what what is upcoming. Her daughter will give birth soon. Had problems after second CTS surgery. Pandemic of course weighs on her mind  Ongoing symptoms include:  fear of impending doom, poor concentration/attention, racing thoughts and restlessness  Reported side effects from the treatment: none.     3 most recent PHQ Screens 2020   Little interest or pleasure in doing things Several days   Feeling down, depressed, irritable, or hopeless Several days   Total Score PHQ 2 2   Trouble falling or staying asleep, or sleeping too much -   Feeling tired or having little energy -   Poor appetite, weight loss, or overeating -   Feeling bad about yourself - or that you are a failure or have let yourself or your family down -   Trouble concentrating on things such as school, work, reading, or watching TV -   Moving or speaking so slowly that other people could have noticed; or the opposite being so fidgety that others notice -   Thoughts of being better off dead, or hurting yourself in some way -   PHQ 9 Score -   How difficult have these problems made it for you to do your work, take care of your home and get along with others -       Allergies   Allergen Reactions    Amlodipine Angioedema       Patient Active Problem List   Diagnosis Code    Hypertension, essential, benign I10    Environmental allergies Z91.09    Hypercholesterolemia E78.00    Anxiety F41.9    Morbid obesity with BMI of 45.0-49.9, adult ([facility] E66.01, Z68.42    Primary osteoarthritis of both knees M17.0    Bilateral carpal tunnel syndrome G56.03     Past Medical History:   Diagnosis Date    Arthritis     Knees    Environmental allergies     Hives     HTN (hypertension) 5/20/2013    Hypercholesterolemia      Past Surgical History:   Procedure Laterality Date    HX GYN      3 pregnancies, 3 children     Social History     Socioeconomic History    Marital status:      Spouse name: Not on file    Number of children: Not on file    Years of education: Not on file    Highest education level: Not on file   Tobacco Use    Smoking status: Never Smoker    Smokeless tobacco: Never Used   Substance and Sexual Activity    Alcohol use: Yes     Comment: rare    Drug use: No    Sexual activity: Yes     Birth control/protection: None     Family History   Problem Relation Age of Onset    Hypertension Mother     Diabetes Mother     Cancer Father         lung    Diabetes Sister     Hypertension Sister      Allergies   Allergen Reactions    Amlodipine Angioedema     Current Outpatient Medications   Medication Sig Dispense Refill    amoxicillin-clavulanate (AUGMENTIN) 875-125 mg per tablet Take 1 Tab by mouth every twelve (12) hours.  14 Tab 0    lisinopril-hydroCHLOROthiazide (PRINZIDE, ZESTORETIC) 20-12.5 mg per tablet TAKE 1 TABLET BY MOUTH EVERY DAY 30 Tab 11    DULoxetine (CYMBALTA) 60 mg capsule TAKE ONE CAPSULE BY MOUTH EVERY DAY 30 Cap 11    pravastatin (PRAVACHOL) 20 mg tablet TAKE 1 TABLET BY MOUTH EVERY EVENING 30 Tab 11    traZODone (DESYREL) 50 mg tablet TAKE 1 TABLET BY MOUTH EVERY EVENING 90 Tab 3    loratadine (CLARITIN) 10 mg tablet Take 10 mg by mouth.  albuterol (PROVENTIL HFA, VENTOLIN HFA, PROAIR HFA) 90 mcg/actuation inhaler Take 2 Puffs by inhalation every four (4) hours as needed for Wheezing. 1 Inhaler 0    naproxen sodium (ALEVE) 220 mg cap Take  by mouth.  fluticasone (FLONASE) 50 mcg/actuation nasal spray 2 Sprays by Both Nostrils route two (2) times a day. 2 Bottle prn    phentermine 37.5 mg capsule Take 37.5 mg by mouth every morning. ROS        Objective:   Vital Signs: (As obtained by patient/caregiver at home)  There were no vitals taken for this visit.        [INSTRUCTIONS:  \"[x]\" Indicates a positive item  \"[]\" Indicates a negative item  -- DELETE ALL ITEMS NOT EXAMINED]    Constitutional: [x] Appears well-developed and well-nourished [x] No apparent distress      [] Abnormal -     Mental status: [x] Alert and awake  [x] Oriented to person/place/time [x] Able to follow commands    [] Abnormal -     Eyes:   EOM    [x]  Normal    [] Abnormal -   Sclera  [x]  Normal    [] Abnormal -          Discharge [x]  None visible   [] Abnormal -     HENT: [x] Normocephalic, atraumatic  [] Abnormal -   [x] Mouth/Throat: Mucous membranes are moist    External Ears [x] Normal  [] Abnormal -    Neck: [x] No visualized mass [] Abnormal -     Pulmonary/Chest: [x] Respiratory effort normal   [x] No visualized signs of difficulty breathing or respiratory distress        [] Abnormal -      Musculoskeletal:   [x] Normal gait with no signs of ataxia         [x] Normal range of motion of neck        [] Abnormal -     Neurological:        [x] No Facial Asymmetry (Cranial nerve 7 motor function) (limited exam due to video visit)          [x] No gaze palsy        [] Abnormal -          Skin:        [x] No significant exanthematous lesions or discoloration noted on facial skin         [] Abnormal -            Psychiatric:       [x] Normal Affect [] Abnormal -        [x] No Hallucinations    Other pertinent observable physical exam findings:-      Lab Results   Component Value Date/Time    Cholesterol, total 162 07/01/2019 08:24 AM    HDL Cholesterol 56 07/01/2019 08:24 AM    LDL, calculated 90 07/01/2019 08:24 AM    VLDL, calculated 16 07/01/2019 08:24 AM    Triglyceride 79 07/01/2019 08:24 AM     Lab Results   Component Value Date/Time    Sodium 143 07/01/2019 08:24 AM    Potassium 4.1 07/01/2019 08:24 AM    Chloride 100 07/01/2019 08:24 AM    CO2 27 07/01/2019 08:24 AM    Glucose 98 07/01/2019 08:24 AM    BUN 20 07/01/2019 08:24 AM    Creatinine 0.87 07/01/2019 08:24 AM    BUN/Creatinine ratio 23 07/01/2019 08:24 AM    GFR est AA 83 07/01/2019 08:24 AM    GFR est non-AA 72 07/01/2019 08:24 AM    Calcium 9.3 07/01/2019 08:24 AM    Bilirubin, total <0.2 07/01/2019 08:24 AM    AST (SGOT) 18 07/01/2019 08:24 AM    Alk. phosphatase 93 07/01/2019 08:24 AM    Protein, total 6.6 07/01/2019 08:24 AM    Albumin 4.2 07/01/2019 08:24 AM    A-G Ratio 1.8 07/01/2019 08:24 AM    ALT (SGPT) 16 07/01/2019 08:24 AM       We discussed the expected course, resolution and complications of the diagnosis(es) in detail. Medication risks, benefits, costs, interactions, and alternatives were discussed as indicated. I advised her to contact the office if her condition worsens, changes or fails to improve as anticipated. She expressed understanding with the diagnosis(es) and plan. Tree Quiles is a 56 y.o. female being evaluated by a video visit encounter for concerns as above. A caregiver was present when appropriate.  Due to this being a TeleHealth encounter (During [place] [address] public health emergency), evaluation of the following organ systems was limited: Vitals/Constitutional/EENT/Resp/CV/GI//MS/Neuro/Skin/Heme-Lymph-Imm. Pursuant to the emergency declaration under the [address] [place] [address], [facility] waiver authority and the [employer] Act, this Virtual  Visit was conducted, with patient's (and/or legal guardian's) consent, to reduce the patient's risk of exposure to COVID-19 and provide necessary medical care. Services were provided through a video synchronous discussion virtually to substitute for in-person clinic visit. Patient and provider were located at their individual homes.         Patti Kenyon MD

## 2020-04-21 NOTE — PROGRESS NOTES
Sudhir Selby  Identified pt with two pt identifiers(name and ). Chief Complaint   Patient presents with    Allergies       Reviewed record In preparation for visit and have obtained necessary documentation. Has info on advanced directive but has not filled them out. 1. Have you been to the ER, urgent care clinic or hospitalized since your last visit? surgery center twice in  [facility] 2020    2. Have you seen or consulted any other health care providers outside of the [address] [place] [address] since your last visit? Include any pap smears or colon screening. Ortho     Vitals reviewed with provider.     Health Maintenance reviewed:     Health Maintenance Due   Topic    Shingrix Vaccine Age 49> (1 of 2)    FOBT Q1Y Age,18+     Breast Cancer Screen Mammogram           Wt Readings from Last 3 Encounters:   19 274 lb 8 oz (124.5 kg)   18 296 lb (134.3 kg)   18 295 lb (133.8 kg)        Temp Readings from Last 3 Encounters:   19 97.7 °F (36.5 °C) (Oral)   18 97.9 °F (36.6 °C) (Oral)   18 98.1 °F (36.7 °C)        BP Readings from Last 3 Encounters:   19 139/80   18 138/68   18 139/70        Pulse Readings from Last 3 Encounters:   19 73   18 71   18 77        Vitals:    20 1319   PainSc:   5   PainLoc: Generalized          Learning Assessment:   :       Learning Assessment 11/10/2014   PRIMARY LEARNER Patient   HIGHEST LEVEL OF EDUCATION - PRIMARY LEARNER  2 YEARS OF COLLEGE   BARRIERS PRIMARY LEARNER NONE   CO-LEARNER CAREGIVER No   PRIMARY LANGUAGE ENGLISH   LEARNER PREFERENCE PRIMARY DEMONSTRATION   ANSWERED BY patient   RELATIONSHIP SELF        Depression Screening:   :       3 most recent PHQ Screens 2020   Little interest or pleasure in doing things Several days   Feeling down, depressed, irritable, or hopeless Several days   Total Score PHQ 2 2   Trouble falling or staying asleep, or sleeping too much -   Feeling tired or having little energy -   Poor appetite, weight loss, or overeating -   Feeling bad about yourself - or that you are a failure or have let yourself or your family down -   Trouble concentrating on things such as school, work, reading, or watching TV -   Moving or speaking so slowly that other people could have noticed; or the opposite being so fidgety that others notice -   Thoughts of being better off dead, or hurting yourself in some way -   PHQ 9 Score -   How difficult have these problems made it for you to do your work, take care of your home and get along with others -        Fall Risk Assessment:   :     No flowsheet data found. Abuse Screening:   :       Abuse Screening Questionnaire 4/21/2020 12/28/2018   Do you ever feel afraid of your partner? N N   Are you in a relationship with someone who physically or mentally threatens you? N N   Is it safe for you to go home?  Y Y        ADL Screening:   :       ADL Assessment 12/10/2014   Feeding yourself No Help Needed   Getting from bed to chair No Help Needed   Getting dressed No Help Needed   Bathing or showering No Help Needed   Walk across the room (includes cane/walker) No Help Needed   Using the telphone No Help Needed   Taking your medications No Help Needed   Preparing meals No Help Needed   Managing money (expenses/bills) No Help Needed   Moderately strenuous housework (laundry) No Help Needed   Shopping for personal items (toiletries/medicines) No Help Needed   Shopping for groceries No Help Needed   Driving No Help Needed   Climbing a flight of stairs No Help Needed   Getting to places beyond walking distances No Help Needed 95

## 2020-06-16 ENCOUNTER — VIRTUAL VISIT (OUTPATIENT)
Dept: INTERNAL MEDICINE CLINIC | Facility: CLINIC | Age: 60
End: 2020-06-16

## 2020-06-16 DIAGNOSIS — J01.91 ACUTE RECURRENT SINUSITIS, UNSPECIFIED LOCATION: Primary | ICD-10-CM

## 2020-06-16 RX ORDER — ALBUTEROL SULFATE 90 UG/1
2 AEROSOL, METERED RESPIRATORY (INHALATION)
Qty: 1 INHALER | Refills: 0 | Status: SHIPPED | OUTPATIENT
Start: 2020-06-16 | End: 2022-02-03 | Stop reason: SDUPTHER

## 2020-06-16 RX ORDER — AMOXICILLIN AND CLAVULANATE POTASSIUM 562.5; 437.5; 62.5 MG/1; MG/1; MG/1
2 TABLET, FILM COATED, EXTENDED RELEASE ORAL 2 TIMES DAILY
Qty: 40 TAB | Refills: 0 | Status: SHIPPED | OUTPATIENT
Start: 2020-06-16 | End: 2021-05-14

## 2020-06-16 NOTE — PROGRESS NOTES
Bettye Elam is a 56 y.o. female who was seen by synchronous (real-time) audio-video technology on 6/16/2020. Consent: Bettye Elam, who was seen by synchronous (real-time) audio-video technology, and/or her healthcare decision maker, is aware that this patient-initiated, Telehealth encounter on 6/16/2020 is a billable service, with coverage as determined by her insurance carrier. She is aware that she may receive a bill and has provided verbal consent to proceed: Yes. Assessment & Plan:   Diagnoses and all orders for this visit:    1. Acute recurrent sinusitis, unspecified location  -     amoxicillin-clavulanate (AUGMENTIN) 1,000-62.5 mg ER tablet; Take 2 Tabs by mouth two (2) times a day. Other orders  -     albuterol (PROVENTIL HFA, VENTOLIN HFA, PROAIR HFA) 90 mcg/actuation inhaler; Take 2 Puffs by inhalation every four (4) hours as needed for Wheezing. I spent at least 20 minutes on this visit with this established patient. Subjective:   Bettye Elam is a 56 y.o. female who was seen for Allergies (congestion since February); Shortness of Breath (since February); and Cough (2 weeks ago)      She complains of post nasal drainage and cough that is worse for the past 2 weeks. She was treated for sinusitis at the end of April with Augmentin 875 and improved. Mucous seems to hang base of throat. She ccoughs up a lot of pale yellow mucous. She has not noticed wheezing but is dyspneic walking about a 100 ft. She had a similar episode with her breathing in Feb and albuterol helped. She has associated symptoms of ear congestion and popping and mild sore throat. She denies fever, facial pain, headache, ear pain, sneezing. She  continuse to use Flonase and Claritin, but they are not helping.      Patient Active Problem List   Diagnosis Code    Hypertension, essential, benign I10    Environmental allergies Z91.09    Hypercholesterolemia E78.00    Anxiety F41.9    Morbid obesity with BMI of 45.0-49.9, adult ([facility] E66.01, Z68.42    Primary osteoarthritis of both knees M17.0    Bilateral carpal tunnel syndrome G56.03     Past Medical History:   Diagnosis Date    Arthritis     Knees    Environmental allergies     Hives     HTN (hypertension) 5/20/2013    Hypercholesterolemia      Past Surgical History:   Procedure Laterality Date    HX GYN      3 pregnancies, 3 children     Social History     Socioeconomic History    Marital status:      Spouse name: Not on file    Number of children: Not on file    Years of education: Not on file    Highest education level: Not on file   Tobacco Use    Smoking status: Never Smoker    Smokeless tobacco: Never Used   Substance and Sexual Activity    Alcohol use: Yes     Comment: rare    Drug use: No    Sexual activity: Yes     Birth control/protection: None     Family History   Problem Relation Age of Onset    Hypertension Mother     Diabetes Mother     Cancer Father         lung    Diabetes Sister     Hypertension Sister      Allergies   Allergen Reactions    Amlodipine Angioedema     Current Outpatient Medications   Medication Sig Dispense Refill    lisinopril-hydroCHLOROthiazide (PRINZIDE, ZESTORETIC) 20-12.5 mg per tablet TAKE 1 TABLET BY MOUTH EVERY DAY 30 Tab 11    DULoxetine (CYMBALTA) 60 mg capsule TAKE ONE CAPSULE BY MOUTH EVERY DAY 30 Cap 11    pravastatin (PRAVACHOL) 20 mg tablet TAKE 1 TABLET BY MOUTH EVERY EVENING 30 Tab 11    traZODone (DESYREL) 50 mg tablet TAKE 1 TABLET BY MOUTH EVERY EVENING 90 Tab 3    loratadine (CLARITIN) 10 mg tablet Take 10 mg by mouth.  naproxen sodium (ALEVE) 220 mg cap Take  by mouth.  fluticasone (FLONASE) 50 mcg/actuation nasal spray 2 Sprays by Both Nostrils route two (2) times a day. 2 Bottle prn    phentermine 37.5 mg capsule Take 37.5 mg by mouth every morning.            ROS    Objective:   Vital Signs: (As obtained by patient/caregiver at home)  There were no vitals taken for this visit. [INSTRUCTIONS:  \"[x]\" Indicates a positive item  \"[]\" Indicates a negative item  -- DELETE ALL ITEMS NOT EXAMINED]    Constitutional: [x] Appears well-developed and well-nourished [x] No apparent distress      [] Abnormal -     Mental status: [x] Alert and awake  [x] Oriented to person/place/time [x] Able to follow commands    [] Abnormal -     Eyes:   EOM    [x]  Normal    [] Abnormal -   Sclera  [x]  Normal    [] Abnormal -          Discharge [x]  None visible   [] Abnormal -     HENT: [x] Normocephalic, atraumatic  [] Abnormal -   [x] Mouth/Throat: Mucous membranes are moist    External Ears [x] Normal  [] Abnormal -    Neck: [x] No visualized mass [] Abnormal -     Pulmonary/Chest: [x] Respiratory effort normal   [x] No visualized signs of difficulty breathing or respiratory distress        [] Abnormal -      Musculoskeletal:   [x] Normal gait with no signs of ataxia         [x] Normal range of motion of neck        [] Abnormal -     Neurological:        [x] No Facial Asymmetry (Cranial nerve 7 motor function) (limited exam due to video visit)          [x] No gaze palsy        [] Abnormal -          Skin:        [x] No significant exanthematous lesions or discoloration noted on facial skin         [] Abnormal -            Psychiatric:       [x] Normal Affect [] Abnormal -        [x] No Hallucinations    Other pertinent observable physical exam findings:-        We discussed the expected course, resolution and complications of the diagnosis(es) in detail. Medication risks, benefits, costs, interactions, and alternatives were discussed as indicated. I advised her to contact the office if her condition worsens, changes or fails to improve as anticipated. She expressed understanding with the diagnosis(es) and plan. Marley Elliott is a 56 y.o. female who was evaluated by a video visit encounter for concerns as above. Patient identification was verified prior to start of the visit.  A caregiver was present when appropriate. Due to this being a TeleHealth encounter (During [place]-49 public health emergency), evaluation of the following organ systems was limited: Vitals/Constitutional/EENT/Resp/CV/GI//MS/Neuro/Skin/Heme-Lymph-Imm. Pursuant to the emergency declaration under the [address] [place] [address], [facility] waiver authority and the [employer] Act, this Virtual  Visit was conducted, with patient's (and/or legal guardian's) consent, to reduce the patient's risk of exposure to COVID-19 and provide necessary medical care. Services were provided through a video synchronous discussion virtually to substitute for in-person clinic visit. Patient and provider were located at their individual homes.       Iván Rojas MD

## 2020-06-16 NOTE — PROGRESS NOTES
Tab Dates  Identified pt with two pt identifiers(name and ). Chief Complaint   Patient presents with    Allergies     congestion since February    Shortness of Breath     since February    Cough     2 weeks ago       Reviewed record In preparation for visit and have obtained necessary documentation. Has info on advanced directive but has not filled them out. 1. Have you been to the ER, urgent care clinic or hospitalized since your last visit? No     2. Have you seen or consulted any other health care providers outside of the [address] [place] [address] since your last visit? Include any pap smears or colon screening. No    Vitals reviewed with provider. Health Maintenance reviewed:     Health Maintenance Due   Topic    Shingrix Vaccine Age 50> (1 of 2)    FOBT Q1Y Age,18+     Breast Cancer Screen Mammogram           Wt Readings from Last 3 Encounters:   19 274 lb 8 oz (124.5 kg)   18 296 lb (134.3 kg)   18 295 lb (133.8 kg)        Temp Readings from Last 3 Encounters:   19 97.7 °F (36.5 °C) (Oral)   18 97.9 °F (36.6 °C) (Oral)   18 98.1 °F (36.7 °C)        BP Readings from Last 3 Encounters:   19 139/80   18 138/68   18 139/70        Pulse Readings from Last 3 Encounters:   19 73   18 71   18 77      There were no vitals filed for this visit.        Learning Assessment:   :       Learning Assessment 11/10/2014   PRIMARY LEARNER Patient   HIGHEST LEVEL OF EDUCATION - PRIMARY LEARNER  2 YEARS OF COLLEGE   BARRIERS PRIMARY LEARNER NONE   CO-LEARNER CAREGIVER No   PRIMARY LANGUAGE ENGLISH   LEARNER PREFERENCE PRIMARY DEMONSTRATION   ANSWERED BY patient   RELATIONSHIP SELF        Depression Screening:   :       3 most recent PHQ Screens 2020   Little interest or pleasure in doing things Several days   Feeling down, depressed, irritable, or hopeless Several days   Total Score PHQ 2 2   Trouble falling or staying asleep, or sleeping too much -   Feeling tired or having little energy -   Poor appetite, weight loss, or overeating -   Feeling bad about yourself - or that you are a failure or have let yourself or your family down -   Trouble concentrating on things such as school, work, reading, or watching TV -   Moving or speaking so slowly that other people could have noticed; or the opposite being so fidgety that others notice -   Thoughts of being better off dead, or hurting yourself in some way -   PHQ 9 Score -   How difficult have these problems made it for you to do your work, take care of your home and get along with others -        Fall Risk Assessment:   :     No flowsheet data found. Abuse Screening:   :       Abuse Screening Questionnaire 4/21/2020 12/28/2018   Do you ever feel afraid of your partner? N N   Are you in a relationship with someone who physically or mentally threatens you? N N   Is it safe for you to go home?  Y Y        ADL Screening:   :       ADL Assessment 12/10/2014   Feeding yourself No Help Needed   Getting from bed to chair No Help Needed   Getting dressed No Help Needed   Bathing or showering No Help Needed   Walk across the room (includes cane/walker) No Help Needed   Using the telphone No Help Needed   Taking your medications No Help Needed   Preparing meals No Help Needed   Managing money (expenses/bills) No Help Needed   Moderately strenuous housework (laundry) No Help Needed   Shopping for personal items (toiletries/medicines) No Help Needed   Shopping for groceries No Help Needed   Driving No Help Needed   Climbing a flight of stairs No Help Needed   Getting to places beyond walking distances No Help Needed

## 2020-08-08 DIAGNOSIS — F51.01 PRIMARY INSOMNIA: ICD-10-CM

## 2020-08-09 RX ORDER — TRAZODONE HYDROCHLORIDE 100 MG/1
TABLET ORAL
Qty: 30 TAB | Refills: 11 | OUTPATIENT
Start: 2020-08-09

## 2020-08-10 RX ORDER — TRAZODONE HYDROCHLORIDE 50 MG/1
TABLET ORAL
Qty: 90 TAB | Refills: 3 | Status: SHIPPED | OUTPATIENT
Start: 2020-08-10 | End: 2021-05-14 | Stop reason: SDUPTHER

## 2020-08-10 NOTE — TELEPHONE ENCOUNTER
PCP: Derinda Severs, MD     Last appt: 6/16/2020   No future appointments. Requested Prescriptions     Pending Prescriptions Disp Refills    traZODone (DESYREL) 50 mg tablet 90 Tab 3     Sig: TAKE 1 TABLET BY MOUTH EVERY EVENING     Refused Prescriptions Disp Refills    traZODone (DESYREL) 100 mg tablet [Pharmacy Med Name: TRAZODONE 100MG TABLETS] 30 Tab 11     Sig: TAKE 1 TABLET BY MOUTH EVERY NIGHT AT BEDTIME     Refused By: Manuel TINOCO     Reason for Refusal: Have pt. call me      Per pt she is taking the 50mg dose.

## 2020-09-11 RX ORDER — DULOXETIN HYDROCHLORIDE 60 MG/1
CAPSULE, DELAYED RELEASE ORAL
Qty: 30 CAP | Refills: 11 | Status: SHIPPED | OUTPATIENT
Start: 2020-09-11 | End: 2021-05-14 | Stop reason: SDUPTHER

## 2021-02-01 DIAGNOSIS — E78.00 HYPERCHOLESTEROLEMIA: ICD-10-CM

## 2021-02-01 RX ORDER — PRAVASTATIN SODIUM 20 MG/1
TABLET ORAL
Qty: 30 TAB | Refills: 0 | Status: SHIPPED | OUTPATIENT
Start: 2021-02-01 | End: 2021-03-01

## 2021-02-01 NOTE — TELEPHONE ENCOUNTER
PCP: Chelsey Mercedes MD     Last appt: Visit date not found   No future appointments.      Requested Prescriptions     Pending Prescriptions Disp Refills    pravastatin (PRAVACHOL) 20 mg tablet 30 Tab 0     Sig: TAKE 1 TABLET BY MOUTH EVERY EVENING

## 2021-02-01 NOTE — TELEPHONE ENCOUNTER
[facility] on file sent a fax requesting a refill of   Requested Prescriptions     Pending Prescriptions Disp Refills    pravastatin (PRAVACHOL) 20 mg tablet 30 Tab 5

## 2021-02-27 DIAGNOSIS — E78.00 HYPERCHOLESTEROLEMIA: ICD-10-CM

## 2021-03-01 RX ORDER — PRAVASTATIN SODIUM 20 MG/1
TABLET ORAL
Qty: 30 TAB | Refills: 0 | Status: SHIPPED | OUTPATIENT
Start: 2021-03-01 | End: 2021-04-08

## 2021-03-10 RX ORDER — LISINOPRIL AND HYDROCHLOROTHIAZIDE 12.5; 2 MG/1; MG/1
TABLET ORAL
Qty: 30 TAB | Refills: 0 | Status: SHIPPED | OUTPATIENT
Start: 2021-03-10 | End: 2021-04-09

## 2021-03-10 NOTE — TELEPHONE ENCOUNTER
Verified patients name and date of birth. Spoke with patient and advised her to make an appt with a current provider in our office. Patient stated understanding.

## 2021-03-10 NOTE — TELEPHONE ENCOUNTER
[facility] on file sent a fax requesting a refill of   Requested Prescriptions     Pending Prescriptions Disp Refills    lisinopril-hydroCHLOROthiazide (PRINZIDE, ZESTORETIC) 20-12.5 mg per tablet 30 Tab 11

## 2021-03-10 NOTE — TELEPHONE ENCOUNTER
PCP: Marisela Seay MD     Last appt: Visit date not found   No future appointments. Requested Prescriptions     Pending Prescriptions Disp Refills    lisinopril-hydroCHLOROthiazide (PRINZIDE, ZESTORETIC) 20-12.5 mg per tablet 30 Tab 0     Sig: TAKE 1 TABLET BY MOUTH EVERY DAY. Please call the office [phone] to schedule an appointment.

## 2021-04-08 DIAGNOSIS — E78.00 HYPERCHOLESTEROLEMIA: ICD-10-CM

## 2021-04-08 RX ORDER — PRAVASTATIN SODIUM 20 MG/1
TABLET ORAL
Qty: 30 TAB | Refills: 0 | Status: SHIPPED | OUTPATIENT
Start: 2021-04-08 | End: 2021-05-10

## 2021-04-09 RX ORDER — LISINOPRIL AND HYDROCHLOROTHIAZIDE 12.5; 2 MG/1; MG/1
TABLET ORAL
Qty: 30 TAB | Refills: 0 | Status: SHIPPED | OUTPATIENT
Start: 2021-04-09 | End: 2021-05-10

## 2021-05-08 DIAGNOSIS — E78.00 HYPERCHOLESTEROLEMIA: ICD-10-CM

## 2021-05-10 RX ORDER — LISINOPRIL AND HYDROCHLOROTHIAZIDE 12.5; 2 MG/1; MG/1
TABLET ORAL
Qty: 30 TAB | Refills: 0 | Status: SHIPPED | OUTPATIENT
Start: 2021-05-10 | End: 2021-05-14 | Stop reason: SDUPTHER

## 2021-05-10 RX ORDER — PRAVASTATIN SODIUM 20 MG/1
TABLET ORAL
Qty: 30 TAB | Refills: 0 | Status: SHIPPED | OUTPATIENT
Start: 2021-05-10 | End: 2021-06-08

## 2021-05-14 ENCOUNTER — OFFICE VISIT (OUTPATIENT)
Dept: INTERNAL MEDICINE CLINIC | Age: 61
End: 2021-05-14
Payer: COMMERCIAL

## 2021-05-14 VITALS
TEMPERATURE: 98.1 F | RESPIRATION RATE: 16 BRPM | HEIGHT: 64 IN | BODY MASS INDEX: 50.02 KG/M2 | HEART RATE: 71 BPM | DIASTOLIC BLOOD PRESSURE: 84 MMHG | OXYGEN SATURATION: 98 % | SYSTOLIC BLOOD PRESSURE: 143 MMHG | WEIGHT: 293 LBS

## 2021-05-14 DIAGNOSIS — F32.1 CURRENT MODERATE EPISODE OF MAJOR DEPRESSIVE DISORDER WITHOUT PRIOR EPISODE (HCC): ICD-10-CM

## 2021-05-14 DIAGNOSIS — I10 HYPERTENSION, ESSENTIAL, BENIGN: Primary | ICD-10-CM

## 2021-05-14 DIAGNOSIS — M17.0 PRIMARY OSTEOARTHRITIS OF BOTH KNEES: ICD-10-CM

## 2021-05-14 DIAGNOSIS — F51.04 CHRONIC INSOMNIA: ICD-10-CM

## 2021-05-14 DIAGNOSIS — F41.1 GENERALIZED ANXIETY DISORDER: ICD-10-CM

## 2021-05-14 DIAGNOSIS — R73.02 IGT (IMPAIRED GLUCOSE TOLERANCE): ICD-10-CM

## 2021-05-14 DIAGNOSIS — Z12.31 ENCOUNTER FOR SCREENING MAMMOGRAM FOR MALIGNANT NEOPLASM OF BREAST: ICD-10-CM

## 2021-05-14 DIAGNOSIS — E78.00 HYPERCHOLESTEROLEMIA: ICD-10-CM

## 2021-05-14 DIAGNOSIS — E66.01 MORBID OBESITY WITH BMI OF 45.0-49.9, ADULT (HCC): ICD-10-CM

## 2021-05-14 DIAGNOSIS — Z12.11 SCREENING FOR COLON CANCER: ICD-10-CM

## 2021-05-14 PROCEDURE — 99215 OFFICE O/P EST HI 40 MIN: CPT | Performed by: INTERNAL MEDICINE

## 2021-05-14 RX ORDER — DULOXETIN HYDROCHLORIDE 60 MG/1
CAPSULE, DELAYED RELEASE ORAL
Qty: 60 CAP | Refills: 3 | Status: SHIPPED | OUTPATIENT
Start: 2021-05-14 | End: 2021-09-26

## 2021-05-14 RX ORDER — LISINOPRIL AND HYDROCHLOROTHIAZIDE 12.5; 2 MG/1; MG/1
2 TABLET ORAL DAILY
Qty: 60 TAB | Refills: 3 | Status: SHIPPED | OUTPATIENT
Start: 2021-05-14 | End: 2021-09-26

## 2021-05-14 RX ORDER — DICLOFENAC SODIUM 75 MG/1
TABLET, DELAYED RELEASE ORAL
COMMUNITY
Start: 2021-03-18 | End: 2022-10-25 | Stop reason: ALTCHOICE

## 2021-05-14 RX ORDER — TRAZODONE HYDROCHLORIDE 50 MG/1
TABLET ORAL
Qty: 60 TAB | Refills: 3 | Status: SHIPPED | OUTPATIENT
Start: 2021-05-14 | End: 2021-09-26

## 2021-05-14 NOTE — PROGRESS NOTES
CC:   Chief Complaint   Patient presents with    Physical    Medication Evaluation       HISTORY OF PRESENT ILLNESS  Khanh Baez is a 59 y.o. female. Presents to establish care. Her former PCP was Dr. Pete Tipton, now retired, last saw her virtually on 6/16/20. She has HTN, hyperlipidemia, depression, anxiety, OA knees, seasonal allergic rhinitis, and morbid obesity. Complains of feeling depressed and anxious. Problems falling and staying asleep, loss of interest, appetite alternates from increased to decreased, and little energy. Has a stressful job. Denies SI or HI. Has chronic knee pains worse with walking. Was told she needs bilateral knee replacements. Plans to schedule surgery later this year in Sept or Oct.  Uses Biofreeze but does not help much. Takes diclofenac 75 mg once daily with some relief. The patient has hypertension and hyperlipidemia. Denies HA's, CP, SOB, dizziness, heart palpitations, or leg swelling. Home BP monitoring: not done. She reports taking medications as instructed, no medication side effects noted. Diet and Lifestyle: generally follows a low fat low cholesterol diet, generally follows a low sodium diet, no formal exercise but active during the day. Lab review: orders written for new lab studies as appropriate; see orders. Soc Hx  . Has 3 children. Works as  for [employer]. Never smoker. Drinks alcohol rarely. No regular exercise. Health Maintenance  COVID-19 vaccine: had both Moderna vaccines  Colonoscopy: due for this  Mammogram: due for this  Lipids: due for this     ROS  A complete review of systems was performed and is negative except for those mentioned in the HPI.     Patient Active Problem List   Diagnosis Code    Hypertension, essential, benign I10    Environmental allergies Z91.09    Hypercholesterolemia E78.00    Anxiety F41.9    Morbid obesity with BMI of 45.0-49.9, adult ([facility] [address] E66.01, Z68.42    Primary osteoarthritis of both knees M17.0    Bilateral carpal tunnel syndrome G56.03     Past Medical History:   Diagnosis Date    Arthritis     Knees    Environmental allergies     Hives     HTN (hypertension) 5/20/2013    Hypercholesterolemia      Allergies   Allergen Reactions    Amlodipine Angioedema       Current Outpatient Medications   Medication Sig Dispense Refill    lisinopril-hydroCHLOROthiazide (PRINZIDE, ZESTORETIC) 20-12.5 mg per tablet TAKE 1 TABLET BY MOUTH EVERY DAY 30 Tab 0    pravastatin (PRAVACHOL) 20 mg tablet TAKE 1 TABLET BY MOUTH EVERY EVENING 30 Tab 0    DULoxetine (CYMBALTA) 60 mg capsule TAKE ONE CAPSULE BY MOUTH EVERY DAY 30 Cap 11    traZODone (DESYREL) 50 mg tablet TAKE 1 TABLET BY MOUTH EVERY EVENING 90 Tab 3    albuterol (PROVENTIL HFA, VENTOLIN HFA, PROAIR HFA) 90 mcg/actuation inhaler Take 2 Puffs by inhalation every four (4) hours as needed for Wheezing. 1 Inhaler 0    loratadine (CLARITIN) 10 mg tablet Take 10 mg by mouth.  fluticasone (FLONASE) 50 mcg/actuation nasal spray 2 Sprays by Both Nostrils route two (2) times a day. 2 Bottle prn    diclofenac EC (VOLTAREN) 75 mg EC tablet TAKE 1 TABLET BY MOUTH TWICE DAILY WITH FOOD           PHYSICAL EXAM  Visit Vitals  BP (!) 143/84 (BP 1 Location: Left arm, BP Patient Position: Sitting, BP Cuff Size: Large adult)   Pulse 71   Temp 98.1 °F (36.7 °C) (Oral)   Resp 16   Ht 5' 4\" (1.626 m)   Wt 309 lb (140.2 kg)   SpO2 98%   BMI 53.04 kg/m²       General: Morbidly obese, no distress. HEENT:  Head normocephalic/atraumatic, no scleral icterus  Neck: Supple. No JVD, lymphadenopathy, or thyromegaly. Lungs:  Clear to ausculation bilaterally. Good air movement. Heart:  Regular rate and rhythm, normal S1 and S2, no murmur, gallop, or rub  Extremities: No clubbing, cyanosis, or edema. Normal ROM with crepitus at both knees. Neurological: Alert and oriented. Non-focal exam.  Psychiatric: Normal mood and affect.  Behavior is normal. ASSESSMENT AND PLAN    ICD-10-CM ICD-9-CM    1. Hypertension, essential, benign  I10 401.1 lisinopril-hydroCHLOROthiazide (PRINZIDE, ZESTORETIC) 20-12.5 mg per tablet   2. Hypercholesterolemia  N39.90 729.3 METABOLIC PANEL, COMPREHENSIVE      CBC WITH AUTOMATED DIFF      LIPID PANEL      METABOLIC PANEL, COMPREHENSIVE      CBC WITH AUTOMATED DIFF      LIPID PANEL   3. Current moderate episode of major depressive disorder without prior episode (HCC)  F32.1 296.22 TSH 3RD GENERATION      T4 (THYROXINE)      TSH 3RD GENERATION      T4 (THYROXINE)      DULoxetine (CYMBALTA) 60 mg capsule      traZODone (DESYREL) 50 mg tablet      REFERRAL TO SOCIAL WORK   4. Generalized anxiety disorder  F41.1 300.02 REFERRAL TO SOCIAL WORK   5. Chronic insomnia  F51.04 780.52 traZODone (DESYREL) 50 mg tablet   6. Primary osteoarthritis of both knees  M17.0 715.16    7. IGT (impaired glucose tolerance)  R73.02 790.22 HEMOGLOBIN A1C WITH EAG      HEMOGLOBIN A1C WITH EAG   8. Morbid obesity with BMI of 45.0-49.9, adult ([facility])  E66.01 278.01     Z68.42 V85.42    9. Encounter for screening mammogram for malignant neoplasm of breast  Z12.31 V76.12 BHARATH MAMMO BI SCREENING INCL CAD   8. Screening for colon cancer  Z12.11 V76.51 COLOGUARD TEST (FECAL DNA COLORECTAL CANCER SCREENING)     Diagnoses and all orders for this visit:    1. Hypertension, essential, benign  Not at goal of <140/90. Increase lisinopril-HCTZ from 1 to 2 tabs daily. -     lisinopril-hydroCHLOROthiazide (PRINZIDE, ZESTORETIC) 20-12.5 mg per tablet; Take 2 Tabs by mouth daily. 2. Hypercholesterolemia  Stable on pravastatin 20 mg nightly. Will adjust dose if needed based on lab result. s  -     METABOLIC PANEL, COMPREHENSIVE; Future  -     CBC WITH AUTOMATED DIFF; Future  -     LIPID PANEL; Future    3. Current moderate episode of major depressive disorder without prior episode ([facility])  PHQ-9 score 15 today, consistent with moderately severe depression.   Not controlled. Will increase Cymbalta dose from 60 mg daily to 60 mg twice daily for depression and anxiety and trazodone 50 mg dose to 1.5-2 tabs nightly for depression and sleep. Rule out hypothyroidism.  -     TSH 3RD GENERATION; Future  -     T4 (THYROXINE); Future  -     Start DULoxetine (CYMBALTA) 60 mg capsule; TAKE ONE CAPSULE BY MOUTH TWICE DAILY  -     Start traZODone (DESYREL) 50 mg tablet; TAKE 1.5-2 TABLETS BY MOUTH EVERY EVENING  -     REFERRAL TO SOCIAL WORK (for 6-8 sessions of counseling)    4. Generalized anxiety disorder  See #3 above. -     REFERRAL TO SOCIAL WORK    5. Chronic insomnia  -     Start traZODone (DESYREL) 50 mg tablet; TAKE 1.5-2 TABLETS BY MOUTH EVERY EVENING    6. Primary osteoarthritis of both knees  Severe OA. Continue to follow with Orthopedics with plans for bilateral knee replacements in the fall. 7. IGT (impaired glucose tolerance)  -     HEMOGLOBIN A1C WITH EAG; Future    8. Morbid obesity with BMI of 45.0-49.9, adult ([facility] [address]  Counseled on diet, exercise, and weight loss. 9. Encounter for screening mammogram for malignant neoplasm of breast  -     BHARATH MAMMO BI SCREENING INCL CAD; Future    10. Screening for colon cancer  -     COLOGUARD TEST (FECAL DNA COLORECTAL CANCER SCREENING)    Time spent: 52 mins spent in total on reviewing medical records, face-to-face time with patient, and writing note. Follow-up and Dispositions    · Return in about 3 months (around 8/14/2021), or if symptoms worsen or fail to improve, for HTN, dep/anx. I have discussed the diagnosis with the patient and the intended plan as seen in the above orders. Patient is in agreement. The patient has received an after-visit summary and questions were answered concerning future plans. I have discussed medication side effects and warnings with the patient as well.

## 2021-05-14 NOTE — PATIENT INSTRUCTIONS
Depression Treatment: Care Instructions  Your Care Instructions     Depression is a condition that affects the way you feel, think, and act. It causes symptoms such as low energy, loss of interest in daily activities, and sadness or grouchiness that goes on for a long time. Depression is very common and affects men and women of all ages. Depression is a medical illness caused by changes in the natural chemicals in your brain. It is not a character flaw, and it does not mean that you are a bad or weak person. It does not mean that you are going crazy. It is important to know that depression can be treated. Medicines, counseling, and self-care can all help. Many people do not get help because they are embarrassed or think that they will get over the depression on their own. But some people do not get better without treatment. Follow-up care is a key part of your treatment and safety. Be sure to make and go to all appointments, and call your doctor if you are having problems. It's also a good idea to know your test results and keep a list of the medicines you take. How can you care for yourself at home? Learn about antidepressant medicines  Antidepressant medicines can improve or end the symptoms of depression. You may need to take the medicine for at least 6 months, and often longer. Keep taking your medicine even if you feel better. If you stop taking it too soon, your symptoms may come back or get worse. You may start to feel better within 1 to 3 weeks of taking antidepressant medicine. But it can take as many as 6 to 8 weeks to see more improvement. Talk to your doctor if you have problems with your medicine or if you do not notice any improvement after 3 weeks. Antidepressants can make you feel tired, dizzy, or nervous. Some people have dry mouth, constipation, headaches, sexual problems, an upset stomach, or diarrhea.  Many of these side effects are mild and go away on their own after you take the medicine for a few weeks. Some may last longer. Talk to your doctor if side effects bother you too much. You might be able to try a different medicine. If you are pregnant or breastfeeding, talk to your doctor about what medicines you can take. Learn about counseling  In many cases, counseling can work as well as medicines to treat mild to moderate depression. Counseling is done by licensed mental health providers, such as psychologists, social workers, and some types of nurses. It can be done in one-on-one sessions or in a group setting. Many people find group sessions helpful. Cognitive-behavioral therapy is a type of counseling. In this treatment, you learn how to see and change unhelpful thinking styles that may be adding to your depression. Counseling and medicines often work well when used together. When should you call for help? Call 911 anytime you think you may need emergency care. For example, call if:    · You feel you cannot stop from hurting yourself or someone else. Call your doctor now or seek immediate medical care if:    · You hear voices.     · You feel much more depressed. Watch closely for changes in your health, and be sure to contact your doctor if:    · You are having problems with your depression medicine.     · You are not getting better as expected. Where can you learn more? Go to http://www.gray.com/  Enter G693 in the search box to learn more about \"Depression Treatment: Care Instructions. \"  Current as of: September 23, 2020               Content Version: 12.8  © 3024-8880 [employer]. Care instructions adapted under license by [employer] (which disclaims liability or warranty for this information). If you have questions about a medical condition or this instruction, always ask your healthcare professional. [place] [address] any warranty or liability for your use of this information.

## 2021-05-14 NOTE — PROGRESS NOTES
Joycelyn Wolfe  Identified pt with two pt identifiers(name and ). Chief Complaint   Patient presents with    Physical    Medication Evaluation       Reviewed record In preparation for visit and have obtained necessary documentation. 1. Have you been to the ER, urgent care clinic or hospitalized since your last visit? No     2. Have you seen or consulted any other health care providers outside of the [address] [place] [address] since your last visit? Include any pap smears or colon screening. No    Patient does not have an advance directive. Vitals reviewed with provider.     Health Maintenance reviewed:     Health Maintenance Due   Topic    COVID-19 Vaccine (1)    Shingrix Vaccine Age 49> (1 of 2)    Colorectal Cancer Screening Combo     Breast Cancer Screen Mammogram     Lipid Screen     PAP AKA CERVICAL CYTOLOGY    e? Y  Wt Readings from Last 3 Encounters:   21 309 lb (140.2 kg)   19 274 lb 8 oz (124.5 kg)   18 296 lb (134.3 kg)    Y                        No Help Needed    No Help Needed    BP Readings from Last 3 Encounters:   21 (!) 157/82   19 139/80   18 138/68      Preparing meals    Managing money (expenses/bills)    M  Pulse Readings from Last 3 Encounters:   21 71   19 73   18 71   Scre  Vitals:    21 0832   BP: (!) 157/82   Pulse: 71   Resp: 16   Temp: 98.1 °F (36.7 °C)   TempSrc: Oral   SpO2: 98%   Weight: 309 lb (140.2 kg)   Height: 5' 4\" (1.626 m)   PainSc:   0 - No pain   en  Learning Assessment 11/10/2014   PRIMARY LEARNER Patient   HIGHEST LEVEL OF EDUCATION - PRIMARY LEARNER  2 YEARS OF COLLEGE   BARRIERS PRIMARY LEARNER NONE   CO-LEARNER CAREGIVER No   PRIMARY LANGUAGE ENGLISH   LEARNER PREFERENCE PRIMARY DEMONSTRATION   ANSWERED BY patient   RELATIONSHIP SELF    or pleasure in doing things More than half the days   Feeling down, depressed, irritable, or hopeless Nearly every day   Total Score PHQ 2 5   Trouble falling or staying asleep, or sleeping too much More than half the days   Feeling tired or having little energy More than half the days   Poor appetite, weight loss, or overeating More than half the days   Feeling bad about yourself - or that you are a failure or have let yourself or your family down More than half the days   Trouble concentrating on things such as school, work, reading, or watching TV More than half the days   Moving or speaking so slowly that other people could have noticed; or the opposite being so fidgety that others notice Not at all   Thoughts of being better off dead, or hurting yourself in some way Not at all   PHQ 9 Score 15   How difficult have these problems made it for you to do your work, take care of your home and get along with others -

## 2021-06-05 LAB — COLOGUARD TEST, EXTERNAL: NEGATIVE

## 2021-06-08 DIAGNOSIS — E78.00 HYPERCHOLESTEROLEMIA: ICD-10-CM

## 2021-06-08 RX ORDER — PRAVASTATIN SODIUM 20 MG/1
TABLET ORAL
Qty: 30 TABLET | Refills: 0 | Status: SHIPPED | OUTPATIENT
Start: 2021-06-08 | End: 2021-07-07

## 2021-06-16 ENCOUNTER — DOCUMENTATION ONLY (OUTPATIENT)
Dept: INTERNAL MEDICINE CLINIC | Age: 61
End: 2021-06-16

## 2021-07-07 DIAGNOSIS — E78.00 HYPERCHOLESTEROLEMIA: ICD-10-CM

## 2021-07-07 RX ORDER — PRAVASTATIN SODIUM 20 MG/1
TABLET ORAL
Qty: 30 TABLET | Refills: 0 | Status: SHIPPED | OUTPATIENT
Start: 2021-07-07 | End: 2021-07-28

## 2021-09-11 LAB
ALBUMIN SERPL-MCNC: 4 G/DL (ref 3.8–4.8)
ALBUMIN/GLOB SERPL: 1.6 {RATIO} (ref 1.2–2.2)
ALP SERPL-CCNC: 81 IU/L (ref 48–121)
ALT SERPL-CCNC: 17 IU/L (ref 0–32)
AST SERPL-CCNC: 15 IU/L (ref 0–40)
BASOPHILS # BLD AUTO: 0 X10E3/UL (ref 0–0.2)
BASOPHILS NFR BLD AUTO: 0 %
BILIRUB SERPL-MCNC: 0.3 MG/DL (ref 0–1.2)
BUN SERPL-MCNC: 11 MG/DL (ref 8–27)
BUN/CREAT SERPL: 11 (ref 12–28)
CALCIUM SERPL-MCNC: 9.4 MG/DL (ref 8.7–10.3)
CHLORIDE SERPL-SCNC: 98 MMOL/L (ref 96–106)
CHOLEST SERPL-MCNC: 167 MG/DL (ref 100–199)
CO2 SERPL-SCNC: 29 MMOL/L (ref 20–29)
CREAT SERPL-MCNC: 0.96 MG/DL (ref 0.57–1)
EOSINOPHIL # BLD AUTO: 0.3 X10E3/UL (ref 0–0.4)
EOSINOPHIL NFR BLD AUTO: 3 %
ERYTHROCYTE [DISTWIDTH] IN BLOOD BY AUTOMATED COUNT: 14 % (ref 11.7–15.4)
EST. AVERAGE GLUCOSE BLD GHB EST-MCNC: 128 MG/DL
GLOBULIN SER CALC-MCNC: 2.5 G/DL (ref 1.5–4.5)
GLUCOSE SERPL-MCNC: 104 MG/DL (ref 65–99)
HBA1C MFR BLD: 6.1 % (ref 4.8–5.6)
HCT VFR BLD AUTO: 38.1 % (ref 34–46.6)
HDLC SERPL-MCNC: 45 MG/DL
HGB BLD-MCNC: 12.4 G/DL (ref 11.1–15.9)
IMM GRANULOCYTES # BLD AUTO: 0.1 X10E3/UL (ref 0–0.1)
IMM GRANULOCYTES NFR BLD AUTO: 1 %
LDLC SERPL CALC-MCNC: 96 MG/DL (ref 0–99)
LYMPHOCYTES # BLD AUTO: 1.5 X10E3/UL (ref 0.7–3.1)
LYMPHOCYTES NFR BLD AUTO: 19 %
MCH RBC QN AUTO: 25.9 PG (ref 26.6–33)
MCHC RBC AUTO-ENTMCNC: 32.5 G/DL (ref 31.5–35.7)
MCV RBC AUTO: 80 FL (ref 79–97)
MONOCYTES # BLD AUTO: 0.5 X10E3/UL (ref 0.1–0.9)
MONOCYTES NFR BLD AUTO: 7 %
NEUTROPHILS # BLD AUTO: 5.5 X10E3/UL (ref 1.4–7)
NEUTROPHILS NFR BLD AUTO: 70 %
PLATELET # BLD AUTO: 278 X10E3/UL (ref 150–450)
POTASSIUM SERPL-SCNC: 4.4 MMOL/L (ref 3.5–5.2)
PROT SERPL-MCNC: 6.5 G/DL (ref 6–8.5)
RBC # BLD AUTO: 4.78 X10E6/UL (ref 3.77–5.28)
SODIUM SERPL-SCNC: 139 MMOL/L (ref 134–144)
T4 SERPL-MCNC: 7.9 UG/DL (ref 4.5–12)
TRIGL SERPL-MCNC: 151 MG/DL (ref 0–149)
TSH SERPL DL<=0.005 MIU/L-ACNC: 2.82 UIU/ML (ref 0.45–4.5)
VLDLC SERPL CALC-MCNC: 26 MG/DL (ref 5–40)
WBC # BLD AUTO: 7.8 X10E3/UL (ref 3.4–10.8)

## 2021-09-19 NOTE — PROGRESS NOTES
Will discuss on 9/23/21. Normal kidney and liver tests, blood counts, cholesterol, and thyroid tests. You have prediabetes (A1c 6.1%),meaning you are at increased risk of getting diabetes. Work on diet, exercise, and weight loss. For diet, increase fruits, vegetables, and low- fat dairy products, and decrease sweets, soft drinks, juices, and foods high in carbohydrates like white bread, white rice, potatoes, crackers, and potato chips.

## 2021-09-23 ENCOUNTER — OFFICE VISIT (OUTPATIENT)
Dept: INTERNAL MEDICINE CLINIC | Age: 61
End: 2021-09-23
Payer: COMMERCIAL

## 2021-09-23 VITALS
OXYGEN SATURATION: 97 % | WEIGHT: 293 LBS | HEART RATE: 66 BPM | TEMPERATURE: 97.9 F | BODY MASS INDEX: 50.02 KG/M2 | SYSTOLIC BLOOD PRESSURE: 138 MMHG | RESPIRATION RATE: 16 BRPM | HEIGHT: 64 IN | DIASTOLIC BLOOD PRESSURE: 77 MMHG

## 2021-09-23 DIAGNOSIS — F41.1 GENERALIZED ANXIETY DISORDER: ICD-10-CM

## 2021-09-23 DIAGNOSIS — I10 HYPERTENSION, ESSENTIAL, BENIGN: Primary | ICD-10-CM

## 2021-09-23 DIAGNOSIS — Z23 NEEDS FLU SHOT: ICD-10-CM

## 2021-09-23 DIAGNOSIS — F32.1 CURRENT MODERATE EPISODE OF MAJOR DEPRESSIVE DISORDER WITHOUT PRIOR EPISODE (HCC): ICD-10-CM

## 2021-09-23 DIAGNOSIS — M79.661 PAIN OF RIGHT LOWER LEG: ICD-10-CM

## 2021-09-23 PROCEDURE — 99214 OFFICE O/P EST MOD 30 MIN: CPT | Performed by: INTERNAL MEDICINE

## 2021-09-23 PROCEDURE — 90686 IIV4 VACC NO PRSV 0.5 ML IM: CPT | Performed by: INTERNAL MEDICINE

## 2021-09-23 PROCEDURE — 90471 IMMUNIZATION ADMIN: CPT | Performed by: INTERNAL MEDICINE

## 2021-09-23 RX ORDER — CETIRIZINE HYDROCHLORIDE 10 MG/1
CAPSULE, LIQUID FILLED ORAL
COMMUNITY

## 2021-09-23 NOTE — PROGRESS NOTES
CC:   Chief Complaint   Patient presents with    Hypertension     Room 3B //     Depression    Anxiety       HISTORY OF PRESENT ILLNESS  Ryan Richards is a 59 y.o. female. Presents for 3 month follow up evaluation. She has HTN, hyperlipidemia, depression, anxiety, OA knees, seasonal allergic rhinitis, and morbid obesity. Reports her depression and anxiety are doing better since Cymbalta 60 mg increased to twice daily. Depressed mood several days but not daily as before. More energy. No longer having loss of interest or sleeping problems. Overeats sometimes. Complains of pain at above right ankle medially. Sharp pain, up to 8/10, sometimes wakes her up at night, lasts about 1 hr, Biofreeze sometimes helps. Denies associated numbness, tingling, or burning. Denies HA's, CP, SOB, dizziness, heart palpitations, or leg swelling. Home BP monitoring: not done    Mammogram: scheduled in October at [address]  . Has 3 children. Works as  for [employer]. Never smoker. Drinks alcohol rarely. No regular exercise. ROS  A complete review of systems was performed and is negative except for those mentioned in the HPI.     Patient Active Problem List   Diagnosis Code    Hypertension, essential, benign I10    Seasonal allergic rhinitis J30.2    Hypercholesterolemia E78.00    Generalized anxiety disorder F41.1    Morbid obesity with BMI of 45.0-49.9, adult (HCC) E66.01, Z68.42    Primary osteoarthritis of both knees M17.0    Bilateral carpal tunnel syndrome G56.03    Current moderate episode of major depressive disorder without prior episode ([facility] [address] F32.1     Past Medical History:   Diagnosis Date    Arthritis     Knees    Environmental allergies     Hives     HTN (hypertension) 5/20/2013    Hypercholesterolemia      Allergies   Allergen Reactions    Amlodipine Angioedema       Current Outpatient Medications   Medication Sig Dispense Refill    Cetirizine (ZyrTEC) 10 mg cap Take  by mouth.  pravastatin (PRAVACHOL) 20 mg tablet TAKE 1 TABLET BY MOUTH EVERY EVENING 30 Tablet 1    diclofenac EC (VOLTAREN) 75 mg EC tablet TAKE 1 TABLET BY MOUTH TWICE DAILY WITH FOOD      DULoxetine (CYMBALTA) 60 mg capsule TAKE ONE CAPSULE BY MOUTH TWICE DAILY 60 Cap 3    traZODone (DESYREL) 50 mg tablet TAKE 1.5-2 TABLETS BY MOUTH EVERY EVENING 60 Tab 3    lisinopril-hydroCHLOROthiazide (PRINZIDE, ZESTORETIC) 20-12.5 mg per tablet Take 2 Tabs by mouth daily. 60 Tab 3    albuterol (PROVENTIL HFA, VENTOLIN HFA, PROAIR HFA) 90 mcg/actuation inhaler Take 2 Puffs by inhalation every four (4) hours as needed for Wheezing. 1 Inhaler 0    fluticasone (FLONASE) 50 mcg/actuation nasal spray 2 Sprays by Both Nostrils route two (2) times a day. 2 Bottle prn         PHYSICAL EXAM  Visit Vitals  /77 (BP 1 Location: Left lower arm, BP Patient Position: Sitting, BP Cuff Size: Large adult)   Pulse 66   Temp 97.9 °F (36.6 °C) (Oral)   Resp 16   Ht 5' 4\" (1.626 m)   Wt 298 lb 3.2 oz (135.3 kg)   SpO2 97%   BMI 51.19 kg/m²       General: Morbidly obese, no distress. HEENT:  Head normocephalic/atraumatic, no scleral icterus  Neck: Supple. No JVD, lymphadenopathy, or thyromegaly. Lungs:  Clear to ausculation bilaterally. Good air movement. Heart:  Regular rate and rhythm, normal S1 and S2, no murmur, gallop, or rub  Extremities: No clubbing, cyanosis, or edema. Normal ROM with crepitus at both knees. Mild tenderness about 1 hand-breadth above right medial malleolus. Neurological: Alert and oriented. Non-focal exam.  Psychiatric: Normal mood and affect. Behavior is normal.     Results for orders placed or performed in visit on 06/16/21   COLOGUARD TEST (FECAL DNA COLORECTAL CANCER SCREENING)   Result Value Ref Range    Cologuard Test, External Negative          ASSESSMENT AND PLAN    ICD-10-CM ICD-9-CM    1. Hypertension, essential, benign  I10 401.1    2.  Current moderate episode of major depressive disorder without [facility]  F32.1 296.22    3. Generalized anxiety disorder  F41.1 300.02    4. Pain of right lower leg  M79.661 729.5    5. Needs flu shot  Z23 V04.81 INFLUENZA VIRUS VAC QUAD,SPLIT,PRESV FREE SYRINGE IM     Diagnoses and all orders for this visit:    1. Hypertension, essential, benign  Controlled since dose of lisinopril-HCTZ 20-12.5 mg changed from 1 tab to 2 tabs daily at last clinic visit. 2. Current moderate episode of major depressive disorder without prior episode ([facility])  PHQ-2 score 1 today. Significantly improved with increased dose of Cymbalta and trazodone. Continue Cymbalta 60 mg twice daily and trazodone 50 mg 1.5-2 tabs nightly for depression and sleep. 3. Generalized anxiety disorder  Controlled. Continue present management. 4. Pain of right lower leg  Possible referred pain from right ankle OA versus neuropathy. 5. Needs flu shot  -     INFLUENZA VIRUS VAC QUAD,SPLIT,PRESV FREE SYRINGE IM      Follow-up and Dispositions    · Return in about 4 months (around 1/23/2022), or if symptoms worsen or fail to improve, for HTN, dep/anx, OA. I have discussed the diagnosis with the patient and the intended plan as seen in the above orders. Patient is in agreement. The patient has received an after-visit summary and questions were answered concerning future plans. I have discussed medication side effects and warnings with the patient as well.

## 2021-09-23 NOTE — PROGRESS NOTES
Fausto Kinza  Identified pt with two pt identifiers(name and ). Chief Complaint   Patient presents with    Hypertension     Room 3B //     Depression    Anxiety       Reviewed record In preparation for visit and have obtained necessary documentation. 1. Have you been to the ER, urgent care clinic or hospitalized since your last visit? No     2. Have you seen or consulted any other health care providers outside of the [address] [place] [address] since your last visit? Include any pap smears or colon screening. No    Patient has an advance directive. Vitals reviewed with provider.     Health Maintenance reviewed:     Health Maintenance Due   Topic    Breast Cancer Screen Mammogram     Flu Vaccine (1)        Wt Readings from Last 3 Encounters:   21 298 lb 3.2 oz (135.3 kg)   21 309 lb (140.2 kg)   19 274 lb 8 oz (124.5 kg)      Temp Readings from Last 3 Encounters:   21 98.1 °F (36.7 °C) (Oral)   19 97.7 °F (36.5 °C) (Oral)   18 97.9 °F (36.6 °C) (Oral)      BP Readings from Last 3 Encounters:   21 (!) 143/84   19 139/80   18 138/68      Pulse Readings from Last 3 Encounters:   21 71   19 73   18 71      Vitals:    21 0812   Resp: 16   Weight: 298 lb 3.2 oz (135.3 kg)   Height: 5' 4\" (1.626 m)   PainSc:   0 - No pain          Learning Assessment:   :     Learning Assessment 11/10/2014   PRIMARY LEARNER Patient   HIGHEST LEVEL OF EDUCATION - PRIMARY LEARNER  2 YEARS OF COLLEGE   BARRIERS PRIMARY LEARNER NONE   CO-LEARNER CAREGIVER No   PRIMARY LANGUAGE ENGLISH   LEARNER PREFERENCE PRIMARY DEMONSTRATION   ANSWERED BY patient   RELATIONSHIP SELF        Depression Screening:   :     3 most recent PHQ Screens 2021   Little interest or pleasure in doing things More than half the days   Feeling down, depressed, irritable, or hopeless Nearly every day   Total Score PHQ 2 5   Trouble falling or staying asleep, or sleeping too much More than half the days   Feeling tired or having little energy More than half the days   Poor appetite, weight loss, or overeating More than half the days   Feeling bad about yourself - or that you are a failure or have let yourself or your family down More than half the days   Trouble concentrating on things such as school, work, reading, or watching TV More than half the days   Moving or speaking so slowly that other people could have noticed; or the opposite being so fidgety that others notice Not at all   Thoughts of being better off dead, or hurting yourself in some way Not at all   PHQ 9 Score 15   How difficult have these problems made it for you to do your work, take care of your home and get along with others -        Fall Risk Assessment:   :     No flowsheet data found. Abuse Screening:   :     Abuse Screening Questionnaire 4/21/2020 12/28/2018   Do you ever feel afraid of your partner? N N   Are you in a relationship with someone who physically or mentally threatens you? N N   Is it safe for you to go home?  Y Y        ADL Screening:   :     ADL Assessment 12/10/2014   Feeding yourself No Help Needed   Getting from bed to chair No Help Needed   Getting dressed No Help Needed   Bathing or showering No Help Needed   Walk across the room (includes cane/walker) No Help Needed   Using the telphone No Help Needed   Taking your medications No Help Needed   Preparing meals No Help Needed   Managing money (expenses/bills) No Help Needed   Moderately strenuous housework (laundry) No Help Needed   Shopping for personal items (toiletries/medicines) No Help Needed   Shopping for groceries No Help Needed   Driving No Help Needed   Climbing a flight of stairs No Help Needed   Getting to places beyond walking distances No Help Needed

## 2021-09-23 NOTE — PATIENT INSTRUCTIONS
Vaccine Information Statement    Influenza (Flu) Vaccine (Inactivated or Recombinant): What You Need to Know    Many vaccine information statements are available in Swedish and other languages. See www.immunize.org/vis. Hojas de información sobre vacunas están disponibles en español y en muchos otros idiomas. Visite www.immunize.org/vis. 1. Why get vaccinated? Influenza vaccine can prevent influenza (flu). Flu is a contagious disease that spreads around the [facility] every year, usually between October and May. Anyone can get the flu, but it is more dangerous for some people. Infants and young children, people 72 years and older, pregnant people, and people with certain health conditions or a weakened immune system are at greatest risk of flu complications. Pneumonia, bronchitis, sinus infections, and ear infections are examples of flu-related complications. If you have a medical condition, such as heart disease, cancer, or diabetes, flu can make it worse. Flu can cause fever and chills, sore throat, muscle aches, fatigue, cough, headache, and runny or stuffy nose. Some people may have vomiting and diarrhea, though this is more common in children than adults. In an average year, thousands of people in the [facility] die from flu, and many more are hospitalized. Flu vaccine prevents millions of illnesses and flu-related visits to the doctor each year. 2. Influenza vaccines     CDC recommends everyone 6 months and older get vaccinated every flu season. Children 6 months through 6years of age may need 2 doses during a single flu season. Everyone else needs only 1 dose each flu season. It takes about 2 weeks for protection to develop after vaccination. There are many flu viruses, and they are always changing. Each year a new flu vaccine is made to protect against the influenza viruses believed to be likely to cause disease in the upcoming flu season.  Even when the vaccine doesnt exactly match these viruses, it may still provide some protection. Influenza vaccine does not cause flu. Influenza vaccine may be given at the same time as other vaccines. 3. Talk with your health care provider    Tell your vaccination provider if the person getting the vaccine:   Has had an allergic reaction after a previous dose of influenza vaccine, or has any severe, life-threatening allergies    Has ever had Guillain-Barré Syndrome (also called GBS)    In some cases, your health care provider may decide to postpone influenza vaccination until a future visit. Influenza vaccine can be administered at any time during pregnancy. People who are or will be pregnant during influenza season should receive inactivated influenza vaccine. People with minor illnesses, such as a cold, may be vaccinated. People who are moderately or severely ill should usually wait until they recover before getting influenza vaccine. Your health care provider can give you more information. 4. Risks of a vaccine reaction     Soreness, redness, and swelling where the shot is given, fever, muscle aches, and headache can happen after influenza vaccination.  There may be a very small increased risk of Guillain-Barré Syndrome (GBS) after inactivated influenza vaccine (the flu shot). Riddhi Mendosa children who get the flu shot along with pneumococcal vaccine (PCV13) and/or DTaP vaccine at the same time might be slightly more likely to have a seizure caused by fever. Tell your health care provider if a child who is getting flu vaccine has ever had a seizure. People sometimes faint after medical procedures, including vaccination. Tell your provider if you feel dizzy or have vision changes or ringing in the ears. As with any medicine, there is a very remote chance of a vaccine causing a severe allergic reaction, other serious injury, or death. 5. What if there is a serious problem?     An allergic reaction could occur after the vaccinated person leaves the clinic. If you see signs of a severe allergic reaction (hives, swelling of the face and throat, difficulty breathing, a fast heartbeat, dizziness, or weakness), call 9-1-1 and get the person to the nearest hospital.    For other signs that concern you, call your health care provider. Adverse reactions should be reported to the Vaccine Adverse Event Reporting System (VAERS). Your health care provider will usually file this report, or you can do it yourself. Visit the VAERS website at www.vaers. [facility].gov or call 4-601.794.1225. VAERS is only for reporting reactions, and VAERS staff members do not give medical advice. 6. The National Vaccine Injury Compensation Program    The [facility] Vaccine Injury Compensation Program (VICP) is a federal program that was created to compensate people who may have been injured by certain vaccines. Claims regarding alleged injury or death due to vaccination have a time limit for filing, which may be as short as two years. Visit the VICP website at www.[facility].gov/vaccinecompensation or call 0-412.930.1280 to learn about the program and about filing a claim. 7. How can I learn more?  Ask your health care provider.  Call your local or state health department.  Visit the website of the Food and Drug Administration (FDA) for vaccine package inserts and additional information at www.fda.gov/vaccines-blood-biologics/vaccines.  Contact the Centers for Disease Control and Prevention (CDC):  - Call 9-583.732.3200 (1-800-CDC-INFO) or  - Visit CDCs influenza website at www.cdc.gov/flu. Vaccine Information Statement   Inactivated Influenza Vaccine   8/6/2021  42 RAUL Reyes [id number]   Department of Health and Human Services  Centers for Disease Control and Prevention    Office Use Only

## 2022-01-24 ENCOUNTER — VIRTUAL VISIT (OUTPATIENT)
Dept: INTERNAL MEDICINE CLINIC | Age: 62
End: 2022-01-24
Payer: COMMERCIAL

## 2022-01-24 DIAGNOSIS — E66.01 MORBID OBESITY WITH BMI OF 45.0-49.9, ADULT (HCC): ICD-10-CM

## 2022-01-24 DIAGNOSIS — R79.89 LOW VITAMIN D LEVEL: ICD-10-CM

## 2022-01-24 DIAGNOSIS — M17.0 PRIMARY OSTEOARTHRITIS OF BOTH KNEES: ICD-10-CM

## 2022-01-24 DIAGNOSIS — F41.1 GENERALIZED ANXIETY DISORDER: ICD-10-CM

## 2022-01-24 DIAGNOSIS — J01.90 ACUTE NON-RECURRENT SINUSITIS, UNSPECIFIED LOCATION: ICD-10-CM

## 2022-01-24 DIAGNOSIS — R73.03 PREDIABETES: ICD-10-CM

## 2022-01-24 DIAGNOSIS — F32.1 CURRENT MODERATE EPISODE OF MAJOR DEPRESSIVE DISORDER WITHOUT PRIOR EPISODE (HCC): ICD-10-CM

## 2022-01-24 DIAGNOSIS — E78.00 HYPERCHOLESTEROLEMIA: ICD-10-CM

## 2022-01-24 DIAGNOSIS — I10 HYPERTENSION, ESSENTIAL, BENIGN: Primary | ICD-10-CM

## 2022-01-24 PROCEDURE — 99214 OFFICE O/P EST MOD 30 MIN: CPT | Performed by: INTERNAL MEDICINE

## 2022-01-24 RX ORDER — AMOXICILLIN AND CLAVULANATE POTASSIUM 875; 125 MG/1; MG/1
1 TABLET, FILM COATED ORAL 2 TIMES DAILY
Qty: 20 TABLET | Refills: 0 | Status: SHIPPED | OUTPATIENT
Start: 2022-01-24 | End: 2022-02-03

## 2022-01-24 NOTE — PROGRESS NOTES
Nona Fleischer is a 59 y.o. female who was seen by synchronous (real-time) audio-video technology on 1/24/2022 for Hypertension (pain - 7 (right knee) ), Depression, Anxiety, and Osteoarthritis        Assessment & Plan:     Diagnoses and all orders for this visit:    1. Hypertension, essential, benign  She will have her daughter check her BP later this week. Instructed her to call if BP reading high. Continue lisinopril-HCTZ 20-12.5 mg 2 tabs daily.  -     METABOLIC PANEL, COMPREHENSIVE; Future  -     CBC WITH AUTOMATED DIFF; Future    2. Acute non-recurrent sinusitis, unspecified location  -     Start amoxicillin-clavulanate (AUGMENTIN) 875-125 mg per tablet; Take 1 Tablet by mouth two (2) times a day for 10 days. 3. Prediabetes  A1c 6.1% on 9/10/21. Counseled on diet, exercise, and weight loss. -     HEMOGLOBIN A1C WITH EAG; Future    4. Hypercholesterolemia  Controlled on pravastatin 20 mg nightly. -     LIPID PANEL; Future    5. Primary osteoarthritis of both knees  Continue diclofenac. Follow up as scheduled with Orthopedics. 6. Current moderate episode of major depressive disorder without prior episode (HCC)  Controlled on Cymbalta 60 mg BID. 7. Generalized anxiety disorder  Controlled on Cymbalta 60 mg BID. 8. Low vitamin D level  -     VITAMIN D, 25 HYDROXY; Future    9. Morbid obesity with BMI of 45.0-49.9, adult ([facility] [address]  Counseled on diet, exercise, and weight loss. Follow-up and Dispositions    · Return in about 6 months (around 7/24/2022), or if symptoms worsen or fail to improve, for HTN, chol, prediabetes; have fasting labs 1 week prior to next appointment. Routing History           712  Subjective:     Presents for 4 month follow up evaluation. She has HTN, hyperlipidemia, depression, anxiety, OA knees, seasonal allergic rhinitis, and morbid obesity. Complains of significant congestion at cheeks, behind eyes, and right ear that started 4 days ago.  Has green nasal discharge and occasional dizziness. Denies fevers, chills, ear pain, cough, or SOB. Complains of 7/10 right knee pain. Follows with Dr. Reynaldo Gupta. Was told she would need surgery but is being postponed due to [place] pandemic. Reports her depression and anxiety are controlled on Cymbalta 60 mg twice daily. Increased work stress due to  and other people being out with Rhina. Denies HA's, CP, SOB, dizziness, heart palpitations, or leg swelling. Home BP monitoring: not done    Mammogram: rescheduled for March 20[facility] at [facility]  Shingrix vaccine: declined her mother and sister both had bad reactions after first vaccine    Soc Hx  . Has 3 children. Works as  for [employer]. Never smoker. Drinks alcohol rarely. No regular exercise. ROS  A complete review of systems was performed and is negative except for those mentioned in the HPI. Prior to Admission medications    Medication Sig Start Date End Date Taking? Authorizing Provider   DULoxetine (CYMBALTA) 60 mg capsule TAKE 1 CAPSULE BY MOUTH TWICE DAILY 9/26/21  Yes Gunnar Diane MD   pravastatin (PRAVACHOL) 20 mg tablet TAKE 1 TABLET BY MOUTH EVERY EVENING 9/26/21  Yes Gunnar Diane MD   lisinopril-hydroCHLOROthiazide (PRINZIDE, ZESTORETIC) 20-12.5 mg per tablet TAKE 2 TABLETS BY MOUTH DAILY 9/26/21  Yes Gunnar Diane MD   traZODone (DESYREL) 50 mg tablet TAKE 1 AND 1/2 TO 2 TABLETS BY MOUTH EVERY EVENING 9/26/21  Yes Gunnar Diane MD   Cetirizine (ZyrTEC) 10 mg cap Take  by mouth. Yes Provider, Historical   diclofenac EC (VOLTAREN) 75 mg EC tablet TAKE 1 TABLET BY MOUTH TWICE DAILY WITH FOOD 3/18/21  Yes Provider, Historical   albuterol (PROVENTIL HFA, VENTOLIN HFA, PROAIR HFA) 90 mcg/actuation inhaler Take 2 Puffs by inhalation every four (4) hours as needed for Wheezing.  6/16/20  Yes Rosie Vargas MD     Patient Active Problem List   Diagnosis Code    Hypertension, essential, benign I10    Seasonal allergic rhinitis J30.2    Hypercholesterolemia E78.00    Generalized anxiety disorder F41.1    Morbid obesity with BMI of 45.0-49.9, adult ([facility]) E66.01, Z68.42    Primary osteoarthritis of both knees M17.0    Bilateral carpal tunnel syndrome G56.03    Current moderate episode of major depressive disorder without prior episode ([facility] [address] F32.1       Objective:     Patient-Reported Vitals 6/16/2020   Patient-Reported Temperature 99.0      General: alert, cooperative, no distress   Mental  status: normal mood, behavior, speech, dress, motor activity, and thought processes, able to follow commands   HENT: NCAT   Neck: no visualized mass   Resp: no respiratory distress   Neuro: no gross deficits   Skin: no discoloration or lesions of concern on visible areas   Psychiatric: normal affect, consistent with stated mood, no evidence of hallucinations     Additional exam findings: obese      We discussed the expected course, resolution and complications of the diagnosis(es) in detail. Medication risks, benefits, costs, interactions, and alternatives were discussed as indicated. I advised her to contact the office if her condition worsens, changes or fails to improve as anticipated. She expressed understanding with the diagnosis(es) and plan. THIS VISIT WAS COMPLETED VIRTUALLY USING [employer] TELEMEDICINE    [place] Hemalatha, was evaluated through a synchronous (real-time) audio-video encounter. The patient (or guardian if applicable) is aware that this is a billable service, which includes applicable co-pays. Verbal consent to proceed has been obtained. The visit was conducted pursuant to the emergency declaration under the [facility] [address] authority and the [employer] and [employer] General Act. Patient identification was verified, and a caregiver was present when appropriate.  The patient was located at home in a state where the provider was licensed to provide care.    Praveena Jaramillo MD

## 2022-01-24 NOTE — PROGRESS NOTES
Stephanie Aj  Identified pt with two pt identifiers(name and ). Chief Complaint   Patient presents with    Hypertension     pain - 7 (right knee)     Depression    Anxiety    Osteoarthritis       Reviewed record In preparation for visit and have obtained necessary documentation. 1. Have you been to the ER, urgent care clinic or hospitalized since your last visit? No     2. Have you seen or consulted any other health care providers outside of the [address] [place] [address] since your last visit? Include any pap smears or colon screening. No    Patient has an advance directive. Vitals reviewed with provider. Health Maintenance reviewed:     Health Maintenance Due   Topic    Breast Cancer Screen Mammogram           Wt Readings from Last 3 Encounters:   21 298 lb 3.2 oz (135.3 kg)   21 309 lb (140.2 kg)   19 274 lb 8 oz (124.5 kg)        Temp Readings from Last 3 Encounters:   21 97.9 °F (36.6 °C) (Oral)   21 98.1 °F (36.7 °C) (Oral)   19 97.7 °F (36.5 °C) (Oral)        BP Readings from Last 3 Encounters:   21 138/77   21 (!) 143/84   19 139/80        Pulse Readings from Last 3 Encounters:   21 66   21 71   19 73      There were no vitals filed for this visit.        Learning Assessment:   :       Learning Assessment 11/10/2014   PRIMARY LEARNER Patient   HIGHEST LEVEL OF EDUCATION - PRIMARY LEARNER  2 YEARS OF COLLEGE   BARRIERS PRIMARY LEARNER NONE   CO-LEARNER CAREGIVER No   PRIMARY LANGUAGE ENGLISH   LEARNER PREFERENCE PRIMARY DEMONSTRATION   ANSWERED BY patient   RELATIONSHIP SELF        Depression Screening:   :       3 most recent PHQ Screens 2022   PHQ Not Done -   Little interest or pleasure in doing things Not at all   Feeling down, depressed, irritable, or hopeless Not at all   Total Score PHQ 2 0   Trouble falling or staying asleep, or sleeping too much -   Feeling tired or having little energy -   Poor appetite, weight loss, or overeating -   Feeling bad about yourself - or that you are a failure or have let yourself or your family down -   Trouble concentrating on things such as school, work, reading, or watching TV -   Moving or speaking so slowly that other people could have noticed; or the opposite being so fidgety that others notice -   Thoughts of being better off dead, or hurting yourself in some way -   PHQ 9 Score -   How difficult have these problems made it for you to do your work, take care of your home and get along with others -        Fall Risk Assessment:   :     No flowsheet data found. Abuse Screening:   :       Abuse Screening Questionnaire 4/21/2020 12/28/2018   Do you ever feel afraid of your partner? N N   Are you in a relationship with someone who physically or mentally threatens you? N N   Is it safe for you to go home?  Y Y        ADL Screening:   :       ADL Assessment 12/10/2014   Feeding yourself No Help Needed   Getting from bed to chair No Help Needed   Getting dressed No Help Needed   Bathing or showering No Help Needed   Walk across the room (includes cane/walker) No Help Needed   Using the telphone No Help Needed   Taking your medications No Help Needed   Preparing meals No Help Needed   Managing money (expenses/bills) No Help Needed   Moderately strenuous housework (laundry) No Help Needed   Shopping for personal items (toiletries/medicines) No Help Needed   Shopping for groceries No Help Needed   Driving No Help Needed   Climbing a flight of stairs No Help Needed   Getting to places beyond walking distances No Help Needed

## 2022-03-07 RX ORDER — ALBUTEROL SULFATE 90 UG/1
AEROSOL, METERED RESPIRATORY (INHALATION)
Qty: 8.5 G | Refills: 5 | Status: SHIPPED | OUTPATIENT
Start: 2022-03-07

## 2022-03-18 PROBLEM — G56.03 BILATERAL CARPAL TUNNEL SYNDROME: Status: ACTIVE | Noted: 2019-12-16

## 2022-03-19 PROBLEM — E66.01 MORBID OBESITY WITH BMI OF 45.0-49.9, ADULT (HCC): Status: ACTIVE | Noted: 2017-07-13

## 2022-03-19 PROBLEM — R73.03 PREDIABETES: Status: ACTIVE | Noted: 2022-01-24

## 2022-03-19 PROBLEM — F32.1 CURRENT MODERATE EPISODE OF MAJOR DEPRESSIVE DISORDER WITHOUT PRIOR EPISODE (HCC): Status: ACTIVE | Noted: 2021-05-14

## 2022-03-19 PROBLEM — M17.0 PRIMARY OSTEOARTHRITIS OF BOTH KNEES: Status: ACTIVE | Noted: 2017-09-11

## 2022-10-13 LAB
25(OH)D3+25(OH)D2 SERPL-MCNC: 36.1 NG/ML (ref 30–100)
ALBUMIN SERPL-MCNC: 4.5 G/DL (ref 3.8–4.8)
ALBUMIN/GLOB SERPL: 2 {RATIO} (ref 1.2–2.2)
ALP SERPL-CCNC: 94 IU/L (ref 44–121)
ALT SERPL-CCNC: 21 IU/L (ref 0–32)
AST SERPL-CCNC: 16 IU/L (ref 0–40)
BASOPHILS # BLD AUTO: 0.1 X10E3/UL (ref 0–0.2)
BASOPHILS NFR BLD AUTO: 1 %
BILIRUB SERPL-MCNC: <0.2 MG/DL (ref 0–1.2)
BUN SERPL-MCNC: 18 MG/DL (ref 8–27)
BUN/CREAT SERPL: 19 (ref 12–28)
CALCIUM SERPL-MCNC: 9.6 MG/DL (ref 8.7–10.3)
CHLORIDE SERPL-SCNC: 100 MMOL/L (ref 96–106)
CHOLEST SERPL-MCNC: 159 MG/DL (ref 100–199)
CO2 SERPL-SCNC: 29 MMOL/L (ref 20–29)
CREAT SERPL-MCNC: 0.96 MG/DL (ref 0.57–1)
EGFR: 66 ML/MIN/1.73
EOSINOPHIL # BLD AUTO: 0.5 X10E3/UL (ref 0–0.4)
EOSINOPHIL NFR BLD AUTO: 6 %
ERYTHROCYTE [DISTWIDTH] IN BLOOD BY AUTOMATED COUNT: 14.3 % (ref 11.7–15.4)
EST. AVERAGE GLUCOSE BLD GHB EST-MCNC: 131 MG/DL
GLOBULIN SER CALC-MCNC: 2.3 G/DL (ref 1.5–4.5)
GLUCOSE SERPL-MCNC: 107 MG/DL (ref 70–99)
HBA1C MFR BLD: 6.2 % (ref 4.8–5.6)
HCT VFR BLD AUTO: 38.2 % (ref 34–46.6)
HDLC SERPL-MCNC: 49 MG/DL
HGB BLD-MCNC: 12.5 G/DL (ref 11.1–15.9)
IMM GRANULOCYTES # BLD AUTO: 0.1 X10E3/UL (ref 0–0.1)
IMM GRANULOCYTES NFR BLD AUTO: 1 %
LDLC SERPL CALC-MCNC: 89 MG/DL (ref 0–99)
LYMPHOCYTES # BLD AUTO: 2.1 X10E3/UL (ref 0.7–3.1)
LYMPHOCYTES NFR BLD AUTO: 24 %
MCH RBC QN AUTO: 25.8 PG (ref 26.6–33)
MCHC RBC AUTO-ENTMCNC: 32.7 G/DL (ref 31.5–35.7)
MCV RBC AUTO: 79 FL (ref 79–97)
MONOCYTES # BLD AUTO: 0.5 X10E3/UL (ref 0.1–0.9)
MONOCYTES NFR BLD AUTO: 6 %
NEUTROPHILS # BLD AUTO: 5.5 X10E3/UL (ref 1.4–7)
NEUTROPHILS NFR BLD AUTO: 62 %
PLATELET # BLD AUTO: 320 X10E3/UL (ref 150–450)
POTASSIUM SERPL-SCNC: 4.9 MMOL/L (ref 3.5–5.2)
PROT SERPL-MCNC: 6.8 G/DL (ref 6–8.5)
RBC # BLD AUTO: 4.84 X10E6/UL (ref 3.77–5.28)
SODIUM SERPL-SCNC: 143 MMOL/L (ref 134–144)
TRIGL SERPL-MCNC: 120 MG/DL (ref 0–149)
VLDLC SERPL CALC-MCNC: 21 MG/DL (ref 5–40)
WBC # BLD AUTO: 8.7 X10E3/UL (ref 3.4–10.8)

## 2022-10-22 NOTE — PROGRESS NOTES
Will discuss at 10/25/22 appt. A1c 6.2% (prediabetes), was 6.1% a yr ago.  Normal kidney and liver tests, blood counts, cholesterol, and vitamin D.

## 2022-10-25 ENCOUNTER — OFFICE VISIT (OUTPATIENT)
Dept: INTERNAL MEDICINE CLINIC | Age: 62
End: 2022-10-25
Payer: COMMERCIAL

## 2022-10-25 VITALS
WEIGHT: 293 LBS | DIASTOLIC BLOOD PRESSURE: 86 MMHG | SYSTOLIC BLOOD PRESSURE: 138 MMHG | HEIGHT: 64 IN | OXYGEN SATURATION: 96 % | HEART RATE: 74 BPM | BODY MASS INDEX: 50.02 KG/M2 | TEMPERATURE: 98.6 F | RESPIRATION RATE: 18 BRPM

## 2022-10-25 DIAGNOSIS — M17.0 PRIMARY OSTEOARTHRITIS OF BOTH KNEES: ICD-10-CM

## 2022-10-25 DIAGNOSIS — I10 HYPERTENSION, ESSENTIAL, BENIGN: Primary | ICD-10-CM

## 2022-10-25 DIAGNOSIS — E66.01 MORBID OBESITY WITH BMI OF 45.0-49.9, ADULT (HCC): ICD-10-CM

## 2022-10-25 DIAGNOSIS — E78.00 HYPERCHOLESTEROLEMIA: ICD-10-CM

## 2022-10-25 DIAGNOSIS — Z23 NEEDS FLU SHOT: ICD-10-CM

## 2022-10-25 PROCEDURE — 99214 OFFICE O/P EST MOD 30 MIN: CPT | Performed by: INTERNAL MEDICINE

## 2022-10-25 PROCEDURE — 3074F SYST BP LT 130 MM HG: CPT | Performed by: INTERNAL MEDICINE

## 2022-10-25 PROCEDURE — 90686 IIV4 VACC NO PRSV 0.5 ML IM: CPT | Performed by: INTERNAL MEDICINE

## 2022-10-25 PROCEDURE — 3078F DIAST BP <80 MM HG: CPT | Performed by: INTERNAL MEDICINE

## 2022-10-25 PROCEDURE — 90471 IMMUNIZATION ADMIN: CPT | Performed by: INTERNAL MEDICINE

## 2022-10-25 RX ORDER — NYSTATIN AND TRIAMCINOLONE ACETONIDE 100000; 1 [USP'U]/G; MG/G
CREAM TOPICAL
COMMUNITY
Start: 2022-09-02

## 2022-10-25 RX ORDER — NAPROXEN SODIUM 220 MG
220 TABLET ORAL DAILY
COMMUNITY

## 2022-10-25 RX ORDER — BISMUTH SUBSALICYLATE 262 MG
1 TABLET,CHEWABLE ORAL DAILY
COMMUNITY

## 2022-10-25 NOTE — PROGRESS NOTES
CC:   Chief Complaint   Patient presents with    Hypertension    Cholesterol Problem    Blood sugar problem       HISTORY OF PRESENT ILLNESS  Julia Tobin is a 57 y.o. female. Presents for follow up evaluation. Last seen 9 months ago. She has HTN, hyperlipidemia, depression, anxiety, OA knees, seasonal allergic rhinitis, and morbid obesity    Complains of left heel pain. Scheduled an appointment to see a podiatrist in [place] about this. Has bilateral knee pain. Follows with Dr. Saba Lopez. Told her she needs bilateral knee replacements but wants her to lose weight first; she does not know how much weight. Denies HA's, CP, SOB, dizziness, heart palpitations, or leg swelling. Home BP monitoring: not done    Health Maintenance  Flu vaccine: today  COVID-19 vaccine: due for 2nd booster vaccine  Mammogram: summer 2022, [facility]  Pap smear: 9/21/22, Dr. Eriberto Garsia, [facility]  . Has 3 children. Works as  for [employer]. Never smoker. Drinks alcohol rarely. No regular exercise. ROS  A complete review of systems was performed and is negative except for those mentioned in the HPI.        Patient Active Problem List   Diagnosis Code    Hypertension, essential, benign I10    Seasonal allergic rhinitis J30.2    Hypercholesterolemia E78.00    Generalized anxiety disorder F41.1    Morbid obesity with BMI of 45.0-49.9, adult (HCC) E66.01, Z68.42    Primary osteoarthritis of both knees M17.0    Bilateral carpal tunnel syndrome G56.03    Current moderate episode of major depressive disorder without prior episode ([facility] [address] F32.1    Prediabetes R73.03     Past Medical History:   Diagnosis Date    Arthritis     Knees    Environmental allergies     Hives     HTN (hypertension) 5/20/2013    Hypercholesterolemia      Allergies   Allergen Reactions    Amlodipine Angioedema       Current Outpatient Medications   Medication Sig Dispense Refill    naproxen sodium (NAPROSYN) 220 mg tablet Take 220 mg by mouth daily. nystatin-triamcinolone (MYCOLOG II) topical cream APPLY TOPICALLY TO THE AFFECTED AREA TWICE DAILY IN THE MORNING AND IN THE EVENING      multivitamin (ONE A DAY) tablet Take 1 Tablet by mouth daily. pravastatin (PRAVACHOL) 20 mg tablet TAKE 1 TABLET BY MOUTH EVERY EVENING 30 Tablet 5    DULoxetine (CYMBALTA) 60 mg capsule TAKE 1 CAPSULE BY MOUTH TWICE DAILY 60 Capsule 5    lisinopril-hydroCHLOROthiazide (PRINZIDE, ZESTORETIC) 20-12.5 mg per tablet TAKE 2 TABLETS BY MOUTH DAILY 60 Tablet 5    traZODone (DESYREL) 50 mg tablet TAKE 1 AND 1/2 TO 2 TABLETS BY MOUTH EVERY EVENING 60 Tablet 5    albuterol (PROVENTIL HFA, VENTOLIN HFA, PROAIR HFA) 90 mcg/actuation inhaler INHALE 2 PUFFS BY MOUTH EVERY 6 HOURS AS NEEDED FOR WHEEZING 8.5 g 5    Cetirizine (ZyrTEC) 10 mg cap Take  by mouth. PHYSICAL EXAM  Visit Vitals  /86 (BP 1 Location: Right lower arm, BP Patient Position: Sitting, BP Cuff Size: Adult)   Pulse 74   Temp 98.6 °F (37 °C) (Oral)   Resp 18   Ht 5' 4\" (1.626 m)   Wt 300 lb 6.4 oz (136.3 kg)   SpO2 96%   BMI 51.56 kg/m²       General: Morbidly obese, no distress. HEENT:  Head normocephalic/atraumatic, no scleral icterus  Neck: Supple. No carotid bruits, JVD, lymphadenopathy, or thyromegaly. Lungs:  Clear to ausculation bilaterally. Good air movement. Heart:  Regular rate and rhythm, normal S1 and S2, no murmur, gallop, or rub  Extremities: No clubbing, cyanosis, or edema. Normal ROM with crepitus at both knees. Neurological: Alert and oriented. Non-focal exam.  Psychiatric: Normal mood and affect.  Behavior is normal.     Labs drawn 10/12/22:  Results for orders placed or performed in visit on 75/92/40   METABOLIC PANEL, COMPREHENSIVE   Result Value Ref Range    Glucose 107 (H) 70 - 99 mg/dL    BUN 18 8 - 27 mg/dL    Creatinine 0.96 0.57 - 1.00 mg/dL    eGFR 66 >59 mL/min/1.73    BUN/Creatinine ratio 19 12 - 28    Sodium 143 134 - 144 mmol/L Potassium 4.9 3.5 - 5.2 mmol/L    Chloride 100 96 - 106 mmol/L    CO2 29 20 - 29 mmol/L    Calcium 9.6 8.7 - 10.3 mg/dL    Protein, total 6.8 6.0 - 8.5 g/dL    Albumin 4.5 3.8 - 4.8 g/dL    GLOBULIN, TOTAL 2.3 1.5 - 4.5 g/dL    A-G Ratio 2.0 1.2 - 2.2    Bilirubin, total <0.2 0.0 - 1.2 mg/dL    Alk. phosphatase 94 44 - 121 IU/L    AST (SGOT) 16 0 - 40 IU/L    ALT (SGPT) 21 0 - 32 IU/L   CBC WITH AUTOMATED DIFF   Result Value Ref Range    WBC 8.7 3.4 - 10.8 x10E3/uL    RBC 4.84 3.77 - 5.28 x10E6/uL    HGB 12.5 11.1 - 15.9 g/dL    HCT 38.2 34.0 - 46.6 %    MCV 79 79 - 97 fL    MCH 25.8 (L) 26.6 - 33.0 pg    MCHC 32.7 31.5 - 35.7 g/dL    RDW 14.3 11.7 - 15.4 %    PLATELET 169 747 - 021 x10E3/uL    NEUTROPHILS 62 Not Estab. %    Lymphocytes 24 Not Estab. %    MONOCYTES 6 Not Estab. %    EOSINOPHILS 6 Not Estab. %    BASOPHILS 1 Not Estab. %    ABS. NEUTROPHILS 5.5 1.4 - 7.0 x10E3/uL    Abs Lymphocytes 2.1 0.7 - 3.1 x10E3/uL    ABS. MONOCYTES 0.5 0.1 - 0.9 x10E3/uL    ABS. EOSINOPHILS 0.5 (H) 0.0 - 0.4 x10E3/uL    ABS. BASOPHILS 0.1 0.0 - 0.2 x10E3/uL    IMMATURE GRANULOCYTES 1 Not Estab. %    ABS. IMM. GRANS. 0.1 0.0 - 0.1 x10E3/uL   HEMOGLOBIN A1C WITH EAG   Result Value Ref Range    Hemoglobin A1c 6.2 (H) 4.8 - 5.6 %    Estimated average glucose 131 mg/dL   LIPID PANEL   Result Value Ref Range    Cholesterol, total 159 100 - 199 mg/dL    Triglyceride 120 0 - 149 mg/dL    HDL Cholesterol 49 >39 mg/dL    VLDL, calculated 21 5 - 40 mg/dL    LDL, calculated 89 0 - 99 mg/dL   VITAMIN D, 25 HYDROXY   Result Value Ref Range    VITAMIN D, 25-HYDROXY 36.1 30.0 - 100.0 ng/mL         ASSESSMENT AND PLAN    ICD-10-CM ICD-9-CM    1. Hypertension, essential, benign  I10 401.1       2. Hypercholesterolemia  E78.00 272.0       3. Primary osteoarthritis of both knees  M17.0 715.16       4. Morbid obesity with BMI of 45.0-49.9, adult ([facility])  E66.01 278.01     Z68.42 V85.42       5.  Needs flu shot  Z23 V04.81 INFLUENZA, FLUARIX, FLULAVAL, FLUZONE (AGE 6 MO+), AFLURIA(AGE 3Y+) IM, PF, 0.5 ML        Diagnoses and all orders for this visit:    1. Hypertension, essential, benign  Controlled. Continue lisinopril-HCTZ 20-12.5 mg 2 tabs daily. 2. Hypercholesterolemia  Controlled on pravastatin. 3. Primary osteoarthritis of both knees  Continue Aleve as needed for pain. Work on weight loss in order to have TKR's. 4. Morbid obesity with BMI of 45.0-49.9, adult ([address]  Counseled on low-carb diet. Patient [facility]: Call [phone]    5. Needs flu shot  -     INFLUENZA, FLUARIX, FLULAVAL, FLUZONE (AGE 6 MO+), AFLURIA(AGE 3Y+) IM, PF, 0.5 ML      Follow-up and Dispositions    Return in about 6 months (around 4/25/2023), or if symptoms worsen or fail to improve, for HTN, chol. I have discussed the diagnosis with the patient and the intended plan as seen in the above orders. Patient is in agreement. The patient has received an after-visit summary and questions were answered concerning future plans. I have discussed medication side effects and warnings with the patient as well.

## 2022-10-25 NOTE — PROGRESS NOTES
Stephanie Aj  Identified pt with two pt identifiers(name and ). Chief Complaint   Patient presents with    Hypertension    Cholesterol Problem    Blood sugar problem       Reviewed record In preparation for visit and have obtained necessary documentation. 1. Have you been to the ER, urgent care clinic or hospitalized since your last visit? No     2. Have you seen or consulted any other health care providers outside of the [address] [place] [address] since your last visit? Include any pap smears or colon screening. No    Vitals reviewed with provider. Health Maintenance reviewed: After verbal order read back of Dr Saida Scanlon, patient received Flu Shot in left deltoid. [address]: [id number] Lot: [id number]. Patient tolerated procedure without complaints and received VIS.      Health Maintenance Due   Topic    Breast Cancer Screen Mammogram     COVID-19 Vaccine (4 - Booster for Moderna series)    Flu Vaccine (1)    Cervical cancer screen           Wt Readings from Last 3 Encounters:   10/25/22 300 lb 6.4 oz (136.3 kg)   21 298 lb 3.2 oz (135.3 kg)   21 309 lb (140.2 kg)        Temp Readings from Last 3 Encounters:   10/25/22 98.6 °F (37 °C) (Oral)   21 97.9 °F (36.6 °C) (Oral)   21 98.1 °F (36.7 °C) (Oral)        BP Readings from Last 3 Encounters:   10/25/22 (!) 144/88   21 138/77   21 (!) 143/84        Pulse Readings from Last 3 Encounters:   10/25/22 74   21 66   21 71        Vitals:    10/25/22 1309   BP: (!) 144/88   Pulse: 74   Resp: 18   Temp: 98.6 °F (37 °C)   TempSrc: Oral   SpO2: 96%   Weight: 300 lb 6.4 oz (136.3 kg)   Height: 5' 4\" (1.626 m)   PainSc:   8   PainLoc: Knee          Learning Assessment:   :       Learning Assessment 11/10/2014   PRIMARY LEARNER Patient   HIGHEST LEVEL OF EDUCATION - PRIMARY LEARNER  2 YEARS OF COLLEGE   BARRIERS PRIMARY LEARNER NONE   CO-LEARNER CAREGIVER No   PRIMARY LANGUAGE ENGLISH   LEARNER PREFERENCE PRIMARY DEMONSTRATION   ANSWERED BY patient   RELATIONSHIP SELF        Depression Screening:   :       3 most recent PHQ Screens 10/25/2022   PHQ Not Done -   Little interest or pleasure in doing things Not at all   Feeling down, depressed, irritable, or hopeless Not at all   Total Score PHQ 2 0   Trouble falling or staying asleep, or sleeping too much -   Feeling tired or having little energy -   Poor appetite, weight loss, or overeating -   Feeling bad about yourself - or that you are a failure or have let yourself or your family down -   Trouble concentrating on things such as school, work, reading, or watching TV -   Moving or speaking so slowly that other people could have noticed; or the opposite being so fidgety that others notice -   Thoughts of being better off dead, or hurting yourself in some way -   PHQ 9 Score -   How difficult have these problems made it for you to do your work, take care of your home and get along with others -        Fall Risk Assessment:   :     No flowsheet data found. Abuse Screening:   :       Abuse Screening Questionnaire 4/21/2020 12/28/2018   Do you ever feel afraid of your partner? N N   Are you in a relationship with someone who physically or mentally threatens you? N N   Is it safe for you to go home?  Y Y        ADL Screening:   :       ADL Assessment 12/10/2014   Feeding yourself No Help Needed   Getting from bed to chair No Help Needed   Getting dressed No Help Needed   Bathing or showering No Help Needed   Walk across the room (includes cane/walker) No Help Needed   Using the telphone No Help Needed   Taking your medications No Help Needed   Preparing meals No Help Needed   Managing money (expenses/bills) No Help Needed   Moderately strenuous housework (laundry) No Help Needed   Shopping for personal items (toiletries/medicines) No Help Needed   Shopping for groceries No Help Needed   Driving No Help Needed   Climbing a flight of stairs No Help Needed   Getting to places beyond walking distances No Help Needed

## 2022-10-25 NOTE — PATIENT INSTRUCTIONS
Vaccine Information Statement    Influenza (Flu) Vaccine (Inactivated or Recombinant): What You Need to Know    Many vaccine information statements are available in Maltese and other languages. See www.immunize.org/vis. Hojas de información sobre vacunas están disponibles en español y en muchos otros idiomas. Visite www.immunize.org/vis. 1. Why get vaccinated? Influenza vaccine can prevent influenza (flu). Flu is a contagious disease that spreads around the [facility] every year, usually between October and May. Anyone can get the flu, but it is more dangerous for some people. Infants and young children, people 72 years and older, pregnant people, and people with certain health conditions or a weakened immune system are at greatest risk of flu complications. Pneumonia, bronchitis, sinus infections, and ear infections are examples of flu-related complications. If you have a medical condition, such as heart disease, cancer, or diabetes, flu can make it worse. Flu can cause fever and chills, sore throat, muscle aches, fatigue, cough, headache, and runny or stuffy nose. Some people may have vomiting and diarrhea, though this is more common in children than adults. In an average year, thousands of people in the [facility] die from flu, and many more are hospitalized. Flu vaccine prevents millions of illnesses and flu-related visits to the doctor each year. 2. Influenza vaccines     CDC recommends everyone 6 months and older get vaccinated every flu season. Children 6 months through 6years of age may need 2 doses during a single flu season. Everyone else needs only 1 dose each flu season. It takes about 2 weeks for protection to develop after vaccination. There are many flu viruses, and they are always changing. Each year a new flu vaccine is made to protect against the influenza viruses believed to be likely to cause disease in the upcoming flu season.  Even when the vaccine doesnt exactly match these viruses, it may still provide some protection. Influenza vaccine does not cause flu. Influenza vaccine may be given at the same time as other vaccines. 3. Talk with your health care provider    Tell your vaccination provider if the person getting the vaccine:  Has had an allergic reaction after a previous dose of influenza vaccine, or has any severe, life-threatening allergies   Has ever had Guillain-Barré Syndrome (also called GBS)    In some cases, your health care provider may decide to postpone influenza vaccination until a future visit. Influenza vaccine can be administered at any time during pregnancy. People who are or will be pregnant during influenza season should receive inactivated influenza vaccine. People with minor illnesses, such as a cold, may be vaccinated. People who are moderately or severely ill should usually wait until they recover before getting influenza vaccine. Your health care provider can give you more information. 4. Risks of a vaccine reaction    Soreness, redness, and swelling where the shot is given, fever, muscle aches, and headache can happen after influenza vaccination. There may be a very small increased risk of Guillain-Barré Syndrome (GBS) after inactivated influenza vaccine (the flu shot). [facility] children who get the flu shot along with pneumococcal vaccine (PCV13) and/or DTaP vaccine at the same time might be slightly more likely to have a seizure caused by fever. Tell your health care provider if a child who is getting flu vaccine has ever had a seizure. People sometimes faint after medical procedures, including vaccination. Tell your provider if you feel dizzy or have vision changes or ringing in the ears. As with any medicine, there is a very remote chance of a vaccine causing a severe allergic reaction, other serious injury, or death. 5. What if there is a serious problem?     An allergic reaction could occur after the vaccinated person leaves the clinic. If you see signs of a severe allergic reaction (hives, swelling of the face and throat, difficulty breathing, a fast heartbeat, dizziness, or weakness), call 9-1-1 and get the person to the nearest hospital.    For other signs that concern you, call your health care provider. Adverse reactions should be reported to the Vaccine Adverse Event Reporting System (VAERS). Your health care provider will usually file this report, or you can do it yourself. Visit the VAERS website at www.vaers. [facility].gov or call 1-807.522.6460. VAERS is only for reporting reactions, and VAERS staff members do not give medical advice. 6. The National Vaccine Injury Compensation Program    The [facility] Vaccine Injury Compensation Program (VICP) is a federal program that was created to compensate people who may have been injured by certain vaccines. Claims regarding alleged injury or death due to vaccination have a time limit for filing, which may be as short as two years. Visit the VICP website at www.[facility].gov/vaccinecompensation or call 8-361.358.9358 to learn about the program and about filing a claim. 7. How can I learn more? Ask your health care provider. Call your local or state health department. Visit the website of the Food and Drug Administration (FDA) for vaccine package inserts and additional information at www.fda.gov/vaccines-blood-biologics/vaccines. Contact the Centers for Disease Control and Prevention (CDC): Call 3-116.784.5933 (2-881-EMN-INFO) or  Visit CDCs influenza website at www.cdc.gov/flu. Vaccine Information Statement   Inactivated Influenza Vaccine   8/6/2021  42 RAUL Tristan Dm [id number]     Department of Health and Human Services  Centers for Disease Control and Prevention    Office Use Only        Patient Information  [facility]: Call [phone]

## 2023-03-20 DIAGNOSIS — I10 HYPERTENSION, ESSENTIAL, BENIGN: ICD-10-CM

## 2023-03-20 DIAGNOSIS — F32.1 CURRENT MODERATE EPISODE OF MAJOR DEPRESSIVE DISORDER WITHOUT PRIOR EPISODE (HCC): ICD-10-CM

## 2023-03-20 DIAGNOSIS — F51.04 CHRONIC INSOMNIA: ICD-10-CM

## 2023-03-20 DIAGNOSIS — E78.00 HYPERCHOLESTEROLEMIA: ICD-10-CM

## 2023-03-20 RX ORDER — PRAVASTATIN SODIUM 20 MG/1
TABLET ORAL
Qty: 30 TABLET | Refills: 5 | Status: SHIPPED | OUTPATIENT
Start: 2023-03-20

## 2023-03-20 RX ORDER — DULOXETIN HYDROCHLORIDE 60 MG/1
CAPSULE, DELAYED RELEASE ORAL
Qty: 60 CAPSULE | Refills: 5 | Status: SHIPPED | OUTPATIENT
Start: 2023-03-20

## 2023-03-20 RX ORDER — LISINOPRIL AND HYDROCHLOROTHIAZIDE 12.5; 2 MG/1; MG/1
TABLET ORAL
Qty: 60 TABLET | Refills: 5 | Status: SHIPPED | OUTPATIENT
Start: 2023-03-20

## 2023-03-20 RX ORDER — TRAZODONE HYDROCHLORIDE 50 MG/1
TABLET ORAL
Qty: 60 TABLET | Refills: 5 | Status: SHIPPED | OUTPATIENT
Start: 2023-03-20

## 2023-04-24 ENCOUNTER — OFFICE VISIT (OUTPATIENT)
Dept: INTERNAL MEDICINE CLINIC | Age: 63
End: 2023-04-24
Payer: COMMERCIAL

## 2023-04-24 VITALS
TEMPERATURE: 98.1 F | OXYGEN SATURATION: 97 % | HEART RATE: 68 BPM | BODY MASS INDEX: 50.02 KG/M2 | HEIGHT: 64 IN | WEIGHT: 293 LBS | SYSTOLIC BLOOD PRESSURE: 140 MMHG | DIASTOLIC BLOOD PRESSURE: 84 MMHG

## 2023-04-24 DIAGNOSIS — M17.0 PRIMARY OSTEOARTHRITIS OF BOTH KNEES: ICD-10-CM

## 2023-04-24 DIAGNOSIS — I10 HYPERTENSION, ESSENTIAL, BENIGN: Primary | ICD-10-CM

## 2023-04-24 DIAGNOSIS — Z78.0 ASYMPTOMATIC POSTMENOPAUSAL STATUS: ICD-10-CM

## 2023-04-24 DIAGNOSIS — F32.1 CURRENT MODERATE EPISODE OF MAJOR DEPRESSIVE DISORDER WITHOUT PRIOR EPISODE (HCC): ICD-10-CM

## 2023-04-24 DIAGNOSIS — E66.01 MORBID OBESITY WITH BMI OF 45.0-49.9, ADULT (HCC): ICD-10-CM

## 2023-04-24 DIAGNOSIS — J45.20 MILD INTERMITTENT REACTIVE AIRWAY DISEASE WITHOUT COMPLICATION: ICD-10-CM

## 2023-04-24 DIAGNOSIS — Z13.820 SCREENING FOR OSTEOPOROSIS: ICD-10-CM

## 2023-04-24 PROCEDURE — 3077F SYST BP >= 140 MM HG: CPT | Performed by: INTERNAL MEDICINE

## 2023-04-24 PROCEDURE — 1123F ACP DISCUSS/DSCN MKR DOCD: CPT | Performed by: INTERNAL MEDICINE

## 2023-04-24 PROCEDURE — 99214 OFFICE O/P EST MOD 30 MIN: CPT | Performed by: INTERNAL MEDICINE

## 2023-04-24 PROCEDURE — 3079F DIAST BP 80-89 MM HG: CPT | Performed by: INTERNAL MEDICINE

## 2023-04-24 RX ORDER — SEMAGLUTIDE 0.25 MG/.5ML
0.25 INJECTION, SOLUTION SUBCUTANEOUS
Qty: 2 EACH | Refills: 0 | Status: SHIPPED | OUTPATIENT
Start: 2023-04-24

## 2023-04-24 RX ORDER — ALBUTEROL SULFATE 90 UG/1
AEROSOL, METERED RESPIRATORY (INHALATION)
Qty: 8.5 G | Refills: 5 | Status: SHIPPED | OUTPATIENT
Start: 2023-04-24

## 2023-04-24 RX ORDER — METOPROLOL SUCCINATE 25 MG/1
25 TABLET, EXTENDED RELEASE ORAL DAILY
Qty: 30 TABLET | Refills: 2 | Status: SHIPPED | OUTPATIENT
Start: 2023-04-24

## 2023-04-24 RX ORDER — PEN NEEDLE, DIABETIC 30 GX3/16"
NEEDLE, DISPOSABLE MISCELLANEOUS
Qty: 30 EACH | Refills: 0 | Status: SHIPPED | OUTPATIENT
Start: 2023-04-24

## 2023-04-24 NOTE — PROGRESS NOTES
Chief Complaint   Patient presents with    Hypertension    Cholesterol Problem       HISTORY OF PRESENT ILLNESS  David Bradford is a 70 y.o. female    Presents for 6 month follow up evaluation. She has HTN, hyperlipidemia, depression, anxiety, OA knees, seasonal allergic rhinitis, and morbid obesity. Complains of getting lightheaded, face being red, and heart palpitations after grocery shopping recently. Drank water and sat down to rest before feeling better. Continues to have bilateral knee pain. Follows with Dr. Paul He. Was told she needs to lose weight before having knee replacements. Saw podiatrist for left heel pain. X-rays showed large bone spur. Was sent to PT which she did for 16 for weeks. Pain improved about 80%. Requests refill on albuterol inhaler. Sometimes has SOB when seasonal allergy symptoms worsen.     COVID vaccine: had 3/4 vaccines, declined 4th vaccine at this time  Pneumonia vaccine: due  DEXA: due    Patient Active Problem List   Diagnosis Code    Hypertension, essential, benign I10    Seasonal allergic rhinitis J30.2    Hypercholesterolemia E78.00    Generalized anxiety disorder F41.1    Morbid obesity with BMI of 45.0-49.9, adult (HCC) E66.01, Z68.42    Primary osteoarthritis of both knees M17.0    Bilateral carpal tunnel syndrome G56.03    Current moderate episode of major depressive disorder without prior episode ([facility] [address] [facility] F32.1    Prediabetes R73.03     Past Medical History:   Diagnosis Date    Arthritis     Knees    Environmental allergies     Hives     HTN (hypertension) 5/20/2013    Hypercholesterolemia      Allergies   Allergen Reactions    Amlodipine Angioedema       Current Outpatient Medications   Medication Sig Dispense Refill    pravastatin (PRAVACHOL) 20 mg tablet TAKE 1 TABLET BY MOUTH EVERY EVENING 30 Tablet 5    lisinopril-hydroCHLOROthiazide (PRINZIDE, ZESTORETIC) 20-12.5 mg per tablet TAKE 2 TABLETS BY MOUTH DAILY 60 Tablet 5    DULoxetine (CYMBALTA) 60 mg capsule TAKE 1 CAPSULE BY MOUTH TWICE DAILY 60 Capsule 5    traZODone (DESYREL) 50 mg tablet TAKE 1 AND 1/2 TO 2 TABLETS BY MOUTH EVERY EVENING 60 Tablet 5    naproxen sodium (NAPROSYN) 220 mg tablet Take 1 Tablet by mouth daily. albuterol (PROVENTIL HFA, VENTOLIN HFA, PROAIR HFA) 90 mcg/actuation inhaler INHALE 2 PUFFS BY MOUTH EVERY 6 HOURS AS NEEDED FOR WHEEZING 8.5 g 5    Cetirizine (ZyrTEC) 10 mg cap Take  by mouth. PHYSICAL EXAM  Visit Vitals  BP (!) 140/84 (BP 1 Location: Right lower arm, BP Patient Position: Sitting, BP Cuff Size: Adult)   Pulse 68   Temp 98.1 °F (36.7 °C) (Oral)   Ht 5' 4\" (1.626 m)   Wt 299 lb (135.6 kg)   SpO2 97%   BMI 51.32 kg/m²       General: Morbidly obese, no distress. HEENT:  Head normocephalic/atraumatic, no scleral icterus  Neck: Supple. No carotid bruits, JVD, lymphadenopathy, or thyromegaly. Lungs:  Clear to ausculation bilaterally. Good air movement. Heart:  Regular rate and rhythm, normal S1 and S2, no murmur, gallop, or rub  Extremities: No clubbing, cyanosis, or edema. Normal ROM with mild crepitus at both knees. Neurological: Alert and oriented. Non-focal exam.  Psychiatric: Normal mood and affect. Behavior is normal.          ASSESSMENT AND PLAN    ICD-10-CM ICD-9-CM    1. Hypertension, essential, benign  I10 401.1 metoprolol succinate (TOPROL-XL) 25 mg XL tablet      2. Morbid obesity with BMI of 45.0-49.9, adult ([facility])  E66.01 278.01 semaglutide, weight loss, (Wegovy) 0.25 mg/0.5 mL pnij    Z68.42 V85.42 Insulin Needles, Disposable, (BD Ultra-Fine Short Pen Needle) 31 gauge x 5/16\" ndle      3. Primary osteoarthritis of both knees  M17.0 715.16       4. Mild intermittent reactive airway disease without complication  J69.60 085.57 albuterol (PROVENTIL HFA, VENTOLIN HFA, PROAIR HFA) 90 mcg/actuation inhaler      5. Current moderate episode of major depressive disorder without prior episode ([facility])  F32.1 296.22       6.  Screening for osteoporosis  Z13.820 V82.81 DEXA BONE DENSITY STUDY AXIAL      7. Asymptomatic postmenopausal status  Z78.0 V49.81 DEXA BONE DENSITY STUDY AXIAL        Diagnoses and all orders for this visit:    1. Hypertension, essential, benign  Newly diagnosed. Will start on metoprolol. Has history of angioedema with amlodipine. Episode of lightheadedness and heart palpitations she had recently likely due to dehydration. Metoprolol will help reduce palpitations if the recur.  -     metoprolol succinate (TOPROL-XL) 25 mg XL tablet; Take 1 Tablet by mouth daily. 2. Morbid obesity with BMI of 45.0-49.9, adult ([facility] [address]  Start [facility]FORT KATELYN for weight loss. Will plan to increase dose monthly. Counseled on diet and exercise as well as possible side effects of Wegovy. -     semaglutide, weight loss, (Wegovy) 0.25 mg/0.5 mL pnij; 0.25 mg by SubCUTAneous route every seven (7) days. -     Insulin Needles, Disposable, (BD Ultra-Fine Short Pen Needle) 31 gauge x 5/16\" ndle; Use as directed with Wegovy pens. 3. Primary osteoarthritis of both knees  Follow up as scheduled with Dr. Rigo Ornelas. 4. Mild intermittent reactive airway disease without complication  -     Refill albuterol (PROVENTIL HFA, VENTOLIN HFA, PROAIR HFA) 90 mcg/actuation inhaler; INHALE 2 PUFFS BY MOUTH EVERY 6 HOURS AS NEEDED FOR WHEEZING    5. Current moderate episode of major depressive disorder without prior episode ([facility]  Controlled on present management. 6. Screening for osteoporosis  -     DEXA BONE DENSITY STUDY AXIAL; Future    7. Asymptomatic postmenopausal status  -     DEXA BONE DENSITY STUDY AXIAL; Future      Follow-up and Dispositions    Return in about 3 months (around 7/24/2023), or if symptoms worsen or fail to improve, for HTN, wt mgt. I have discussed the diagnosis with the patient and the intended plan as seen in the above orders. Patient is in agreement. The patient has received an after-visit summary and questions were answered concerning future plans.   I have discussed medication side effects and warnings with the patient as well.

## 2023-04-24 NOTE — PROGRESS NOTES
Stephanie Aj  Identified pt with two pt identifiers(name and ). Chief Complaint   Patient presents with    Hypertension    Cholesterol Problem       Reviewed record In preparation for visit and have obtained necessary documentation. 1. Have you been to the ER, urgent care clinic or hospitalized since your last visit? No     2. Have you seen or consulted any other health care providers outside of the [address] [place] [address] since your last visit? Include any pap smears or colon screening. Podiatrist for left Jose Roberto Ugalde reviewed with provider.     Health Maintenance reviewed:     Health Maintenance Due   Topic    COVID-19 Vaccine (4 - Booster for Moderna series)    Bone Densitometry (Dexa) Screening     Pneumococcal 65+ years (1 - PCV)          Wt Readings from Last 3 Encounters:   23 299 lb (135.6 kg)   10/25/22 300 lb 6.4 oz (136.3 kg)   21 298 lb 3.2 oz (135.3 kg)        Temp Readings from Last 3 Encounters:   23 98.1 °F (36.7 °C) (Oral)   10/25/22 98.6 °F (37 °C) (Oral)   21 97.9 °F (36.6 °C) (Oral)        BP Readings from Last 3 Encounters:   23 (!) 140/84   10/25/22 138/86   21 138/77        Pulse Readings from Last 3 Encounters:   23 68   10/25/22 74   21 66        Vitals:    23 0826 23 0831   BP: (!) 143/82 (!) 140/84   Pulse: 68    Temp: 98.1 °F (36.7 °C)    TempSrc: Oral    SpO2: 97%    Weight: 299 lb (135.6 kg)    Height: 5' 4\" (1.626 m)    PainSc:   7    PainLoc: Knee           Learning Assessment:   :       Learning Assessment 11/10/2014   PRIMARY LEARNER Patient   HIGHEST LEVEL OF EDUCATION - PRIMARY LEARNER  2 YEARS OF COLLEGE   BARRIERS PRIMARY LEARNER NONE   CO-LEARNER CAREGIVER No   PRIMARY LANGUAGE ENGLISH   LEARNER PREFERENCE PRIMARY DEMONSTRATION   ANSWERED BY patient   RELATIONSHIP SELF        Depression Screening:   :       3 most recent PHQ Screens 2023   PHQ Not Done -   Little interest or pleasure in doing things Not at all   Feeling down, depressed, irritable, or hopeless Not at all   Total Score PHQ 2 0   Trouble falling or staying asleep, or sleeping too much -   Feeling tired or having little energy -   Poor appetite, weight loss, or overeating -   Feeling bad about yourself - or that you are a failure or have let yourself or your family down -   Trouble concentrating on things such as school, work, reading, or watching TV -   Moving or speaking so slowly that other people could have noticed; or the opposite being so fidgety that others notice -   Thoughts of being better off dead, or hurting yourself in some way -   PHQ 9 Score -   How difficult have these problems made it for you to do your work, take care of your home and get along with others -        Fall Risk Assessment:   :     No flowsheet data found. Abuse Screening:   :       Abuse Screening Questionnaire 4/21/2020 12/28/2018   Do you ever feel afraid of your partner? N N   Are you in a relationship with someone who physically or mentally threatens you? N N   Is it safe for you to go home?  Y Y        ADL Screening:   :       ADL Assessment 12/10/2014   Feeding yourself No Help Needed   Getting from bed to chair No Help Needed   Getting dressed No Help Needed   Bathing or showering No Help Needed   Walk across the room (includes cane/walker) No Help Needed   Using the telphone No Help Needed   Taking your medications No Help Needed   Preparing meals No Help Needed   Managing money (expenses/bills) No Help Needed   Moderately strenuous housework (laundry) No Help Needed   Shopping for personal items (toiletries/medicines) No Help Needed   Shopping for groceries No Help Needed   Driving No Help Needed   Climbing a flight of stairs No Help Needed   Getting to places beyond walking distances No Help Needed

## 2023-05-08 ENCOUNTER — TRANSCRIBE ORDERS (OUTPATIENT)
Facility: HOSPITAL | Age: 63
End: 2023-05-08

## 2023-05-08 DIAGNOSIS — Z78.0 ASYMPTOMATIC POSTMENOPAUSAL STATUS: ICD-10-CM

## 2023-05-08 DIAGNOSIS — Z13.820 SCREENING FOR OSTEOPOROSIS: Primary | ICD-10-CM

## 2023-05-19 DIAGNOSIS — E66.01 MORBID OBESITY WITH BMI OF 45.0-49.9, ADULT (HCC): Primary | ICD-10-CM

## 2023-05-19 RX ORDER — SEMAGLUTIDE 0.25 MG/.5ML
INJECTION, SOLUTION SUBCUTANEOUS
Qty: 0.5 ML | Refills: 0 | Status: CANCELLED | OUTPATIENT
Start: 2023-05-19

## 2023-05-19 RX ORDER — SEMAGLUTIDE 0.5 MG/.5ML
0.5 INJECTION, SOLUTION SUBCUTANEOUS
Qty: 12 ML | Refills: 0 | Status: SHIPPED | OUTPATIENT
Start: 2023-05-19

## 2023-05-19 RX ORDER — SEMAGLUTIDE 0.25 MG/.5ML
INJECTION, SOLUTION SUBCUTANEOUS
COMMUNITY
Start: 2023-04-25 | End: 2023-05-19 | Stop reason: DRUGHIGH

## 2023-05-19 NOTE — TELEPHONE ENCOUNTER
PCP: Lisbet Araujo MD     Last appt:  4/24/2023      Future Appointments   Date Time Provider Pb [place]   8/18/2023  9:10 AM Lisbet Araujo MD [facility] BS AMB          Requested Prescriptions     Pending Prescriptions Disp Refills    WEGOVY 0.25 MG/0.5ML SOAJ SC injection 0.5 mL 0     Sig: ADMINISTER 0.25MG UNDER THE SKIN EVERY 7 DAYS

## 2023-06-01 ENCOUNTER — PATIENT MESSAGE (OUTPATIENT)
Facility: CLINIC | Age: 63
End: 2023-06-01

## 2023-06-01 DIAGNOSIS — E66.01 MORBID OBESITY WITH BMI OF 45.0-49.9, ADULT (HCC): Primary | ICD-10-CM

## 2023-06-05 NOTE — TELEPHONE ENCOUNTER
From: María Elena Bobo  To: Dr. Alessio Norton: 6/1/2023 2:11 PM EDT  Subject: [facility]FORT NEGAR 1.0    I have been unable to obtain my prescription for the 2nd month dosage of 1.0. I have contacted multiple pharmacies and they are all backordered with unknown fill date. I finished my first month last Saturday 5/27/23. The pharmacist suggested i contact you for an alternative medication. Please advise, I had to pay out of pocket $1300.00 for the first month of Wegovy, I dont want that to go to waste.     Usama Avila  [phone] (cell)

## 2023-07-07 DIAGNOSIS — E66.01 MORBID OBESITY WITH BMI OF 45.0-49.9, ADULT (HCC): ICD-10-CM

## 2023-07-07 RX ORDER — PHENTERMINE HYDROCHLORIDE 15 MG/1
15 CAPSULE ORAL EVERY MORNING
Qty: 30 CAPSULE | Refills: 0 | Status: SHIPPED | OUTPATIENT
Start: 2023-07-07 | End: 2023-08-06

## 2023-07-07 NOTE — TELEPHONE ENCOUNTER
PCP: Maye Lopes MD     Last appt:  4/24/2023      Future Appointments   Date Time Provider [address]   8/18/2023  9:10 AM Maye Lopes MD [facility] BS AMB          Requested Prescriptions     Pending Prescriptions Disp Refills    phentermine 15 MG capsule 30 capsule 0     Sig: Take 1 capsule by mouth every morning for 30 days.  Max Daily Amount: 15 mg

## 2023-07-17 RX ORDER — METOPROLOL SUCCINATE 25 MG/1
25 TABLET, EXTENDED RELEASE ORAL DAILY
Qty: 90 TABLET | Refills: 1 | Status: SHIPPED | OUTPATIENT
Start: 2023-07-17

## 2023-07-17 RX ORDER — METOPROLOL SUCCINATE 25 MG/1
25 TABLET, EXTENDED RELEASE ORAL DAILY
COMMUNITY
Start: 2023-06-15 | End: 2023-07-17 | Stop reason: SDUPTHER

## 2023-07-17 NOTE — TELEPHONE ENCOUNTER
PCP: Maye Lopes MD     Last appt:  4/24/2023      Future Appointments   Date Time Provider [address]   8/18/2023  9:10 AM Maye Lopes MD [facility] BS AMB          Requested Prescriptions     Pending Prescriptions Disp Refills    metoprolol succinate (TOPROL XL) 25 MG extended release tablet 90 tablet 1     Sig: Take 1 tablet by mouth daily

## 2023-08-16 SDOH — ECONOMIC STABILITY: INCOME INSECURITY: HOW HARD IS IT FOR YOU TO PAY FOR THE VERY BASICS LIKE FOOD, HOUSING, MEDICAL CARE, AND HEATING?: NOT VERY HARD

## 2023-08-16 SDOH — ECONOMIC STABILITY: HOUSING INSECURITY
IN THE LAST 12 MONTHS, WAS THERE A TIME WHEN YOU DID NOT HAVE A STEADY PLACE TO SLEEP OR SLEPT IN A SHELTER (INCLUDING NOW)?: NO

## 2023-08-16 SDOH — ECONOMIC STABILITY: FOOD INSECURITY: WITHIN THE PAST 12 MONTHS, THE FOOD YOU BOUGHT JUST DIDN'T LAST AND YOU DIDN'T HAVE MONEY TO GET MORE.: NEVER TRUE

## 2023-08-16 SDOH — ECONOMIC STABILITY: FOOD INSECURITY: WITHIN THE PAST 12 MONTHS, YOU WORRIED THAT YOUR FOOD WOULD RUN OUT BEFORE YOU GOT MONEY TO BUY MORE.: NEVER TRUE

## 2023-08-16 SDOH — ECONOMIC STABILITY: TRANSPORTATION INSECURITY
IN THE PAST 12 MONTHS, HAS LACK OF TRANSPORTATION KEPT YOU FROM MEETINGS, WORK, OR FROM GETTING THINGS NEEDED FOR DAILY LIVING?: NO

## 2023-08-18 ENCOUNTER — OFFICE VISIT (OUTPATIENT)
Facility: CLINIC | Age: 63
End: 2023-08-18
Payer: MEDICARE

## 2023-08-18 VITALS
HEART RATE: 62 BPM | OXYGEN SATURATION: 95 % | BODY MASS INDEX: 49.68 KG/M2 | DIASTOLIC BLOOD PRESSURE: 85 MMHG | SYSTOLIC BLOOD PRESSURE: 125 MMHG | WEIGHT: 291 LBS | TEMPERATURE: 98.2 F | HEIGHT: 64 IN | RESPIRATION RATE: 16 BRPM

## 2023-08-18 DIAGNOSIS — F32.1 CURRENT MODERATE EPISODE OF MAJOR DEPRESSIVE DISORDER WITHOUT PRIOR EPISODE (HCC): ICD-10-CM

## 2023-08-18 DIAGNOSIS — R73.03 PREDIABETES: ICD-10-CM

## 2023-08-18 DIAGNOSIS — E66.01 MORBID OBESITY WITH BMI OF 45.0-49.9, ADULT (HCC): ICD-10-CM

## 2023-08-18 DIAGNOSIS — F41.1 GENERALIZED ANXIETY DISORDER: ICD-10-CM

## 2023-08-18 DIAGNOSIS — I10 HYPERTENSION, ESSENTIAL, BENIGN: Primary | ICD-10-CM

## 2023-08-18 DIAGNOSIS — Z11.4 SCREENING FOR HIV (HUMAN IMMUNODEFICIENCY VIRUS): ICD-10-CM

## 2023-08-18 DIAGNOSIS — E78.00 HYPERCHOLESTEROLEMIA: ICD-10-CM

## 2023-08-18 PROCEDURE — G8427 DOCREV CUR MEDS BY ELIG CLIN: HCPCS | Performed by: INTERNAL MEDICINE

## 2023-08-18 PROCEDURE — 3017F COLORECTAL CA SCREEN DOC REV: CPT | Performed by: INTERNAL MEDICINE

## 2023-08-18 PROCEDURE — 1090F PRES/ABSN URINE INCON ASSESS: CPT | Performed by: INTERNAL MEDICINE

## 2023-08-18 PROCEDURE — 1036F TOBACCO NON-USER: CPT | Performed by: INTERNAL MEDICINE

## 2023-08-18 PROCEDURE — 99214 OFFICE O/P EST MOD 30 MIN: CPT | Performed by: INTERNAL MEDICINE

## 2023-08-18 PROCEDURE — G8417 CALC BMI ABV UP PARAM F/U: HCPCS | Performed by: INTERNAL MEDICINE

## 2023-08-18 PROCEDURE — 3079F DIAST BP 80-89 MM HG: CPT | Performed by: INTERNAL MEDICINE

## 2023-08-18 PROCEDURE — 3074F SYST BP LT 130 MM HG: CPT | Performed by: INTERNAL MEDICINE

## 2023-08-18 PROCEDURE — 1123F ACP DISCUSS/DSCN MKR DOCD: CPT | Performed by: INTERNAL MEDICINE

## 2023-08-18 PROCEDURE — G8400 PT W/DXA NO RESULTS DOC: HCPCS | Performed by: INTERNAL MEDICINE

## 2023-08-18 RX ORDER — PHENDIMETRAZINE TARTRATE 35 MG/1
TABLET ORAL
COMMUNITY
Start: 2023-08-03

## 2023-08-18 NOTE — PROGRESS NOTES
Chief Complaint   Patient presents with    Hypertension    Weight Management     3B       HISTORY OF PRESENT ILLNESS  Nancy Garcia is a 70 y.o. female    Presents for 4 month follow up evaluation. Newly diagnosed with HTN at last visit and started on metoprolol XL 25 mg daily. Tolerating well. Staying on low-salt diet. Denies headaches, CP, SOB, dizziness, heart palpitations, muscle cramps, or leg swelling. Her insurance covered Wegovy for only 1 month. Then prescribed phentermine; did not lose much weight on it. She joined [employer] 2 weeks ago. Started on phendimetrazine because less likely to affect BP compared to phentermine. Still having bilateral knee pain. Needs to lose weight before having bilateral knee replacements with Dr. Gillian Albright. Sleeps well. Takes trazodone 50 mg 2 tabs nightly. Has Medicare that started in June 2023. Shingles vaccine: declined because it made family members ill. Patient Active Problem List   Diagnosis    Hypertension, essential, benign    Bilateral carpal tunnel syndrome    Seasonal allergic rhinitis    Hypercholesterolemia    Current moderate episode of major depressive disorder without prior episode (HCC)    Prediabetes    Primary osteoarthritis of both knees    Morbid obesity with BMI of 45.0-49.9, adult (HCC)    Generalized anxiety disorder     Past Medical History:   Diagnosis Date    Arthritis     Knees    Environmental allergies     Hives     HTN (hypertension) 5/20/2013    Hypercholesterolemia      Allergies   Allergen Reactions    Amlodipine Angioedema    Other      Shrimp        Current Outpatient Medications   Medication Sig Dispense Refill    Needle, Disp, 31G X 5/16\" MISC Use as directed with Wegovy pens.       Phendimetrazine Tartrate 35 MG TABS       metoprolol succinate (TOPROL XL) 25 MG extended release tablet Take 1 tablet by mouth daily 90 tablet 1    albuterol sulfate HFA (PROVENTIL;VENTOLIN;PROAIR) 108 (90 Base) MCG/ACT inhaler INHALE 2

## 2023-08-19 LAB
ALBUMIN SERPL-MCNC: 3.9 G/DL (ref 3.5–5)
ALBUMIN/GLOB SERPL: 1.4 (ref 1.1–2.2)
ALP SERPL-CCNC: 80 U/L (ref 45–117)
ALT SERPL-CCNC: 27 U/L (ref 12–78)
ANION GAP SERPL CALC-SCNC: 5 MMOL/L (ref 5–15)
AST SERPL-CCNC: 15 U/L (ref 15–37)
BASOPHILS # BLD: 0.1 K/UL (ref 0–0.1)
BASOPHILS NFR BLD: 1 % (ref 0–1)
BILIRUB SERPL-MCNC: 0.4 MG/DL (ref 0.2–1)
BUN SERPL-MCNC: 20 MG/DL (ref 6–20)
BUN/CREAT SERPL: 21 (ref 12–20)
CALCIUM SERPL-MCNC: 9.7 MG/DL (ref 8.5–10.1)
CHLORIDE SERPL-SCNC: 101 MMOL/L (ref 97–108)
CHOLEST SERPL-MCNC: 142 MG/DL
CO2 SERPL-SCNC: 33 MMOL/L (ref 21–32)
CREAT SERPL-MCNC: 0.94 MG/DL (ref 0.55–1.02)
DIFFERENTIAL METHOD BLD: ABNORMAL
EOSINOPHIL # BLD: 0.4 K/UL (ref 0–0.4)
EOSINOPHIL NFR BLD: 4 % (ref 0–7)
ERYTHROCYTE [DISTWIDTH] IN BLOOD BY AUTOMATED COUNT: 14.1 % (ref 11.5–14.5)
EST. AVERAGE GLUCOSE BLD GHB EST-MCNC: 120 MG/DL
GLOBULIN SER CALC-MCNC: 2.8 G/DL (ref 2–4)
GLUCOSE SERPL-MCNC: 105 MG/DL (ref 65–100)
HBA1C MFR BLD: 5.8 % (ref 4–5.6)
HCT VFR BLD AUTO: 39.3 % (ref 35–47)
HDLC SERPL-MCNC: 51 MG/DL
HDLC SERPL: 2.8 (ref 0–5)
HGB BLD-MCNC: 12 G/DL (ref 11.5–16)
HIV 1+2 AB+HIV1 P24 AG SERPL QL IA: NONREACTIVE
HIV 1/2 RESULT COMMENT: NORMAL
IMM GRANULOCYTES # BLD AUTO: 0 K/UL (ref 0–0.04)
IMM GRANULOCYTES NFR BLD AUTO: 0 % (ref 0–0.5)
LDLC SERPL CALC-MCNC: 71.8 MG/DL (ref 0–100)
LYMPHOCYTES # BLD: 2.2 K/UL (ref 0.8–3.5)
LYMPHOCYTES NFR BLD: 23 % (ref 12–49)
MCH RBC QN AUTO: 25.5 PG (ref 26–34)
MCHC RBC AUTO-ENTMCNC: 30.5 G/DL (ref 30–36.5)
MCV RBC AUTO: 83.4 FL (ref 80–99)
MONOCYTES # BLD: 0.6 K/UL (ref 0–1)
MONOCYTES NFR BLD: 7 % (ref 5–13)
NEUTS SEG # BLD: 6.2 K/UL (ref 1.8–8)
NEUTS SEG NFR BLD: 65 % (ref 32–75)
NRBC # BLD: 0 K/UL (ref 0–0.01)
NRBC BLD-RTO: 0 PER 100 WBC
PLATELET # BLD AUTO: 340 K/UL (ref 150–400)
PMV BLD AUTO: 9.3 FL (ref 8.9–12.9)
POTASSIUM SERPL-SCNC: 4.1 MMOL/L (ref 3.5–5.1)
PROT SERPL-MCNC: 6.7 G/DL (ref 6.4–8.2)
RBC # BLD AUTO: 4.71 M/UL (ref 3.8–5.2)
SODIUM SERPL-SCNC: 139 MMOL/L (ref 136–145)
TRIGL SERPL-MCNC: 96 MG/DL
VLDLC SERPL CALC-MCNC: 19.2 MG/DL
WBC # BLD AUTO: 9.5 K/UL (ref 3.6–11)

## 2023-09-28 DIAGNOSIS — F32.1 CURRENT MODERATE EPISODE OF MAJOR DEPRESSIVE DISORDER WITHOUT PRIOR EPISODE (HCC): ICD-10-CM

## 2023-09-28 DIAGNOSIS — F41.1 GENERALIZED ANXIETY DISORDER: ICD-10-CM

## 2023-09-28 DIAGNOSIS — F51.04 CHRONIC INSOMNIA: ICD-10-CM

## 2023-09-28 DIAGNOSIS — E78.00 HYPERCHOLESTEROLEMIA: ICD-10-CM

## 2023-09-28 DIAGNOSIS — I10 HYPERTENSION, ESSENTIAL, BENIGN: Primary | ICD-10-CM

## 2023-09-28 RX ORDER — DULOXETIN HYDROCHLORIDE 60 MG/1
60 CAPSULE, DELAYED RELEASE ORAL 2 TIMES DAILY
Qty: 180 CAPSULE | Refills: 1 | Status: SHIPPED | OUTPATIENT
Start: 2023-09-28

## 2023-09-28 RX ORDER — PRAVASTATIN SODIUM 20 MG
20 TABLET ORAL NIGHTLY
Qty: 90 TABLET | Refills: 3 | Status: SHIPPED | OUTPATIENT
Start: 2023-09-28

## 2023-09-28 RX ORDER — TRAZODONE HYDROCHLORIDE 50 MG/1
100 TABLET ORAL NIGHTLY
Qty: 60 TABLET | Refills: 3 | Status: CANCELLED | OUTPATIENT
Start: 2023-09-28

## 2023-09-28 RX ORDER — LISINOPRIL AND HYDROCHLOROTHIAZIDE 20; 12.5 MG/1; MG/1
2 TABLET ORAL DAILY
Qty: 90 TABLET | Refills: 3 | Status: SHIPPED | OUTPATIENT
Start: 2023-09-28

## 2023-09-28 RX ORDER — TRAZODONE HYDROCHLORIDE 50 MG/1
100 TABLET ORAL NIGHTLY
Qty: 180 TABLET | Refills: 1 | Status: SHIPPED | OUTPATIENT
Start: 2023-09-28

## 2023-09-28 NOTE — TELEPHONE ENCOUNTER
PCP: Chance Norton MD     Last appt:  8/18/2023    Future Appointments   Date Time Provider [address]   2/26/2024  9:10 AM Chance Norton MD [facility]          Requested Prescriptions     Pending Prescriptions Disp Refills    traZODone (DESYREL) 50 MG tablet 60 tablet 3     Sig: Take 2 tablets by mouth nightly    lisinopril-hydroCHLOROthiazide (PRINZIDE;ZESTORETIC) 20-12.5 MG per tablet 60 tablet 3     Sig: Take 2 tablets by mouth daily    pravastatin (PRAVACHOL) 20 MG tablet 90 tablet 0     Sig: Take 1 tablet by mouth nightly    DULoxetine (CYMBALTA) 60 MG extended release capsule 30 capsule 3     Sig: Take 1 capsule by mouth 2 times daily

## 2023-09-28 NOTE — TELEPHONE ENCOUNTER
lisinopril-hydroCHLOROthiazide (PRINZIDE;ZESTORETIC) 20-12.5 MG per tablet    traZODone (DESYREL) 50 MG tablet    pravastatin (PRAVACHOL) 20 MG tablet    DULoxetine (CYMBALTA) 60 MG extended release capsule

## 2023-09-29 ENCOUNTER — TELEPHONE (OUTPATIENT)
Facility: CLINIC | Age: 63
End: 2023-09-29

## 2023-09-29 NOTE — TELEPHONE ENCOUNTER
----- Message from [address] [facility]. Dino Calvert sent at 9/28/2023 12:07 PM EDT -----  Regarding: prescription refills  Contact: [phone]  I have been trying to  my prescriptions, but the pharmacy just notified me that they sent the refill requests 9/23 and have not heard back from you regards to my refills for LISINOPRIL, TRAZODONE, PREVASTATIN AND DULOXETINE. This is Urgent request as I have not had LISINOPRIL for 2 days.

## 2024-01-03 ENCOUNTER — OFFICE VISIT (OUTPATIENT)
Facility: CLINIC | Age: 64
End: 2024-01-03
Payer: MEDICARE

## 2024-01-03 ENCOUNTER — TELEPHONE (OUTPATIENT)
Facility: CLINIC | Age: 64
End: 2024-01-03

## 2024-01-03 DIAGNOSIS — U07.1 COVID: Primary | ICD-10-CM

## 2024-01-03 PROCEDURE — 99213 OFFICE O/P EST LOW 20 MIN: CPT | Performed by: PHYSICIAN ASSISTANT

## 2024-01-03 PROCEDURE — 3017F COLORECTAL CA SCREEN DOC REV: CPT | Performed by: PHYSICIAN ASSISTANT

## 2024-01-03 PROCEDURE — 1123F ACP DISCUSS/DSCN MKR DOCD: CPT | Performed by: PHYSICIAN ASSISTANT

## 2024-01-03 PROCEDURE — G8427 DOCREV CUR MEDS BY ELIG CLIN: HCPCS | Performed by: PHYSICIAN ASSISTANT

## 2024-01-03 PROCEDURE — 1090F PRES/ABSN URINE INCON ASSESS: CPT | Performed by: PHYSICIAN ASSISTANT

## 2024-01-03 PROCEDURE — G8400 PT W/DXA NO RESULTS DOC: HCPCS | Performed by: PHYSICIAN ASSISTANT

## 2024-01-03 ASSESSMENT — PATIENT HEALTH QUESTIONNAIRE - PHQ9
1. LITTLE INTEREST OR PLEASURE IN DOING THINGS: 0
3. TROUBLE FALLING OR STAYING ASLEEP: 0
SUM OF ALL RESPONSES TO PHQ QUESTIONS 1-9: 0
SUM OF ALL RESPONSES TO PHQ QUESTIONS 1-9: 0
SUM OF ALL RESPONSES TO PHQ9 QUESTIONS 1 & 2: 0
7. TROUBLE CONCENTRATING ON THINGS, SUCH AS READING THE NEWSPAPER OR WATCHING TELEVISION: 0
SUM OF ALL RESPONSES TO PHQ QUESTIONS 1-9: 0
8. MOVING OR SPEAKING SO SLOWLY THAT OTHER PEOPLE COULD HAVE NOTICED. OR THE OPPOSITE, BEING SO FIGETY OR RESTLESS THAT YOU HAVE BEEN MOVING AROUND A LOT MORE THAN USUAL: 0
2. FEELING DOWN, DEPRESSED OR HOPELESS: 0
9. THOUGHTS THAT YOU WOULD BE BETTER OFF DEAD, OR OF HURTING YOURSELF: 0
5. POOR APPETITE OR OVEREATING: 0
SUM OF ALL RESPONSES TO PHQ QUESTIONS 1-9: 0
4. FEELING TIRED OR HAVING LITTLE ENERGY: 0
10. IF YOU CHECKED OFF ANY PROBLEMS, HOW DIFFICULT HAVE THESE PROBLEMS MADE IT FOR YOU TO DO YOUR WORK, TAKE CARE OF THINGS AT HOME, OR GET ALONG WITH OTHER PEOPLE: 0
6. FEELING BAD ABOUT YOURSELF - OR THAT YOU ARE A FAILURE OR HAVE LET YOURSELF OR YOUR FAMILY DOWN: 0

## 2024-01-03 ASSESSMENT — ENCOUNTER SYMPTOMS
NAUSEA: 0
SORE THROAT: 1
DIARRHEA: 0
COUGH: 1
SHORTNESS OF BREATH: 0
VOMITING: 0
EYES NEGATIVE: 1
RHINORRHEA: 0
SINUS PRESSURE: 0
SINUS PAIN: 1

## 2024-01-03 ASSESSMENT — PAIN SCALES - GENERAL: PAINLEVEL_OUTOF10: 0

## 2024-01-03 NOTE — TELEPHONE ENCOUNTER
I called the patient and verified them by name and date of birth. I scheduled her for a virtual appointment to speak with Savannah

## 2024-01-03 NOTE — PROGRESS NOTES
1/3/2024    TELEHEALTH EVALUATION -- Audio/Visual    HPI:    Fallon Dowd (:  1960) has requested an audio/video evaluation for the following concern(s):    Fevers x 3 days with 2 positive home COVID tests. Has had headaches, sinus and chest congestion with ear fullness and sore throat. Has had fevers of 100-101. Does not feels symptoms are getting better.     Review of Systems   Constitutional:  Positive for fever. Negative for chills.   HENT:  Positive for congestion, ear pain, sinus pain and sore throat. Negative for postnasal drip, rhinorrhea and sinus pressure.    Eyes: Negative.    Respiratory:  Positive for cough. Negative for shortness of breath.    Cardiovascular:  Negative for chest pain.   Gastrointestinal:  Negative for diarrhea, nausea and vomiting.   Genitourinary: Negative.    Musculoskeletal:  Negative for arthralgias and myalgias.   Skin:  Negative for rash.   Neurological: Negative.  Negative for headaches.   All other systems reviewed and are negative.      Prior to Visit Medications    Medication Sig Taking? Authorizing Provider   nirmatrelvir/ritonavir 300/100 (PAXLOVID) 20 x 150 MG & 10 x 100MG TBPK Take 3 tablets (two 150 mg nirmatrelvir and one 100 mg ritonavir tablets) by mouth every 12 hours for 5 days. Yes Alvin Medeiros PA-C   lisinopril-hydroCHLOROthiazide (PRINZIDE;ZESTORETIC) 20-12.5 MG per tablet Take 2 tablets by mouth daily Yes Awa Betancourt MD   pravastatin (PRAVACHOL) 20 MG tablet Take 1 tablet by mouth nightly Yes Awa Betancourt MD   DULoxetine (CYMBALTA) 60 MG extended release capsule Take 1 capsule by mouth 2 times daily Yes Awa Betancourt MD   traZODone (DESYREL) 50 MG tablet Take 2 tablets by mouth nightly Yes Awa Betancourt MD   Phendimetrazine Tartrate 35 MG TABS  Yes ProviderLuisana MD   metoprolol succinate (TOPROL XL) 25 MG extended release tablet Take 1 tablet by mouth daily Yes Pamela Correa MD   albuterol sulfate HFA

## 2024-01-03 NOTE — PROGRESS NOTES
Fallon Dowd  Identified pt with two pt identifiers(name and ).     Chief Complaint   Patient presents with    Positive For Covid-19     X yesterday        Reviewed record In preparation for visit and have obtained necessary documentation.     1. Have you been to the ER, urgent care clinic or hospitalized since your last visit? No     2. Have you seen or consulted any other health care providers outside of the [facility] since your last visit? Include any pap smears or colon screening. No    Patient has an advance directive.     Vitals reviewed with provider.    Health Maintenance reviewed:     Health Maintenance Due   Topic    DEXA (modify frequency per FRAX score)     Respiratory Syncytial Virus (RSV) Pregnant or age 60 yrs+ (1 - 1-dose 60+ series)    Pneumococcal 65+ years Vaccine (1 - PCV)    Flu vaccine (1)    COVID-19 Vaccine ( -  season)    Annual Wellness Visit (Medicare)           Wt Readings from Last 3 Encounters:   10/09/23 132 kg (291 lb)   23 132 kg (291 lb)   23 135.6 kg (299 lb)        Temp Readings from Last 3 Encounters:   23 98.2 °F (36.8 °C) (Oral)        BP Readings from Last 3 Encounters:   23 125/85   23 (!) 140/84   10/25/22 138/86        Pulse Readings from Last 3 Encounters:   23 62   23 68   10/25/22 74             No data to display                  Learning Assessment:   :         1/3/2024     1:00 PM   [facility] AMB LEARNING ASSESSMENT   Primary Learner Patient   level of education 2 YEARS OF COLLEGE   Barriers Factors NONE   Primary Language ENGLISH   Learning Preference DEMONSTRATION   Answered By Patient   Relationship to Learner SELF         Fall Risk Assessment:         2023     9:31 AM   Amb Fall Risk Assessment and TUG Test   Do you feel unsteady or are you worried about falling?  no   2 or more falls in past year? no   Fall with injury in past year? no         Abuse Screening:          No data to display

## 2024-01-03 NOTE — TELEPHONE ENCOUNTER
Pt stated that she tested positive for covid yesterday and wanted to know what she can do for it or if she can have something called in. Pt stated that she has a fever ranging from 100-101, really bad congestion, extreme sore throat, started in her head now feels like going in her chest

## 2024-01-15 RX ORDER — METOPROLOL SUCCINATE 25 MG/1
25 TABLET, EXTENDED RELEASE ORAL DAILY
Qty: 90 TABLET | Refills: 2 | Status: SHIPPED | OUTPATIENT
Start: 2024-01-15

## 2024-01-15 NOTE — TELEPHONE ENCOUNTER
PCP: Awa Betancourt MD     Last appt: 1/3/2024    Future Appointments   Date Time Provider Department Center   2/26/2024  9:10 AM Awa Betancourt MD [facility] BS AMB          Requested Prescriptions     Pending Prescriptions Disp Refills    metoprolol succinate (TOPROL XL) 25 MG extended release tablet 90 tablet 2     Sig: Take 1 tablet by mouth daily

## 2024-02-23 SDOH — HEALTH STABILITY: PHYSICAL HEALTH: ON AVERAGE, HOW MANY DAYS PER WEEK DO YOU ENGAGE IN MODERATE TO STRENUOUS EXERCISE (LIKE A BRISK WALK)?: 0 DAYS

## 2024-02-23 ASSESSMENT — LIFESTYLE VARIABLES
HOW OFTEN DO YOU HAVE SIX OR MORE DRINKS ON ONE OCCASION: 1
HOW MANY STANDARD DRINKS CONTAINING ALCOHOL DO YOU HAVE ON A TYPICAL DAY: PATIENT DOES NOT DRINK
HOW MANY STANDARD DRINKS CONTAINING ALCOHOL DO YOU HAVE ON A TYPICAL DAY: 0
HOW OFTEN DO YOU HAVE A DRINK CONTAINING ALCOHOL: 1

## 2024-02-23 ASSESSMENT — PATIENT HEALTH QUESTIONNAIRE - PHQ9
SUM OF ALL RESPONSES TO PHQ QUESTIONS 1-9: 1
2. FEELING DOWN, DEPRESSED OR HOPELESS: 0
SUM OF ALL RESPONSES TO PHQ9 QUESTIONS 1 & 2: 1
1. LITTLE INTEREST OR PLEASURE IN DOING THINGS: 1

## 2024-02-26 ENCOUNTER — OFFICE VISIT (OUTPATIENT)
Facility: CLINIC | Age: 64
End: 2024-02-26
Payer: MEDICARE

## 2024-02-26 VITALS
SYSTOLIC BLOOD PRESSURE: 160 MMHG | OXYGEN SATURATION: 97 % | HEIGHT: 64 IN | DIASTOLIC BLOOD PRESSURE: 92 MMHG | TEMPERATURE: 98 F | RESPIRATION RATE: 18 BRPM | HEART RATE: 70 BPM | BODY MASS INDEX: 48.42 KG/M2 | WEIGHT: 283.6 LBS

## 2024-02-26 DIAGNOSIS — F32.1 CURRENT MODERATE EPISODE OF MAJOR DEPRESSIVE DISORDER WITHOUT PRIOR EPISODE (HCC): ICD-10-CM

## 2024-02-26 DIAGNOSIS — R73.03 PREDIABETES: ICD-10-CM

## 2024-02-26 DIAGNOSIS — I10 HYPERTENSION, ESSENTIAL, BENIGN: ICD-10-CM

## 2024-02-26 DIAGNOSIS — Z00.00 MEDICARE ANNUAL WELLNESS VISIT, SUBSEQUENT: Primary | ICD-10-CM

## 2024-02-26 DIAGNOSIS — E55.9 VITAMIN D DEFICIENCY: ICD-10-CM

## 2024-02-26 DIAGNOSIS — E66.01 MORBID OBESITY WITH BMI OF 45.0-49.9, ADULT (HCC): ICD-10-CM

## 2024-02-26 DIAGNOSIS — E78.00 HYPERCHOLESTEROLEMIA: ICD-10-CM

## 2024-02-26 DIAGNOSIS — M17.0 PRIMARY OSTEOARTHRITIS OF BOTH KNEES: ICD-10-CM

## 2024-02-26 PROCEDURE — 99214 OFFICE O/P EST MOD 30 MIN: CPT | Performed by: INTERNAL MEDICINE

## 2024-02-26 PROCEDURE — 3077F SYST BP >= 140 MM HG: CPT | Performed by: INTERNAL MEDICINE

## 2024-02-26 PROCEDURE — G8427 DOCREV CUR MEDS BY ELIG CLIN: HCPCS | Performed by: INTERNAL MEDICINE

## 2024-02-26 PROCEDURE — G0439 PPPS, SUBSEQ VISIT: HCPCS | Performed by: INTERNAL MEDICINE

## 2024-02-26 PROCEDURE — G8417 CALC BMI ABV UP PARAM F/U: HCPCS | Performed by: INTERNAL MEDICINE

## 2024-02-26 PROCEDURE — G8484 FLU IMMUNIZE NO ADMIN: HCPCS | Performed by: INTERNAL MEDICINE

## 2024-02-26 PROCEDURE — G8400 PT W/DXA NO RESULTS DOC: HCPCS | Performed by: INTERNAL MEDICINE

## 2024-02-26 PROCEDURE — 1036F TOBACCO NON-USER: CPT | Performed by: INTERNAL MEDICINE

## 2024-02-26 PROCEDURE — 1123F ACP DISCUSS/DSCN MKR DOCD: CPT | Performed by: INTERNAL MEDICINE

## 2024-02-26 PROCEDURE — 1090F PRES/ABSN URINE INCON ASSESS: CPT | Performed by: INTERNAL MEDICINE

## 2024-02-26 PROCEDURE — 3079F DIAST BP 80-89 MM HG: CPT | Performed by: INTERNAL MEDICINE

## 2024-02-26 PROCEDURE — 3017F COLORECTAL CA SCREEN DOC REV: CPT | Performed by: INTERNAL MEDICINE

## 2024-02-26 RX ORDER — HYDRALAZINE HYDROCHLORIDE 25 MG/1
25 TABLET, FILM COATED ORAL 3 TIMES DAILY
Qty: 90 TABLET | Refills: 3 | Status: SHIPPED | OUTPATIENT
Start: 2024-02-26

## 2024-02-26 RX ORDER — TIRZEPATIDE 2.5 MG/.5ML
0.5 INJECTION, SOLUTION SUBCUTANEOUS
Qty: 2 ML | Refills: 0 | Status: SHIPPED | OUTPATIENT
Start: 2024-02-26

## 2024-02-26 NOTE — PROGRESS NOTES
Rm    Chief Complaint   Patient presents with    Medicare AWV        BP (!) 160/92 (Site: Right Upper Arm)   Pulse 70   Temp 98 °F (36.7 °C)   Resp 18   Ht 1.626 m (5' 4\")   Wt 128.6 kg (283 lb 9.6 oz)   SpO2 97%   BMI 48.68 kg/m²      1. Have you been to the ER, urgent care clinic since your last visit?  Hospitalized since your last visit? no    2. Have you seen or consulted any other health care providers outside of the [facility] since your last visit?  Include any pap smears or colon screening.  no    Health Maintenance Due   Topic Date Due    DEXA (modify frequency per FRAX score)  Never done    Annual Wellness Visit (Medicare)  Never done             No data to display                 Failed to redirect to the Timeline version of the [employer] SmartLink.    Failed to redirect to the Timeline version of the [employer] SmartLink.     \"Have you been to the ER, urgent care clinic since your last visit?  Hospitalized since your last visit?\"     no    “Have you seen or consulted any other health care providers outside of [facility] since your last visit?”     no                 
°C)    SpO2: 97%    Weight: 128.6 kg (283 lb 9.6 oz)    Height: 1.626 m (5' 4\")       Body mass index is 48.68 kg/m². 8 lb weight loss since last clinic visit 6 months ago       General: Morbidly obese, no distress.  HEENT:  Head normocephalic/atraumatic, no scleral icterus  Neck: Supple. No carotid bruits, JVD, lymphadenopathy, or thyromegaly.  Lungs:  Clear to auscultation bilaterally. Good air movement.  Heart:  Regular rate and rhythm, normal S1 and S2, no murmur, gallop, or rub  Extremities: No clubbing, cyanosis, or edema. Normal ROM with crepitus at both knees.   Neurological: Alert and oriented. Non-focal exam.  Psychiatric: Normal mood and affect. Behavior is normal.          Allergies   Allergen Reactions    Amlodipine Angioedema    Other      Shrimp      Prior to Visit Medications    Medication Sig Taking? Authorizing Provider   metoprolol succinate (TOPROL XL) 25 MG extended release tablet Take 1 tablet by mouth daily  Awa Betancourt MD   lisinopril-hydroCHLOROthiazide (PRINZIDE;ZESTORETIC) 20-12.5 MG per tablet Take 2 tablets by mouth daily  Awa Betancourt MD   pravastatin (PRAVACHOL) 20 MG tablet Take 1 tablet by mouth nightly  Awa Betancourt MD   DULoxetine (CYMBALTA) 60 MG extended release capsule Take 1 capsule by mouth 2 times daily  Awa Betancourt MD   traZODone (DESYREL) 50 MG tablet Take 2 tablets by mouth nightly  Awa Betancourt MD   Phendimetrazine Tartrate 35 MG TABS   Provider, MD Luisana   albuterol sulfate HFA (PROVENTIL;VENTOLIN;PROAIR) 108 (90 Base) MCG/ACT inhaler INHALE 2 PUFFS BY MOUTH EVERY 6 HOURS AS NEEDED FOR WHEEZING  Automatic Reconciliation, Ar   Cetirizine HCl (ZYRTEC ALLERGY) 10 MG CAPS Take by mouth  Automatic Reconciliation, Ar   naproxen sodium (ANAPROX) 220 MG tablet Take 1 tablet by mouth daily  Automatic Reconciliation, Ar       CareTeam (Including outside providers/suppliers regularly involved in providing care):   Patient Care Team:  Awa Betancourt MD as PCP

## 2024-02-26 NOTE — PATIENT INSTRUCTIONS
a variety of fruit and vegetables every day. Dark green, deep orange, red, or yellow fruits and vegetables are especially good for you. Examples include spinach, carrots, peaches, and berries.     Foods high in fiber can reduce your cholesterol and provide important vitamins and minerals. High-fiber foods include whole-grain cereals and breads, oatmeal, beans, brown rice, citrus fruits, and apples.     Eat lean proteins. Heart-healthy proteins include seafood, lean meats and poultry, eggs, beans, peas, nuts, seeds, and soy products.     Limit drinks and foods with added sugar. These include candy, desserts, and soda pop.   Lifestyle changes    If your doctor recommends it, get more exercise. Walking is a good choice. Bit by bit, increase the amount you walk every day. Try for at least 30 minutes on most days of the week. You also may want to swim, bike, or do other activities.     Do not smoke. If you need help quitting, talk to your doctor about stop-smoking programs and medicines. These can increase your chances of quitting for good. Quitting smoking may be the most important step you can take to protect your heart. It is never too late to quit.     Limit alcohol to 2 drinks a day for men and 1 drink a day for women. Too much alcohol can cause health problems.     Manage other health problems such as diabetes, high blood pressure, and high cholesterol. If you think you may have a problem with alcohol or drug use, talk to your doctor.   Medicines    Take your medicines exactly as prescribed. Call your doctor if you think you are having a problem with your medicine.     If your doctor recommends aspirin, take the amount directed each day. Make sure you take aspirin and not another kind of pain reliever, such as acetaminophen (Tylenol).   When should you call for help?   Call 911 if you have symptoms of a heart attack. These may include:    Chest pain or pressure, or a strange feeling in the chest.     Sweating.

## 2024-03-22 DIAGNOSIS — F51.04 CHRONIC INSOMNIA: ICD-10-CM

## 2024-03-22 DIAGNOSIS — F41.1 GENERALIZED ANXIETY DISORDER: ICD-10-CM

## 2024-03-22 DIAGNOSIS — I10 HYPERTENSION, ESSENTIAL, BENIGN: ICD-10-CM

## 2024-03-22 DIAGNOSIS — F32.1 CURRENT MODERATE EPISODE OF MAJOR DEPRESSIVE DISORDER WITHOUT PRIOR EPISODE (HCC): ICD-10-CM

## 2024-03-22 NOTE — TELEPHONE ENCOUNTER
PCP: Awa Betancourt MD     Last appt:  2/26/2024      Future Appointments   Date Time Provider Department Center   6/10/2024  8:30 AM Awa Betancourt MD [facility] AMB          Requested Prescriptions     Pending Prescriptions Disp Refills    lisinopril-hydroCHLOROthiazide (PRINZIDE;ZESTORETIC) 20-12.5 MG per tablet 90 tablet 3     Sig: Take 2 tablets by mouth daily    traZODone (DESYREL) 50 MG tablet 180 tablet 1     Sig: Take 2 tablets by mouth nightly    DULoxetine (CYMBALTA) 60 MG extended release capsule 180 capsule 1     Sig: Take 1 capsule by mouth 2 times daily

## 2024-03-23 RX ORDER — TRAZODONE HYDROCHLORIDE 50 MG/1
100 TABLET ORAL NIGHTLY
Qty: 180 TABLET | Refills: 1 | Status: SHIPPED | OUTPATIENT
Start: 2024-03-23

## 2024-03-23 RX ORDER — LISINOPRIL AND HYDROCHLOROTHIAZIDE 20; 12.5 MG/1; MG/1
2 TABLET ORAL DAILY
Qty: 90 TABLET | Refills: 3 | Status: SHIPPED | OUTPATIENT
Start: 2024-03-23

## 2024-03-23 RX ORDER — DULOXETIN HYDROCHLORIDE 60 MG/1
60 CAPSULE, DELAYED RELEASE ORAL 2 TIMES DAILY
Qty: 180 CAPSULE | Refills: 1 | Status: SHIPPED | OUTPATIENT
Start: 2024-03-23

## 2024-03-27 DIAGNOSIS — E66.01 MORBID OBESITY WITH BMI OF 45.0-49.9, ADULT (HCC): ICD-10-CM

## 2024-03-27 RX ORDER — TIRZEPATIDE 2.5 MG/.5ML
0.5 INJECTION, SOLUTION SUBCUTANEOUS
Qty: 2 ML | Refills: 0 | Status: CANCELLED | OUTPATIENT
Start: 2024-03-27

## 2024-03-27 RX ORDER — TIRZEPATIDE 5 MG/.5ML
5 INJECTION, SOLUTION SUBCUTANEOUS
Qty: 2 ML | Refills: 0 | Status: SHIPPED | OUTPATIENT
Start: 2024-03-27

## 2024-03-27 NOTE — TELEPHONE ENCOUNTER
PCP: Awa Betancourt MD     Last appt:  2/26/2024      Future Appointments   Date Time Provider Department Center   4/15/2024  1:50 PM Jadyn Villavicencio MD [facility] BS AMB   6/10/2024  8:30 AM Awa Betancourt MD [facility] BS AMB          Requested Prescriptions     Pending Prescriptions Disp Refills    Tirzepatide-Weight Management (ZEPBOUND) 2.5 MG/0.5ML SOAJ 2 mL 0     Sig: Inject 0.5 mLs into the skin every 7 days

## 2024-04-26 ENCOUNTER — OFFICE VISIT (OUTPATIENT)
Facility: CLINIC | Age: 64
End: 2024-04-26
Payer: MEDICARE

## 2024-04-26 VITALS
SYSTOLIC BLOOD PRESSURE: 125 MMHG | OXYGEN SATURATION: 96 % | WEIGHT: 276.4 LBS | DIASTOLIC BLOOD PRESSURE: 85 MMHG | HEIGHT: 64 IN | RESPIRATION RATE: 17 BRPM | HEART RATE: 90 BPM | TEMPERATURE: 97.4 F | BODY MASS INDEX: 47.19 KG/M2

## 2024-04-26 DIAGNOSIS — M77.32 HEEL SPUR, LEFT: ICD-10-CM

## 2024-04-26 DIAGNOSIS — S86.002D INJURY OF LEFT ACHILLES TENDON, SUBSEQUENT ENCOUNTER: ICD-10-CM

## 2024-04-26 DIAGNOSIS — R06.09 DYSPNEA ON EXERTION: ICD-10-CM

## 2024-04-26 DIAGNOSIS — Z01.818 PRE-OP EVALUATION: Primary | ICD-10-CM

## 2024-04-26 DIAGNOSIS — I10 HYPERTENSION, ESSENTIAL, BENIGN: ICD-10-CM

## 2024-04-26 PROCEDURE — 1090F PRES/ABSN URINE INCON ASSESS: CPT | Performed by: INTERNAL MEDICINE

## 2024-04-26 PROCEDURE — 1123F ACP DISCUSS/DSCN MKR DOCD: CPT | Performed by: INTERNAL MEDICINE

## 2024-04-26 PROCEDURE — 3017F COLORECTAL CA SCREEN DOC REV: CPT | Performed by: INTERNAL MEDICINE

## 2024-04-26 PROCEDURE — G8417 CALC BMI ABV UP PARAM F/U: HCPCS | Performed by: INTERNAL MEDICINE

## 2024-04-26 PROCEDURE — 3079F DIAST BP 80-89 MM HG: CPT | Performed by: INTERNAL MEDICINE

## 2024-04-26 PROCEDURE — 3074F SYST BP LT 130 MM HG: CPT | Performed by: INTERNAL MEDICINE

## 2024-04-26 PROCEDURE — 99214 OFFICE O/P EST MOD 30 MIN: CPT | Performed by: INTERNAL MEDICINE

## 2024-04-26 PROCEDURE — G8427 DOCREV CUR MEDS BY ELIG CLIN: HCPCS | Performed by: INTERNAL MEDICINE

## 2024-04-26 PROCEDURE — G8400 PT W/DXA NO RESULTS DOC: HCPCS | Performed by: INTERNAL MEDICINE

## 2024-04-26 PROCEDURE — 1036F TOBACCO NON-USER: CPT | Performed by: INTERNAL MEDICINE

## 2024-04-26 NOTE — PROGRESS NOTES
Chief Complaint   Patient presents with    Pre-op Exam       HISTORY OF PRESENT ILLNESS  Fallon Dowd is a 64 y.o. female    Presents for pre-operative consultation requested by Dr. Sandra Lindsay prior to left foot surgery scheduled on 5/16/24. She is to have surgery for repair of Achilles tendon and removal of a heel spur. She has HTN, OA of both knees, hypercholesterolemia, prediabetes, depression, anxiety, and morbid obesity. 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    Hx of Preoperative Complications  Anesthesia Reactions: no  Malignant Hypertension: yes with previous surgery  Bleeding excessively: no  Transfusion: no  DVT/PE Hx: no     Latex allergy: no     Physical Activity Capacity:   Greater than 4 METS (e.g., walking > 4 mph, 1 flight of stairs, moderate housework or exercise)     Medication Considerations:  Patient is not taking aspirin, warfarin, or other anticoagulant medication daily.    Soc Hx  . Has 3 children. Never smoker. Drinks alcohol rarely.      Primary Decision Maker: Phillip Dowd - Spouse - [phone]    Patient Active Problem List   Diagnosis    Hypertension, essential, benign    Bilateral carpal tunnel syndrome    Seasonal allergic rhinitis    Hypercholesterolemia    Current moderate episode of major depressive disorder without prior episode (HCC)    Prediabetes    Primary osteoarthritis of both knees    Morbid obesity with BMI of 45.0-49.9, adult (HCC)    Generalized anxiety disorder     Past Medical History:   Diagnosis Date    Arthritis     Knees    Environmental allergies     Hives     HTN (hypertension) 5/20/2013    Hypercholesterolemia      Past Surgical History:   Procedure Laterality Date    GYN      3 vaginal pregnancies, 3 children    OTHER SURGICAL HISTORY Right 2014    Achilles tendon repair     Allergies   Allergen Reactions    Amlodipine Angioedema    Other      Shrimp      Current Outpatient Medications

## 2024-04-26 NOTE — PROGRESS NOTES
Room:   I have reviewed all needed documentation in preparation for visit. Verified patient by name and date of birth  Fallon Dowd is a 64 y.o. female Pre-op Exam  . Surgery on Left Ankle on 5/16/2024.  Vitals:    04/26/24 1035   BP: 125/85   Pulse: 90   Resp: 17   Temp: 97.4 °F (36.3 °C)   SpO2: 96%       Patient has brought all pre-op forms.   Patient states that needs a new rx for Zepbound.     Health Maintenance Review: Patient reminded of \"due or due soon\" health maintenance. I have asked the patient to contact his/her primary care provider (PCP) for follow-up on his/her health maintenance.    \"Have you been to the ER, urgent care clinic since your last visit?  Hospitalized since your last visit?\"    NO    “Have you seen or consulted any other health care providers outside of [facility] since your last visit?”    NO

## 2024-04-29 DIAGNOSIS — I10 HYPERTENSION, ESSENTIAL, BENIGN: ICD-10-CM

## 2024-04-29 LAB
ALBUMIN SERPL-MCNC: 3.8 G/DL (ref 3.5–5)
ALBUMIN/GLOB SERPL: 1.3 (ref 1.1–2.2)
ALP SERPL-CCNC: 85 U/L (ref 45–117)
ALT SERPL-CCNC: 17 U/L (ref 12–78)
ANION GAP SERPL CALC-SCNC: 6 MMOL/L (ref 5–15)
AST SERPL-CCNC: 12 U/L (ref 15–37)
BASOPHILS # BLD: 0.1 K/UL (ref 0–0.1)
BASOPHILS NFR BLD: 1 % (ref 0–1)
BILIRUB SERPL-MCNC: 0.3 MG/DL (ref 0.2–1)
BUN SERPL-MCNC: 18 MG/DL (ref 6–20)
BUN/CREAT SERPL: 18 (ref 12–20)
CALCIUM SERPL-MCNC: 9.8 MG/DL (ref 8.5–10.1)
CHLORIDE SERPL-SCNC: 100 MMOL/L (ref 97–108)
CO2 SERPL-SCNC: 31 MMOL/L (ref 21–32)
CREAT SERPL-MCNC: 1 MG/DL (ref 0.55–1.02)
DIFFERENTIAL METHOD BLD: NORMAL
EOSINOPHIL # BLD: 0.4 K/UL (ref 0–0.4)
EOSINOPHIL NFR BLD: 5 % (ref 0–7)
ERYTHROCYTE [DISTWIDTH] IN BLOOD BY AUTOMATED COUNT: 14.4 % (ref 11.5–14.5)
GLOBULIN SER CALC-MCNC: 3 G/DL (ref 2–4)
GLUCOSE SERPL-MCNC: 95 MG/DL (ref 65–100)
HCT VFR BLD AUTO: 38.2 % (ref 35–47)
HGB BLD-MCNC: 12.2 G/DL (ref 11.5–16)
IMM GRANULOCYTES # BLD AUTO: 0 K/UL (ref 0–0.04)
IMM GRANULOCYTES NFR BLD AUTO: 0 % (ref 0–0.5)
LYMPHOCYTES # BLD: 2.3 K/UL (ref 0.8–3.5)
LYMPHOCYTES NFR BLD: 25 % (ref 12–49)
MCH RBC QN AUTO: 26 PG (ref 26–34)
MCHC RBC AUTO-ENTMCNC: 31.9 G/DL (ref 30–36.5)
MCV RBC AUTO: 81.3 FL (ref 80–99)
MONOCYTES # BLD: 0.6 K/UL (ref 0–1)
MONOCYTES NFR BLD: 7 % (ref 5–13)
NEUTS SEG # BLD: 5.7 K/UL (ref 1.8–8)
NEUTS SEG NFR BLD: 62 % (ref 32–75)
NRBC # BLD: 0 K/UL (ref 0–0.01)
NRBC BLD-RTO: 0 PER 100 WBC
PLATELET # BLD AUTO: 297 K/UL (ref 150–400)
PMV BLD AUTO: 9.5 FL (ref 8.9–12.9)
POTASSIUM SERPL-SCNC: 3.9 MMOL/L (ref 3.5–5.1)
PROT SERPL-MCNC: 6.8 G/DL (ref 6.4–8.2)
RBC # BLD AUTO: 4.7 M/UL (ref 3.8–5.2)
SODIUM SERPL-SCNC: 137 MMOL/L (ref 136–145)
WBC # BLD AUTO: 9.2 K/UL (ref 3.6–11)

## 2024-05-01 ENCOUNTER — HOSPITAL ENCOUNTER (OUTPATIENT)
Dept: NON INVASIVE DIAGNOSTICS | Facility: HOSPITAL | Age: 64
Discharge: HOME | End: 2024-05-04

## 2024-05-01 DIAGNOSIS — E66.01 MORBID OBESITY WITH BMI OF 45.0-49.9, ADULT (HCC): ICD-10-CM

## 2024-05-01 DIAGNOSIS — Z01.818 PRE-OP EVALUATION: ICD-10-CM

## 2024-05-01 DIAGNOSIS — I10 HYPERTENSION, ESSENTIAL, BENIGN: ICD-10-CM

## 2024-05-01 DIAGNOSIS — R06.09 DYSPNEA ON EXERTION: ICD-10-CM

## 2024-05-01 RX ORDER — TIRZEPATIDE 5 MG/.5ML
5 INJECTION, SOLUTION SUBCUTANEOUS
Qty: 2 ML | Refills: 0 | Status: CANCELLED | OUTPATIENT
Start: 2024-05-01

## 2024-05-02 LAB
EKG ATRIAL RATE: 74 BPM
EKG DIAGNOSIS: NORMAL
EKG P AXIS: 70 DEGREES
EKG P-R INTERVAL: 170 MS
EKG Q-T INTERVAL: 384 MS
EKG QRS DURATION: 70 MS
EKG QTC CALCULATION (BAZETT): 426 MS
EKG R AXIS: 75 DEGREES
EKG T AXIS: 65 DEGREES
EKG VENTRICULAR RATE: 74 BPM

## 2024-05-03 RX ORDER — TIRZEPATIDE 7.5 MG/.5ML
0.5 INJECTION, SOLUTION SUBCUTANEOUS
Qty: 2 ML | Refills: 1 | Status: SHIPPED | OUTPATIENT
Start: 2024-05-03

## 2024-05-03 NOTE — TELEPHONE ENCOUNTER
PCP: Awa Betancourt MD     Last appt:  4/26/2024      Future Appointments   Date Time Provider Department Center   6/10/2024  8:30 AM Awa Betancourt MD [facility] BS AMB          Requested Prescriptions     Pending Prescriptions Disp Refills    Tirzepatide-Weight Management (ZEPBOUND) 5 MG/0.5ML SOAJ [Pharmacy Med Name: ZEPBOUND 5 MG/0.5 ML PEN] 2 mL

## 2024-05-04 RX ORDER — TIRZEPATIDE 5 MG/.5ML
INJECTION, SOLUTION SUBCUTANEOUS
Qty: 2 ML | OUTPATIENT
Start: 2024-05-04

## 2024-05-15 ENCOUNTER — ANESTHESIA EVENT (OUTPATIENT)
Facility: HOSPITAL | Age: 64
End: 2024-05-15
Payer: MEDICARE

## 2024-05-15 NOTE — PERIOP NOTE
5/15/24  (Pain medications not listed above, including Tylenol may be taken up until 4 hours prior to arrival time)   Blood  Thinners If you take Aspirin, Plavix, Coumadin, or any blood-thinning or anti-blood clot medicine, talk to the doctor who prescribed the medications for pre-operative instructions.  If you take aspirin or aspirin containing products for pain, stop 7 days prior to surgery   Bathing Clothing  Jewelry  Valuables     When you shower the morning of surgery, please do not apply anything to your skin (lotions, powders, deodorant (if having breast surgery), or makeup (especially mascara). Remove fingernail polish except for clear.  Follow Chlorhexidine body wash instructions provided to you during PAT appointment. The night before and morning of surgery.  Do not shave or trim anywhere 24 hours before surgery  Wear your hair loose or down; no pony-tails, buns, or metal hair clips  Wear loose, comfortable, clean clothes  Wear glasses instead of contacts. Bring a case to keep your glasses safe.  Leave money, valuables, and jewelry, including body piercings, at home  If you use inhalers or CPAP machine, bring them with you the day of surgery.     Going Home - or Spending the Night OUTPATIENT SURGERY: You must have a responsible adult drive you home and stay with you 24 hours after surgery. You may not drive for at least 24 hours after surgery.  ADMITS: If your doctor is keeping you in the hospital after surgery, leave personal belongings/luggage in your car until you have a hospital room number.    Hospital discharge time is 12 noon  Drivers must be here before 12 noon unless you are told differently   Special Instructions Free  parking available from 6 AM until 4:30 PM.    OTHER:FOLLOW ALL INSTRUCTIONS GIVEN BY YOUR SURGEON`S OFFICE  AND BRING ADVANCE DIRECTIVE PAPERS ON DAY OF SURGERY.       Preventing Infections Before and After - Your Surgery    IMPORTANT INSTRUCTIONS      You play an important

## 2024-05-16 ENCOUNTER — ANESTHESIA (OUTPATIENT)
Facility: HOSPITAL | Age: 64
End: 2024-05-16
Payer: MEDICARE

## 2024-05-16 ENCOUNTER — HOSPITAL ENCOUNTER (OUTPATIENT)
Facility: HOSPITAL | Age: 64
Setting detail: OUTPATIENT SURGERY
Discharge: HOME | End: 2024-05-16
Attending: PODIATRIST | Admitting: PODIATRIST
Payer: MEDICARE

## 2024-05-16 VITALS
DIASTOLIC BLOOD PRESSURE: 55 MMHG | WEIGHT: 270 LBS | HEART RATE: 79 BPM | RESPIRATION RATE: 15 BRPM | HEIGHT: 64 IN | TEMPERATURE: 98.4 F | BODY MASS INDEX: 46.1 KG/M2 | OXYGEN SATURATION: 100 % | SYSTOLIC BLOOD PRESSURE: 130 MMHG

## 2024-05-16 DIAGNOSIS — G89.18 POST-OP PAIN: Primary | ICD-10-CM

## 2024-05-16 PROCEDURE — 2580000003 HC RX 258: Performed by: STUDENT IN AN ORGANIZED HEALTH CARE EDUCATION/TRAINING PROGRAM

## 2024-05-16 PROCEDURE — 7100000001 HC PACU RECOVERY - ADDTL 15 MIN: Performed by: PODIATRIST

## 2024-05-16 PROCEDURE — 3700000001 HC ADD 15 MINUTES (ANESTHESIA): Performed by: PODIATRIST

## 2024-05-16 PROCEDURE — 3600000014 HC SURGERY LEVEL 4 ADDTL 15MIN: Performed by: PODIATRIST

## 2024-05-16 PROCEDURE — 2500000003 HC RX 250 WO HCPCS: Performed by: STUDENT IN AN ORGANIZED HEALTH CARE EDUCATION/TRAINING PROGRAM

## 2024-05-16 PROCEDURE — 2500000003 HC RX 250 WO HCPCS: Performed by: NURSE ANESTHETIST, CERTIFIED REGISTERED

## 2024-05-16 PROCEDURE — 6360000002 HC RX W HCPCS: Performed by: NURSE ANESTHETIST, CERTIFIED REGISTERED

## 2024-05-16 PROCEDURE — 6360000002 HC RX W HCPCS: Performed by: STUDENT IN AN ORGANIZED HEALTH CARE EDUCATION/TRAINING PROGRAM

## 2024-05-16 PROCEDURE — 7100000011 HC PHASE II RECOVERY - ADDTL 15 MIN: Performed by: PODIATRIST

## 2024-05-16 PROCEDURE — 2709999900 HC NON-CHARGEABLE SUPPLY: Performed by: PODIATRIST

## 2024-05-16 PROCEDURE — 2580000003 HC RX 258: Performed by: PODIATRIST

## 2024-05-16 PROCEDURE — 3700000000 HC ANESTHESIA ATTENDED CARE: Performed by: PODIATRIST

## 2024-05-16 PROCEDURE — 6360000002 HC RX W HCPCS: Performed by: PODIATRIST

## 2024-05-16 PROCEDURE — C1713 ANCHOR/SCREW BN/BN,TIS/BN: HCPCS | Performed by: PODIATRIST

## 2024-05-16 PROCEDURE — 2500000003 HC RX 250 WO HCPCS: Performed by: PODIATRIST

## 2024-05-16 PROCEDURE — 2720000010 HC SURG SUPPLY STERILE: Performed by: PODIATRIST

## 2024-05-16 PROCEDURE — 7100000000 HC PACU RECOVERY - FIRST 15 MIN: Performed by: PODIATRIST

## 2024-05-16 PROCEDURE — 3600000004 HC SURGERY LEVEL 4 BASE: Performed by: PODIATRIST

## 2024-05-16 PROCEDURE — 6370000000 HC RX 637 (ALT 250 FOR IP): Performed by: STUDENT IN AN ORGANIZED HEALTH CARE EDUCATION/TRAINING PROGRAM

## 2024-05-16 PROCEDURE — 7100000010 HC PHASE II RECOVERY - FIRST 15 MIN: Performed by: PODIATRIST

## 2024-05-16 DEVICE — IMPLANTABLE DEVICE
Type: IMPLANTABLE DEVICE | Site: FOOT | Status: FUNCTIONAL
Brand: QUATTRO® X SUTURE ANCHOR

## 2024-05-16 RX ORDER — HYDROMORPHONE HYDROCHLORIDE 2 MG/ML
INJECTION, SOLUTION INTRAMUSCULAR; INTRAVENOUS; SUBCUTANEOUS PRN
Status: DISCONTINUED | OUTPATIENT
Start: 2024-05-16 | End: 2024-05-16 | Stop reason: SDUPTHER

## 2024-05-16 RX ORDER — SODIUM CHLORIDE 0.9 % (FLUSH) 0.9 %
5-40 SYRINGE (ML) INJECTION EVERY 12 HOURS SCHEDULED
Status: DISCONTINUED | OUTPATIENT
Start: 2024-05-16 | End: 2024-05-16 | Stop reason: HOSPADM

## 2024-05-16 RX ORDER — SODIUM CHLORIDE, SODIUM LACTATE, POTASSIUM CHLORIDE, CALCIUM CHLORIDE 600; 310; 30; 20 MG/100ML; MG/100ML; MG/100ML; MG/100ML
INJECTION, SOLUTION INTRAVENOUS CONTINUOUS
Status: DISCONTINUED | OUTPATIENT
Start: 2024-05-16 | End: 2024-05-16 | Stop reason: HOSPADM

## 2024-05-16 RX ORDER — ACETAMINOPHEN 500 MG
1000 TABLET ORAL ONCE
Status: COMPLETED | OUTPATIENT
Start: 2024-05-16 | End: 2024-05-16

## 2024-05-16 RX ORDER — PROMETHAZINE HYDROCHLORIDE 25 MG/1
25 TABLET ORAL 4 TIMES DAILY PRN
Qty: 20 TABLET | Refills: 0 | Status: SHIPPED | OUTPATIENT
Start: 2024-05-16 | End: 2024-05-23

## 2024-05-16 RX ORDER — GLYCOPYRROLATE 0.2 MG/ML
INJECTION INTRAMUSCULAR; INTRAVENOUS PRN
Status: DISCONTINUED | OUTPATIENT
Start: 2024-05-16 | End: 2024-05-16 | Stop reason: SDUPTHER

## 2024-05-16 RX ORDER — SODIUM CHLORIDE 9 MG/ML
INJECTION, SOLUTION INTRAVENOUS PRN
Status: DISCONTINUED | OUTPATIENT
Start: 2024-05-16 | End: 2024-05-16 | Stop reason: HOSPADM

## 2024-05-16 RX ORDER — HYDROMORPHONE HYDROCHLORIDE 1 MG/ML
0.5 INJECTION, SOLUTION INTRAMUSCULAR; INTRAVENOUS; SUBCUTANEOUS EVERY 5 MIN PRN
Status: COMPLETED | OUTPATIENT
Start: 2024-05-16 | End: 2024-05-16

## 2024-05-16 RX ORDER — OXYCODONE HYDROCHLORIDE 5 MG/1
5 TABLET ORAL ONCE
Status: DISCONTINUED | OUTPATIENT
Start: 2024-05-16 | End: 2024-05-16 | Stop reason: HOSPADM

## 2024-05-16 RX ORDER — ONDANSETRON 2 MG/ML
INJECTION INTRAMUSCULAR; INTRAVENOUS PRN
Status: DISCONTINUED | OUTPATIENT
Start: 2024-05-16 | End: 2024-05-16 | Stop reason: SDUPTHER

## 2024-05-16 RX ORDER — SODIUM CHLORIDE 0.9 % (FLUSH) 0.9 %
5-40 SYRINGE (ML) INJECTION PRN
Status: DISCONTINUED | OUTPATIENT
Start: 2024-05-16 | End: 2024-05-16 | Stop reason: HOSPADM

## 2024-05-16 RX ORDER — OXYCODONE HYDROCHLORIDE AND ACETAMINOPHEN 5; 325 MG/1; MG/1
1 TABLET ORAL EVERY 4 HOURS PRN
Qty: 15 TABLET | Refills: 0 | Status: SHIPPED | OUTPATIENT
Start: 2024-05-16 | End: 2024-05-19

## 2024-05-16 RX ORDER — EPHEDRINE SULFATE 50 MG/ML
INJECTION INTRAVENOUS PRN
Status: DISCONTINUED | OUTPATIENT
Start: 2024-05-16 | End: 2024-05-16 | Stop reason: SDUPTHER

## 2024-05-16 RX ORDER — BUPIVACAINE HYDROCHLORIDE AND EPINEPHRINE 5; 5 MG/ML; UG/ML
INJECTION, SOLUTION EPIDURAL; INTRACAUDAL; PERINEURAL PRN
Status: DISCONTINUED | OUTPATIENT
Start: 2024-05-16 | End: 2024-05-16 | Stop reason: HOSPADM

## 2024-05-16 RX ORDER — FENTANYL CITRATE 50 UG/ML
25 INJECTION, SOLUTION INTRAMUSCULAR; INTRAVENOUS EVERY 5 MIN PRN
Status: DISCONTINUED | OUTPATIENT
Start: 2024-05-16 | End: 2024-05-16 | Stop reason: HOSPADM

## 2024-05-16 RX ORDER — DEXAMETHASONE SODIUM PHOSPHATE 4 MG/ML
INJECTION, SOLUTION INTRA-ARTICULAR; INTRALESIONAL; INTRAMUSCULAR; INTRAVENOUS; SOFT TISSUE PRN
Status: DISCONTINUED | OUTPATIENT
Start: 2024-05-16 | End: 2024-05-16 | Stop reason: SDUPTHER

## 2024-05-16 RX ORDER — ONDANSETRON 2 MG/ML
4 INJECTION INTRAMUSCULAR; INTRAVENOUS ONCE
Status: COMPLETED | OUTPATIENT
Start: 2024-05-16 | End: 2024-05-16

## 2024-05-16 RX ORDER — NEOSTIGMINE METHYLSULFATE 1 MG/ML
INJECTION, SOLUTION INTRAVENOUS PRN
Status: DISCONTINUED | OUTPATIENT
Start: 2024-05-16 | End: 2024-05-16 | Stop reason: SDUPTHER

## 2024-05-16 RX ORDER — KETAMINE HYDROCHLORIDE 50 MG/ML
INJECTION, SOLUTION INTRAMUSCULAR; INTRAVENOUS PRN
Status: DISCONTINUED | OUTPATIENT
Start: 2024-05-16 | End: 2024-05-16 | Stop reason: SDUPTHER

## 2024-05-16 RX ORDER — LIDOCAINE HYDROCHLORIDE 10 MG/ML
1 INJECTION, SOLUTION EPIDURAL; INFILTRATION; INTRACAUDAL; PERINEURAL
Status: COMPLETED | OUTPATIENT
Start: 2024-05-16 | End: 2024-05-16

## 2024-05-16 RX ORDER — ROCURONIUM BROMIDE 10 MG/ML
INJECTION, SOLUTION INTRAVENOUS PRN
Status: DISCONTINUED | OUTPATIENT
Start: 2024-05-16 | End: 2024-05-16 | Stop reason: SDUPTHER

## 2024-05-16 RX ORDER — FENTANYL CITRATE 50 UG/ML
100 INJECTION, SOLUTION INTRAMUSCULAR; INTRAVENOUS
Status: COMPLETED | OUTPATIENT
Start: 2024-05-16 | End: 2024-05-16

## 2024-05-16 RX ORDER — SUCCINYLCHOLINE/SOD CL,ISO/PF 200MG/10ML
SYRINGE (ML) INTRAVENOUS PRN
Status: DISCONTINUED | OUTPATIENT
Start: 2024-05-16 | End: 2024-05-16 | Stop reason: SDUPTHER

## 2024-05-16 RX ORDER — MIDAZOLAM HYDROCHLORIDE 2 MG/2ML
2 INJECTION, SOLUTION INTRAMUSCULAR; INTRAVENOUS
Status: COMPLETED | OUTPATIENT
Start: 2024-05-16 | End: 2024-05-16

## 2024-05-16 RX ORDER — NALOXONE HYDROCHLORIDE 0.4 MG/ML
INJECTION, SOLUTION INTRAMUSCULAR; INTRAVENOUS; SUBCUTANEOUS PRN
Status: DISCONTINUED | OUTPATIENT
Start: 2024-05-16 | End: 2024-05-16 | Stop reason: HOSPADM

## 2024-05-16 RX ADMIN — WATER 3000 MG: 1 INJECTION INTRAMUSCULAR; INTRAVENOUS; SUBCUTANEOUS at 08:20

## 2024-05-16 RX ADMIN — PROPOFOL 200 MG: 10 INJECTION, EMULSION INTRAVENOUS at 08:18

## 2024-05-16 RX ADMIN — HYDROMORPHONE HYDROCHLORIDE 1 MG: 2 INJECTION, SOLUTION INTRAMUSCULAR; INTRAVENOUS; SUBCUTANEOUS at 09:02

## 2024-05-16 RX ADMIN — HYDROMORPHONE HYDROCHLORIDE 1 MG: 2 INJECTION, SOLUTION INTRAMUSCULAR; INTRAVENOUS; SUBCUTANEOUS at 10:17

## 2024-05-16 RX ADMIN — GLYCOPYRROLATE 0.2 MG: 0.2 INJECTION INTRAMUSCULAR; INTRAVENOUS at 09:54

## 2024-05-16 RX ADMIN — ONDANSETRON 4 MG: 2 INJECTION INTRAMUSCULAR; INTRAVENOUS at 11:17

## 2024-05-16 RX ADMIN — MIDAZOLAM HYDROCHLORIDE 2 MG: 1 INJECTION, SOLUTION INTRAMUSCULAR; INTRAVENOUS at 08:07

## 2024-05-16 RX ADMIN — Medication 1 MG: at 10:00

## 2024-05-16 RX ADMIN — LIDOCAINE HYDROCHLORIDE 100 MG: 10 INJECTION, SOLUTION EPIDURAL; INFILTRATION; INTRACAUDAL; PERINEURAL at 08:18

## 2024-05-16 RX ADMIN — Medication 40 MG: at 08:30

## 2024-05-16 RX ADMIN — Medication 180 MG: at 08:18

## 2024-05-16 RX ADMIN — PROPOFOL 100 MG: 10 INJECTION, EMULSION INTRAVENOUS at 10:09

## 2024-05-16 RX ADMIN — ROCURONIUM BROMIDE 10 MG: 10 SOLUTION INTRAVENOUS at 08:18

## 2024-05-16 RX ADMIN — DEXAMETHASONE SODIUM PHOSPHATE 4 MG: 4 INJECTION, SOLUTION INTRAMUSCULAR; INTRAVENOUS at 08:41

## 2024-05-16 RX ADMIN — EPHEDRINE SULFATE 10 MG: 50 INJECTION INTRAVENOUS at 09:18

## 2024-05-16 RX ADMIN — HYDROMORPHONE HYDROCHLORIDE 0.5 MG: 1 INJECTION, SOLUTION INTRAMUSCULAR; INTRAVENOUS; SUBCUTANEOUS at 11:17

## 2024-05-16 RX ADMIN — KETAMINE HYDROCHLORIDE 50 MG: 50 INJECTION, SOLUTION INTRAMUSCULAR; INTRAVENOUS at 08:18

## 2024-05-16 RX ADMIN — GLYCOPYRROLATE 0.4 MG: 0.2 INJECTION INTRAMUSCULAR; INTRAVENOUS at 10:01

## 2024-05-16 RX ADMIN — EPHEDRINE SULFATE 10 MG: 50 INJECTION INTRAVENOUS at 09:11

## 2024-05-16 RX ADMIN — ACETAMINOPHEN 1000 MG: 500 TABLET ORAL at 06:35

## 2024-05-16 RX ADMIN — FENTANYL CITRATE 100 MCG: 50 INJECTION, SOLUTION INTRAMUSCULAR; INTRAVENOUS at 08:13

## 2024-05-16 RX ADMIN — Medication 2 MG: at 10:01

## 2024-05-16 RX ADMIN — Medication 1 MG: at 09:56

## 2024-05-16 RX ADMIN — SODIUM CHLORIDE, POTASSIUM CHLORIDE, SODIUM LACTATE AND CALCIUM CHLORIDE: 600; 310; 30; 20 INJECTION, SOLUTION INTRAVENOUS at 08:07

## 2024-05-16 RX ADMIN — EPHEDRINE SULFATE 10 MG: 50 INJECTION INTRAVENOUS at 09:27

## 2024-05-16 RX ADMIN — GLYCOPYRROLATE 0.2 MG: 0.2 INJECTION INTRAMUSCULAR; INTRAVENOUS at 09:56

## 2024-05-16 RX ADMIN — EPHEDRINE SULFATE 10 MG: 50 INJECTION INTRAVENOUS at 09:08

## 2024-05-16 RX ADMIN — Medication 1 MG: at 09:57

## 2024-05-16 RX ADMIN — HYDROMORPHONE HYDROCHLORIDE 0.5 MG: 1 INJECTION, SOLUTION INTRAMUSCULAR; INTRAVENOUS; SUBCUTANEOUS at 11:29

## 2024-05-16 RX ADMIN — ROCURONIUM BROMIDE 20 MG: 10 SOLUTION INTRAVENOUS at 09:23

## 2024-05-16 RX ADMIN — ONDANSETRON 4 MG: 2 INJECTION INTRAMUSCULAR; INTRAVENOUS at 09:54

## 2024-05-16 ASSESSMENT — PAIN DESCRIPTION - DESCRIPTORS
DESCRIPTORS: ACHING;SORE
DESCRIPTORS: ACHING;SORE
DESCRIPTORS: ACHING
DESCRIPTORS: ACHING
DESCRIPTORS: ACHING;SORE
DESCRIPTORS: THROBBING

## 2024-05-16 ASSESSMENT — PAIN - FUNCTIONAL ASSESSMENT
PAIN_FUNCTIONAL_ASSESSMENT: 0-10
PAIN_FUNCTIONAL_ASSESSMENT: 0-10

## 2024-05-16 ASSESSMENT — PAIN SCALES - GENERAL
PAINLEVEL_OUTOF10: 6
PAINLEVEL_OUTOF10: 8
PAINLEVEL_OUTOF10: 5
PAINLEVEL_OUTOF10: 3

## 2024-05-16 ASSESSMENT — PAIN DESCRIPTION - LOCATION
LOCATION: ANKLE

## 2024-05-16 ASSESSMENT — PAIN DESCRIPTION - ORIENTATION
ORIENTATION: LEFT

## 2024-05-16 NOTE — ANESTHESIA PRE PROCEDURE
No results found for: \"PHART\", \"PO2ART\", \"HHF6JFK\", \"JMB5QNM\", \"BEART\", \"L6DPXCRU\"     Type & Screen (If Applicable):  No results found for: \"LABABO\"    Drug/Infectious Status (If Applicable):  Lab Results   Component Value Date/Time    HEPCAB <0.1 02/08/2016 08:16 AM       COVID-19 Screening (If Applicable): No results found for: \"COVID19\"        Anesthesia Evaluation  Patient summary reviewed and Nursing notes reviewed   no history of anesthetic complications:   Airway: Mallampati: I  TM distance: <3 FB   Neck ROM: full  Mouth opening: > = 3 FB   Dental: normal exam         Pulmonary:normal exam  breath sounds clear to auscultation  (+)           asthma:                            Cardiovascular:  Exercise tolerance: good (>4 METS)  (+) hypertension:, hyperlipidemia      ECG reviewed  Rhythm: regular  Rate: normal           Beta Blocker:  Dose within 24 Hrs         Neuro/Psych:   (+) depression/anxiety             GI/Hepatic/Renal:   (+) morbid obesity          Endo/Other:    (+) DiabetesType II DM, : arthritis: OA..                 Abdominal:   (+) obese          Vascular: negative vascular ROS.         Other Findings:       Anesthesia Plan      general     ASA 3       Induction: intravenous.    MIPS: Postoperative opioids intended and Prophylactic antiemetics administered.  Anesthetic plan and risks discussed with patient.    Use of blood products discussed with patient whom consented to blood products.    Plan discussed with CRNA.    Attending anesthesiologist reviewed and agrees with Preprocedure content            Lindsey Cooper DO   5/16/2024

## 2024-05-16 NOTE — DISCHARGE INSTRUCTIONS
business decisions  Do not drink alcoholic beverages    Report the following to your surgeon:  Excessive pain, swelling, redness or odor of or around the surgical area  Temperature over 100.5 degrees  Nausea and vomiting lasting longer than 4 hours or if unable to take medication  Any signs of decreased circulation or nerve impairment to extremity:  Change in color, persistent numbness, tingling, coldness or increased pain.  Any questions    Took Tylenol 1000 mg at 6:35 am no Tylenol before 12:35 pm today if needed for pain

## 2024-05-16 NOTE — ANESTHESIA POSTPROCEDURE EVALUATION
Department of Anesthesiology  Postprocedure Note    Patient: Fallon Dowd  MRN: [id number]  YOB: 1960  Date of evaluation: 5/16/2024    Procedure Summary       Date: 05/16/24 Room / Location: [facility] MAIN OR [address] [place] [address] MAIN OR    Anesthesia Start: 0807 Anesthesia Stop: 1033    Procedure: LEFT FOOT REPAIR ACHILLES TENDON AND REMOVE HEEL SPUR (Left: Foot) Diagnosis:       Heel spur, left      Enthesitis      (Heel spur, left [M77.32])      (Enthesitis [M77.9])    Providers: Sandra Lindsay DPM Responsible Provider: Lindsey Cooper DO    Anesthesia Type: general ASA Status: 3            Anesthesia Type: No value filed.    Yolis Phase I: Yolis Score: 10    Yolis Phase II: Yolis Score: 10    Anesthesia Post Evaluation    Patient location during evaluation: PACU  Patient participation: complete - patient participated  Level of consciousness: awake and alert  Pain score: 0  Airway patency: patent  Nausea & Vomiting: no nausea and no vomiting  Cardiovascular status: blood pressure returned to baseline and hemodynamically stable  Respiratory status: acceptable and room air  Hydration status: euvolemic  Multimodal analgesia pain management approach  Pain management: adequate and satisfactory to patient    No notable events documented.

## 2024-05-16 NOTE — OP NOTE
Operative Note      Patient: Fallon Dowd  YOB: 1960  MRN: [id number]    Date of Procedure: 5/16/2024    Pre-Op Diagnosis Codes:     * Heel spur, left [M77.32]     * Enthesitis [M77.9]  Haglunds deformity       Post-Op Diagnosis: Same       Procedure(s):  LEFT FOOT REPAIR ACHILLES TENDON AND REMOVE HEEL SPUR    Surgeon(s):  Smiley Borges DPM Rubin, Laurence G, DPM    Assistant:   * No surgical staff found *    Anesthesia: General    Estimated Blood Loss (mL): less than 50     Complications: None    Specimens:   * No specimens in log *    Implants:  Implant Name Type Inv. Item Serial No.  Lot No. LRB No. Used Action   Quattro X Suture [place]   N/A [employer] [id number] Left 1 Implanted   Quattro X Suture [place]   N/A [employer] [id number] Left 1 Implanted         Drains: * No LDAs found *    Findings:  Infection Present At Time Of Surgery (PATOS) (choose all levels that have infection present):  No infection present      Detailed Description of Procedure:   The patient was seen in the preoperative area where we discussed the surgery including risks and complications.  Consent was signed the leg was marked and patient was taken to the operating room placed on the operating table in prone position she was secured down we inspected the patient to make sure there is no impingement or areas of concern.  The axilla was were protected with egg crate.  Incision made along the posterior medial aspect of the heel and then and then brought over to the posterior calcaneus.  This was taken down to the level of the Achilles tendon the tissues were removed from the Achilles tendon the peritenon was not disturbed.  I then take took a 10 blade and placed it between the Achilles tendon and the posterior calcaneus.  Once the blade was and the spur we used an osteotome to remove the Achilles and the spur in 1 piece.  The Achilles was then flipped over the ventral position

## 2024-05-16 NOTE — BRIEF OP NOTE
Brief Postoperative Note      Patient: Fallon Dowd  YOB: 1960  MRN: [id number]    Date of Procedure: 5/16/2024    Pre-Op Diagnosis Codes:     * Heel spur, left [M77.32]     * Enthesitis [M77.9]  Haglunds deformity  Post-Op Diagnosis: Same       Procedure(s):  LEFT FOOT REPAIR ACHILLES TENDON AND REMOVE HEEL SPUR    Surgeon(s):  Smiley Borges DPM Rubin, Laurence G, DPM    Assistant:  * No surgical staff found *    Anesthesia: General    Estimated Blood Loss (mL): less than 50     Complications: None    Specimens:   * No specimens in log *    Implants:  Implant Name Type Inv. Item Serial No.  Lot No. LRB No. Used Action   Quattro X Suture [place]   N/A [employer] [id number] Left 1 Implanted   Quattro X Suture [place]   N/A [employer] [id number] Left 1 Implanted         Drains: * No LDAs found *    Findings:  Infection Present At Time Of Surgery (PATOS) (choose all levels that have infection present):  No infection present  Other Findings: heel spur and Haglunds     Electronically signed by Sandra Lindsay DPM on 5/16/2024 at 10:19 AM

## 2024-06-20 DIAGNOSIS — I10 HYPERTENSION, ESSENTIAL, BENIGN: ICD-10-CM

## 2024-06-20 RX ORDER — HYDRALAZINE HYDROCHLORIDE 25 MG/1
25 TABLET, FILM COATED ORAL 3 TIMES DAILY
Qty: 90 TABLET | Refills: 0 | Status: SHIPPED | OUTPATIENT
Start: 2024-06-20

## 2024-06-20 NOTE — TELEPHONE ENCOUNTER
PCP: Awa Betancourt MD     Last appt: 4/26/2024    Future Appointments   Date Time Provider Department Center   8/16/2024  9:50 AM Awa Betancourt MD [facility] BS AMB          Requested Prescriptions     Pending Prescriptions Disp Refills    hydrALAZINE (APRESOLINE) 25 MG tablet 90 tablet 3     Sig: Take 1 tablet by mouth 3 times daily

## 2024-07-01 DIAGNOSIS — E66.01 MORBID OBESITY WITH BMI OF 45.0-49.9, ADULT (HCC): ICD-10-CM

## 2024-07-01 RX ORDER — TIRZEPATIDE 10 MG/.5ML
10 INJECTION, SOLUTION SUBCUTANEOUS
Qty: 2 ML | Refills: 0 | Status: SHIPPED | OUTPATIENT
Start: 2024-07-01

## 2024-07-01 RX ORDER — TIRZEPATIDE 7.5 MG/.5ML
0.5 INJECTION, SOLUTION SUBCUTANEOUS
Qty: 2 ML | Refills: 1 | Status: CANCELLED | OUTPATIENT
Start: 2024-07-01

## 2024-07-01 NOTE — TELEPHONE ENCOUNTER
Future Appointments:  Future Appointments   Date Time Provider Department Center   8/16/2024  9:50 AM Awa Betancourt MD [facility] BS AMB        Last Appointment With Me:  4/26/2024     Requested Prescriptions     Pending Prescriptions Disp Refills    Tirzepatide-Weight Management (ZEPBOUND) 10 MG/0.5ML SOAJ 2 mL 0     Sig: Inject 10 mg into the skin every 7 days

## 2024-07-23 DIAGNOSIS — I10 HYPERTENSION, ESSENTIAL, BENIGN: ICD-10-CM

## 2024-07-23 RX ORDER — HYDRALAZINE HYDROCHLORIDE 25 MG/1
25 TABLET, FILM COATED ORAL 3 TIMES DAILY
Qty: 90 TABLET | Refills: 2 | Status: SHIPPED | OUTPATIENT
Start: 2024-07-23

## 2024-07-23 NOTE — TELEPHONE ENCOUNTER
PCP: Awa Betancourt MD     Last appt:  4/26/2024      Future Appointments   Date Time Provider Department Center   8/16/2024  9:50 AM Awa Betancourt MD [facility] BS AMB          Requested Prescriptions     Pending Prescriptions Disp Refills    hydrALAZINE (APRESOLINE) 25 MG tablet 90 tablet 0     Sig: Take 1 tablet by mouth 3 times daily

## 2024-07-26 DIAGNOSIS — E66.01 MORBID OBESITY WITH BMI OF 45.0-49.9, ADULT (HCC): ICD-10-CM

## 2024-07-26 RX ORDER — TIRZEPATIDE 12.5 MG/.5ML
12.5 INJECTION, SOLUTION SUBCUTANEOUS
Qty: 2 ML | Refills: 0 | Status: SHIPPED | OUTPATIENT
Start: 2024-07-26

## 2024-07-26 RX ORDER — TIRZEPATIDE 10 MG/.5ML
10 INJECTION, SOLUTION SUBCUTANEOUS
Qty: 2 ML | Refills: 0 | Status: CANCELLED | OUTPATIENT
Start: 2024-07-26

## 2024-07-26 NOTE — TELEPHONE ENCOUNTER
PCP: Awa Betancourt MD     Last appt:  4/26/2024      Future Appointments   Date Time Provider Department Center   8/16/2024  9:50 AM Awa Betancourt MD [facility] BS AMB          Requested Prescriptions     Pending Prescriptions Disp Refills    Tirzepatide-Weight Management (ZEPBOUND) 12.5 MG/0.5ML SOAJ 2 mL 0     Sig: Inject 12.5 mg into the skin every 7 days

## 2024-07-31 NOTE — TELEPHONE ENCOUNTER
PCP: Awa Betancourt MD     Last appt:  4/26/2024      Future Appointments   Date Time Provider Department Center   8/16/2024  9:50 AM Aaw Betancourt MD [facility] BS AMB          Requested Prescriptions     Pending Prescriptions Disp Refills    Tirzepatide-Weight Management (ZEPBOUND) 12.5 MG/0.5ML SOAJ 2 mL 0     Sig: Inject 12.5 mg into the skin every 7 days

## 2024-07-31 NOTE — TELEPHONE ENCOUNTER
Patient called in stating [facility] does not have Tirzepatide-Weight Management (ZEPBOUND) 10 MG/0.5ML SOAJ  in stock and to please send to [facility] pharmacy at [facility]

## 2024-08-01 RX ORDER — TIRZEPATIDE 12.5 MG/.5ML
12.5 INJECTION, SOLUTION SUBCUTANEOUS
Qty: 2 ML | Refills: 0 | Status: SHIPPED | OUTPATIENT
Start: 2024-08-01

## 2024-08-05 ENCOUNTER — LAB (OUTPATIENT)
Facility: CLINIC | Age: 64
End: 2024-08-05

## 2024-08-05 DIAGNOSIS — R73.03 PREDIABETES: ICD-10-CM

## 2024-08-05 DIAGNOSIS — I10 HYPERTENSION, ESSENTIAL, BENIGN: ICD-10-CM

## 2024-08-05 DIAGNOSIS — E55.9 VITAMIN D DEFICIENCY: ICD-10-CM

## 2024-08-05 DIAGNOSIS — E78.00 HYPERCHOLESTEROLEMIA: ICD-10-CM

## 2024-08-06 ENCOUNTER — TELEPHONE (OUTPATIENT)
Facility: CLINIC | Age: 64
End: 2024-08-06

## 2024-08-06 LAB
25(OH)D3 SERPL-MCNC: 18.5 NG/ML (ref 30–100)
ALBUMIN SERPL-MCNC: 4.1 G/DL (ref 3.5–5)
ALBUMIN/GLOB SERPL: 1.5 (ref 1.1–2.2)
ALP SERPL-CCNC: 89 U/L (ref 45–117)
ALT SERPL-CCNC: 19 U/L (ref 12–78)
ANION GAP SERPL CALC-SCNC: 4 MMOL/L (ref 5–15)
AST SERPL-CCNC: 10 U/L (ref 15–37)
BASOPHILS # BLD: 0.1 K/UL (ref 0–0.1)
BASOPHILS NFR BLD: 1 % (ref 0–1)
BILIRUB SERPL-MCNC: 0.4 MG/DL (ref 0.2–1)
BUN SERPL-MCNC: 15 MG/DL (ref 6–20)
BUN/CREAT SERPL: 15 (ref 12–20)
CALCIUM SERPL-MCNC: 10.1 MG/DL (ref 8.5–10.1)
CHLORIDE SERPL-SCNC: 104 MMOL/L (ref 97–108)
CHOLEST SERPL-MCNC: 161 MG/DL
CO2 SERPL-SCNC: 33 MMOL/L (ref 21–32)
CREAT SERPL-MCNC: 1.01 MG/DL (ref 0.55–1.02)
DIFFERENTIAL METHOD BLD: ABNORMAL
EOSINOPHIL # BLD: 0.5 K/UL (ref 0–0.4)
EOSINOPHIL NFR BLD: 6 % (ref 0–7)
ERYTHROCYTE [DISTWIDTH] IN BLOOD BY AUTOMATED COUNT: 14.3 % (ref 11.5–14.5)
EST. AVERAGE GLUCOSE BLD GHB EST-MCNC: 105 MG/DL
GLOBULIN SER CALC-MCNC: 2.7 G/DL (ref 2–4)
GLUCOSE SERPL-MCNC: 99 MG/DL (ref 65–100)
HBA1C MFR BLD: 5.3 % (ref 4–5.6)
HCT VFR BLD AUTO: 35.5 % (ref 35–47)
HDLC SERPL-MCNC: 58 MG/DL
HDLC SERPL: 2.8 (ref 0–5)
HGB BLD-MCNC: 11.2 G/DL (ref 11.5–16)
IMM GRANULOCYTES # BLD AUTO: 0 K/UL (ref 0–0.04)
IMM GRANULOCYTES NFR BLD AUTO: 0 % (ref 0–0.5)
LDLC SERPL CALC-MCNC: 76 MG/DL (ref 0–100)
LYMPHOCYTES # BLD: 2 K/UL (ref 0.8–3.5)
LYMPHOCYTES NFR BLD: 25 % (ref 12–49)
MCH RBC QN AUTO: 26.2 PG (ref 26–34)
MCHC RBC AUTO-ENTMCNC: 31.5 G/DL (ref 30–36.5)
MCV RBC AUTO: 83.1 FL (ref 80–99)
MONOCYTES # BLD: 0.5 K/UL (ref 0–1)
MONOCYTES NFR BLD: 6 % (ref 5–13)
NEUTS SEG # BLD: 4.9 K/UL (ref 1.8–8)
NEUTS SEG NFR BLD: 62 % (ref 32–75)
NRBC # BLD: 0 K/UL (ref 0–0.01)
NRBC BLD-RTO: 0 PER 100 WBC
PLATELET # BLD AUTO: 317 K/UL (ref 150–400)
PMV BLD AUTO: 9.4 FL (ref 8.9–12.9)
POTASSIUM SERPL-SCNC: 4.3 MMOL/L (ref 3.5–5.1)
PROT SERPL-MCNC: 6.8 G/DL (ref 6.4–8.2)
RBC # BLD AUTO: 4.27 M/UL (ref 3.8–5.2)
SODIUM SERPL-SCNC: 141 MMOL/L (ref 136–145)
TRIGL SERPL-MCNC: 135 MG/DL
VLDLC SERPL CALC-MCNC: 27 MG/DL
WBC # BLD AUTO: 8 K/UL (ref 3.6–11)

## 2024-08-16 ENCOUNTER — OFFICE VISIT (OUTPATIENT)
Facility: CLINIC | Age: 64
End: 2024-08-16
Payer: MEDICARE

## 2024-08-16 VITALS
DIASTOLIC BLOOD PRESSURE: 80 MMHG | BODY MASS INDEX: 45.41 KG/M2 | HEIGHT: 64 IN | RESPIRATION RATE: 16 BRPM | OXYGEN SATURATION: 99 % | WEIGHT: 266 LBS | TEMPERATURE: 98.1 F | SYSTOLIC BLOOD PRESSURE: 124 MMHG | HEART RATE: 66 BPM

## 2024-08-16 DIAGNOSIS — Z12.11 SCREENING FOR COLON CANCER: ICD-10-CM

## 2024-08-16 DIAGNOSIS — Z98.890 STATUS POST LEFT FOOT SURGERY: ICD-10-CM

## 2024-08-16 DIAGNOSIS — I10 HYPERTENSION, ESSENTIAL, BENIGN: Primary | ICD-10-CM

## 2024-08-16 DIAGNOSIS — E55.9 VITAMIN D DEFICIENCY: ICD-10-CM

## 2024-08-16 PROCEDURE — G8417 CALC BMI ABV UP PARAM F/U: HCPCS | Performed by: INTERNAL MEDICINE

## 2024-08-16 PROCEDURE — G8400 PT W/DXA NO RESULTS DOC: HCPCS | Performed by: INTERNAL MEDICINE

## 2024-08-16 PROCEDURE — 1090F PRES/ABSN URINE INCON ASSESS: CPT | Performed by: INTERNAL MEDICINE

## 2024-08-16 PROCEDURE — 1123F ACP DISCUSS/DSCN MKR DOCD: CPT | Performed by: INTERNAL MEDICINE

## 2024-08-16 PROCEDURE — 3017F COLORECTAL CA SCREEN DOC REV: CPT | Performed by: INTERNAL MEDICINE

## 2024-08-16 PROCEDURE — 99214 OFFICE O/P EST MOD 30 MIN: CPT | Performed by: INTERNAL MEDICINE

## 2024-08-16 PROCEDURE — G8427 DOCREV CUR MEDS BY ELIG CLIN: HCPCS | Performed by: INTERNAL MEDICINE

## 2024-08-16 PROCEDURE — 1036F TOBACCO NON-USER: CPT | Performed by: INTERNAL MEDICINE

## 2024-08-16 PROCEDURE — 3074F SYST BP LT 130 MM HG: CPT | Performed by: INTERNAL MEDICINE

## 2024-08-16 PROCEDURE — 3079F DIAST BP 80-89 MM HG: CPT | Performed by: INTERNAL MEDICINE

## 2024-08-16 RX ORDER — ERGOCALCIFEROL (VITAMIN D2) 50 MCG
1 CAPSULE ORAL DAILY
COMMUNITY
Start: 2024-08-16

## 2024-08-16 SDOH — ECONOMIC STABILITY: FOOD INSECURITY: WITHIN THE PAST 12 MONTHS, THE FOOD YOU BOUGHT JUST DIDN'T LAST AND YOU DIDN'T HAVE MONEY TO GET MORE.: NEVER TRUE

## 2024-08-16 SDOH — ECONOMIC STABILITY: FOOD INSECURITY: WITHIN THE PAST 12 MONTHS, YOU WORRIED THAT YOUR FOOD WOULD RUN OUT BEFORE YOU GOT MONEY TO BUY MORE.: NEVER TRUE

## 2024-08-16 SDOH — ECONOMIC STABILITY: INCOME INSECURITY: HOW HARD IS IT FOR YOU TO PAY FOR THE VERY BASICS LIKE FOOD, HOUSING, MEDICAL CARE, AND HEATING?: NOT HARD AT ALL

## 2024-08-16 NOTE — PROGRESS NOTES
Fallon Dowd  Identified pt with two pt identifiers(name and ).  Chief Complaint   Patient presents with    Hypertension       1. Have you been to the ER, urgent care clinic since your last visit?  Hospitalized since your last visit? NO    2. Have you seen or consulted any other health care providers outside of the [facility] since your last visit?  Include any pap smears or colon screening. Pt will get a mammogram in the fall. Has apt with ortho on Monday.       Provider notified of reason for visit, vitals and flowsheets obtained on patients.     Patient received paperwork for advance directive during previous visit but has not completed at this time     Reviewed record In preparation for visit, huddled with provider and have obtained necessary documentation      Health Maintenance Due   Topic    Colorectal Cancer Screen     Breast cancer screen        Wt Readings from Last 3 Encounters:   24 120.7 kg (266 lb)   05/15/24 122.5 kg (270 lb)   24 125.4 kg (276 lb 6.4 oz)     Temp Readings from Last 3 Encounters:   24 98.1 °F (36.7 °C) (Oral)   24 98.4 °F (36.9 °C) (Oral)   24 97.4 °F (36.3 °C) (Temporal)     BP Readings from Last 3 Encounters:   24 124/80   24 (!) 130/55   24 125/85     Pulse Readings from Last 3 Encounters:   24 66   24 79   24 90          No data to display                  Learning Assessment:  :         1/3/2024     1:00 PM   [facility] AMB LEARNING ASSESSMENT   Primary Learner Patient   level of education 2 YEARS OF COLLEGE   Barriers Factors NONE   Primary Language ENGLISH   Learning Preference DEMONSTRATION   Answered By Patient   Relationship to Learner SELF       Fall Risk Assessment:  :         2024     1:56 PM 2023     9:31 AM   Amb Fall Risk Assessment and TUG Test   Do you feel unsteady or are you worried about falling?  no no   2 or more falls in past year? no no   Fall with injury in past year? 
08/05/2024    CREATININE 1.01 08/05/2024    BUN 15 08/05/2024    CO2 33 (H) 08/05/2024    TSH 2.820 09/10/2021    LABA1C 5.3 08/05/2024        ASSESSMENT AND PLAN      ICD-10-CM    1. Hypertension, essential, benign  I10       2. Status post left foot surgery  Z98.890       3. Vitamin D deficiency  E55.9 Vitamin D, Ergocalciferol, 50 MCG (2000 UT) CAPS      4. BMI 45.0-49.9, adult (HCC)  Z68.42       5. Screening for colon cancer  Z12.11 [facility]         Diagnoses and all orders for this visit:    1. Hypertension, essential, benign  Controlled. Continue lisinopril-HCTZ-12.5 mg 2 tabs daily, hydralazine 25 mg TID, and metoprolol XL 25 mg daily.     2. Status post left foot surgery  Recovering. Continue PT.    3. Vitamin D deficiency  I recommended OTC vitamin D.  - Start OTC Vitamin D, Ergocalciferol, 50 MCG (2000 UT) CAPS; Take 1 capsule by mouth daily    4. BMI 45.0-49.9, adult (HCC)  Losing weight on Zepbound. Plan to increase from 12.5 to 15 mg weekly with next refill.    5. Screening for colon cancer  - Casper Ruiz Jr., MD, Gastroenterology, [place]    Return in about 3 months (around 11/16/2024), or if symptoms worsen or fail to improve, for Wt mgt.     I have discussed the diagnosis with the patient and the intended plan as seen in the above orders. Patient is in agreement.  The patient has received an after-visit summary and questions were answered concerning future plans.  I have discussed medication side effects and warnings with the patient as well.

## 2024-08-26 DIAGNOSIS — E66.01 MORBID OBESITY WITH BMI OF 45.0-49.9, ADULT (HCC): Primary | ICD-10-CM

## 2024-08-26 RX ORDER — TIRZEPATIDE 15 MG/.5ML
0.5 INJECTION, SOLUTION SUBCUTANEOUS
Qty: 2 ML | Refills: 2 | Status: SHIPPED | OUTPATIENT
Start: 2024-08-26

## 2024-08-26 RX ORDER — TIRZEPATIDE 12.5 MG/.5ML
12.5 INJECTION, SOLUTION SUBCUTANEOUS
Qty: 2 ML | Refills: 0 | Status: CANCELLED | OUTPATIENT
Start: 2024-08-26

## 2024-08-26 NOTE — TELEPHONE ENCOUNTER
PCP: Awa Betancourt MD     Last appt:  8/16/2024      Future Appointments   Date Time Provider Department Center   12/23/2024  9:50 AM Awa Betancourt MD [facility] DEP          Requested Prescriptions     Pending Prescriptions Disp Refills    Tirzepatide-Weight Management (ZEPBOUND) 12.5 MG/0.5ML SOAJ 2 mL 0     Sig: Inject 12.5 mg into the skin every 7 days

## 2024-09-17 DIAGNOSIS — F32.1 CURRENT MODERATE EPISODE OF MAJOR DEPRESSIVE DISORDER WITHOUT PRIOR EPISODE (HCC): ICD-10-CM

## 2024-09-17 DIAGNOSIS — E78.00 HYPERCHOLESTEROLEMIA: ICD-10-CM

## 2024-09-17 DIAGNOSIS — I10 HYPERTENSION, ESSENTIAL, BENIGN: ICD-10-CM

## 2024-09-17 DIAGNOSIS — F51.04 CHRONIC INSOMNIA: ICD-10-CM

## 2024-09-17 DIAGNOSIS — F41.1 GENERALIZED ANXIETY DISORDER: ICD-10-CM

## 2024-09-18 RX ORDER — TRAZODONE HYDROCHLORIDE 50 MG/1
100 TABLET, FILM COATED ORAL NIGHTLY
Qty: 180 TABLET | Refills: 2 | Status: SHIPPED | OUTPATIENT
Start: 2024-09-18

## 2024-09-18 RX ORDER — PRAVASTATIN SODIUM 20 MG
20 TABLET ORAL NIGHTLY
Qty: 90 TABLET | Refills: 2 | Status: SHIPPED | OUTPATIENT
Start: 2024-09-18

## 2024-09-18 RX ORDER — LISINOPRIL AND HYDROCHLOROTHIAZIDE 12.5; 2 MG/1; MG/1
2 TABLET ORAL DAILY
Qty: 180 TABLET | Refills: 2 | Status: SHIPPED | OUTPATIENT
Start: 2024-09-18

## 2024-09-18 RX ORDER — DULOXETIN HYDROCHLORIDE 60 MG/1
60 CAPSULE, DELAYED RELEASE ORAL 2 TIMES DAILY
Qty: 180 CAPSULE | Refills: 2 | Status: SHIPPED | OUTPATIENT
Start: 2024-09-18

## 2024-09-25 DIAGNOSIS — E66.01 MORBID OBESITY WITH BMI OF 45.0-49.9, ADULT: ICD-10-CM

## 2024-09-25 RX ORDER — TIRZEPATIDE 15 MG/.5ML
0.5 INJECTION, SOLUTION SUBCUTANEOUS
Qty: 2 ML | Refills: 2 | Status: SHIPPED | OUTPATIENT
Start: 2024-09-25

## 2024-10-08 DIAGNOSIS — I10 HYPERTENSION, ESSENTIAL, BENIGN: Primary | ICD-10-CM

## 2024-10-08 RX ORDER — METOPROLOL SUCCINATE 25 MG/1
25 TABLET, EXTENDED RELEASE ORAL DAILY
Qty: 90 TABLET | Refills: 3 | Status: SHIPPED | OUTPATIENT
Start: 2024-10-08

## 2024-10-08 NOTE — TELEPHONE ENCOUNTER
PCP: Awa Betancourt MD     Last appt:  8/16/2024      Future Appointments   Date Time Provider Department Center   12/23/2024  9:50 AM Awa Betancourt MD [facility] DEP          Requested Prescriptions     Pending Prescriptions Disp Refills    metoprolol succinate (TOPROL XL) 25 MG extended release tablet 90 tablet 2     Sig: Take 1 tablet by mouth daily

## 2024-10-22 DIAGNOSIS — I10 HYPERTENSION, ESSENTIAL, BENIGN: ICD-10-CM

## 2024-10-22 RX ORDER — HYDRALAZINE HYDROCHLORIDE 25 MG/1
25 TABLET, FILM COATED ORAL 3 TIMES DAILY
Qty: 90 TABLET | Refills: 2 | Status: SHIPPED | OUTPATIENT
Start: 2024-10-22

## 2024-10-22 NOTE — TELEPHONE ENCOUNTER
PCP: Awa Betancourt MD     Last appt:  8/16/2024      Future Appointments   Date Time Provider Department Center   12/23/2024  9:50 AM Awa Betancourt MD [facility] DEP          Requested Prescriptions     Pending Prescriptions Disp Refills    hydrALAZINE (APRESOLINE) 25 MG tablet 90 tablet 2     Sig: Take 1 tablet by mouth 3 times daily

## 2024-11-19 DIAGNOSIS — E66.01 MORBID OBESITY WITH BMI OF 45.0-49.9, ADULT: ICD-10-CM

## 2024-11-19 NOTE — TELEPHONE ENCOUNTER
PCP: Awa Betancourt MD     Last appt:  8/16/2024      Future Appointments   Date Time Provider Department Center   12/23/2024  9:50 AM Awa Betancourt MD [facility] DEP          Requested Prescriptions     Pending Prescriptions Disp Refills    tirzepatide-weight management (ZEPBOUND) 15 MG/0.5ML SOAJ subCUTAneous auto-injector pen 2 mL 2     Sig: Inject 0.5 mLs into the skin every 7 days

## 2024-11-20 RX ORDER — TIRZEPATIDE 15 MG/.5ML
0.5 INJECTION, SOLUTION SUBCUTANEOUS
Qty: 2 ML | Refills: 2 | Status: SHIPPED | OUTPATIENT
Start: 2024-11-20

## 2024-12-16 DIAGNOSIS — E66.01 MORBID OBESITY WITH BMI OF 45.0-49.9, ADULT: ICD-10-CM

## 2024-12-16 NOTE — TELEPHONE ENCOUNTER
Future Appointments:  Future Appointments   Date Time Provider Department Center   12/23/2024  9:50 AM Awa Betancourt MD [facility] BS [facility] DEP        Last Appointment With Me:  8/16/2024     Requested Prescriptions     Pending Prescriptions Disp Refills    ZEPBOUND 15 MG/0.5ML SOAJ subCUTAneous auto-injector pen [Pharmacy Med Name: ZEPBOUND 15 MG/0.5 ML PEN[R^]] 2 mL 2     Sig: INJECT THE CONTENTS OF ONE SYRINGE UNDER THE SKIN ONCE WEEKLY

## 2024-12-17 RX ORDER — TIRZEPATIDE 15 MG/.5ML
INJECTION, SOLUTION SUBCUTANEOUS
Qty: 2 ML | Refills: 2 | Status: SHIPPED | OUTPATIENT
Start: 2024-12-17

## 2024-12-23 ENCOUNTER — OFFICE VISIT (OUTPATIENT)
Facility: CLINIC | Age: 64
End: 2024-12-23
Payer: MEDICARE

## 2024-12-23 VITALS
TEMPERATURE: 97.5 F | BODY MASS INDEX: 43.64 KG/M2 | HEART RATE: 72 BPM | SYSTOLIC BLOOD PRESSURE: 97 MMHG | OXYGEN SATURATION: 95 % | RESPIRATION RATE: 16 BRPM | WEIGHT: 255.6 LBS | HEIGHT: 64 IN | DIASTOLIC BLOOD PRESSURE: 63 MMHG

## 2024-12-23 DIAGNOSIS — Z98.890 S/P SURGICAL MANIPULATION OF ANKLE JOINT: ICD-10-CM

## 2024-12-23 DIAGNOSIS — M17.11 PRIMARY OSTEOARTHRITIS OF RIGHT KNEE: ICD-10-CM

## 2024-12-23 DIAGNOSIS — E66.01 MORBID OBESITY WITH BMI OF 40.0-44.9, ADULT: Primary | ICD-10-CM

## 2024-12-23 PROCEDURE — G8484 FLU IMMUNIZE NO ADMIN: HCPCS | Performed by: INTERNAL MEDICINE

## 2024-12-23 PROCEDURE — 1123F ACP DISCUSS/DSCN MKR DOCD: CPT | Performed by: INTERNAL MEDICINE

## 2024-12-23 PROCEDURE — 1036F TOBACCO NON-USER: CPT | Performed by: INTERNAL MEDICINE

## 2024-12-23 PROCEDURE — 99214 OFFICE O/P EST MOD 30 MIN: CPT | Performed by: INTERNAL MEDICINE

## 2024-12-23 PROCEDURE — 3074F SYST BP LT 130 MM HG: CPT | Performed by: INTERNAL MEDICINE

## 2024-12-23 PROCEDURE — G8427 DOCREV CUR MEDS BY ELIG CLIN: HCPCS | Performed by: INTERNAL MEDICINE

## 2024-12-23 PROCEDURE — G8400 PT W/DXA NO RESULTS DOC: HCPCS | Performed by: INTERNAL MEDICINE

## 2024-12-23 PROCEDURE — 3078F DIAST BP <80 MM HG: CPT | Performed by: INTERNAL MEDICINE

## 2024-12-23 PROCEDURE — 1090F PRES/ABSN URINE INCON ASSESS: CPT | Performed by: INTERNAL MEDICINE

## 2024-12-23 PROCEDURE — 3017F COLORECTAL CA SCREEN DOC REV: CPT | Performed by: INTERNAL MEDICINE

## 2024-12-23 PROCEDURE — G8417 CALC BMI ABV UP PARAM F/U: HCPCS | Performed by: INTERNAL MEDICINE

## 2024-12-23 NOTE — PATIENT INSTRUCTIONS
Patient Instructions  Call [facility]'s [place] to schedule mammogram and DEXA bone density study. Phone: [phone]   Pt on RA with documented sats. Will continue to monitor.

## 2024-12-23 NOTE — PROGRESS NOTES
\"Have you been to the ER, urgent care clinic since your last visit?  Hospitalized since your last visit?\"    NO    “Have you seen or consulted any other health care providers outside our system since your last visit?”    NO    Have you had a mammogram?”   NO    Date of last Mammogram: 8/3/2022       “Have you had a colorectal cancer screening such as a colonoscopy/FIT/Cologuard?    NO    No colonoscopy on file  Date of last Cologuard: 6/9/2021  No FIT/FOBT on file   No flexible sigmoidoscopy on file          
S2, no murmur, gallop, or rub  Abdomen: Soft, obese, normal bowel sounds, no tenderness, no guarding or masses.  Extremities: No clubbing, cyanosis, or edema. Normal ROM with mild crepitus at both knees.    Neurological: Alert and oriented. No focal deficits.  Psychiatric: Normal mood and affect. Behavior is normal.     Assessment & Plan  1. Weight management - The patient has lost 28 pounds since starting Zepbound in early March with a current weight of 255 pounds and a BMI of 43.8. She reports tolerating the medication well, with some belching, gas, and occasional diarrhea the day after the injection, which resolves within 24 hours.  - I strongly encouraged her to engage in resistance band exercises at least twice weekly  - Continue current regimen of Zepbound 15 mg for maintenance    2. Right knee osteoarthritis - The patient is scheduled for a right knee replacement in early February. She reports that the right knee is more painful, although x-rays show the left knee to be worse. She plans to have the left knee addressed the following year.  - Proceed with right knee replacement in early February  - Plan to address the left knee the following year    3. Status post left ankle surgery - The patient had left ankle surgery on 05/16/2024 and reports residual pain and cramping if on her feet too much.  - Monitor and manage residual pain and cramping    4. Health maintenance - The patient is due for a mammogram and DEXA. She wants to have these done at [facility].    - Colonoscopy scheduled for 01/23/2025  Patient Instructions  Call [facility] to schedule mammogram and DEXA bone density study. Phone: [phone]        ICD-10-CM    1. Morbid obesity with BMI of 40.0-44.9, adult  E66.01     Z68.41       2. Primary osteoarthritis of right knee  M17.11       3. S/P surgical manipulation of ankle joint  Z98.890            Return in about 4 months (around 4/23/2025), or if symptoms worsen or fail to

## 2025-01-15 DIAGNOSIS — E66.01 MORBID OBESITY WITH BMI OF 45.0-49.9, ADULT: ICD-10-CM

## 2025-01-15 NOTE — TELEPHONE ENCOUNTER
PCP: Awa Betancourt MD     Last appt:  12/23/2024      Future Appointments   Date Time Provider Department Center   4/23/2025  2:30 PM LAB [facility] DEP   4/30/2025  2:00 PM Awa Betancourt MD [facility] DEP          Requested Prescriptions     Pending Prescriptions Disp Refills    tirzepatide-weight management (ZEPBOUND) 15 MG/0.5ML SOAJ subCUTAneous auto-injector pen 2 mL 2     Sig: INJECT THE CONTENTS OF ONE PEN UNDER THE SKIN ONCE WEEKLY.

## 2025-01-16 RX ORDER — TIRZEPATIDE 15 MG/.5ML
INJECTION, SOLUTION SUBCUTANEOUS
Qty: 2 ML | Refills: 3 | Status: SHIPPED | OUTPATIENT
Start: 2025-01-16

## 2025-01-20 ENCOUNTER — ANESTHESIA EVENT (OUTPATIENT)
Facility: HOSPITAL | Age: 65
End: 2025-01-20
Payer: MEDICARE

## 2025-01-21 DIAGNOSIS — I10 HYPERTENSION, ESSENTIAL, BENIGN: ICD-10-CM

## 2025-01-21 RX ORDER — HYDRALAZINE HYDROCHLORIDE 25 MG/1
25 TABLET, FILM COATED ORAL 3 TIMES DAILY
Qty: 90 TABLET | Refills: 2 | Status: SHIPPED | OUTPATIENT
Start: 2025-01-21

## 2025-01-21 NOTE — TELEPHONE ENCOUNTER
PCP: Awa Betancourt MD     Last appt:  12/23/2024      Future Appointments   Date Time Provider Department Center   1/29/2025  8:40 AM Jadyn Villavicencio MD [facility]   4/23/2025  2:30 PM LAB [facility] DEP   4/30/2025  2:00 PM Awa Betancourt MD [facility] DEP          Requested Prescriptions     Pending Prescriptions Disp Refills    hydrALAZINE (APRESOLINE) 25 MG tablet 90 tablet 2     Sig: Take 1 tablet by mouth 3 times daily

## 2025-01-21 NOTE — ANESTHESIA PRE PROCEDURE
Generalized anxiety disorder F41.1       Past Medical History:        Diagnosis Date   • Allergic rhinitis     Seasonal   • Anxiety     Work related   • Arthritis     Knees   • Asthma    • Environmental allergies    • Hives    • HTN (hypertension) 5/20/2013   • Hypercholesterolemia    • Obesity    • Osteoarthritis     Left foot       Past Surgical History:        Procedure Laterality Date   • CARPAL TUNNEL RELEASE Bilateral 2021   • EYE SURGERY Bilateral 2013    lasik   • GYN      3 vaginal pregnancies, 3 children   • HEEL SPUR SURGERY Left 5/16/2024    LEFT FOOT REPAIR ACHILLES TENDON AND REMOVE HEEL SPUR performed by Sandra Lindsay DPM at [facility] MAIN OR   • OTHER SURGICAL HISTORY Right 2014    Achilles tendon repair       Social History:    Social History     Tobacco Use   • Smoking status: Never   • Smokeless tobacco: Never   Substance Use Topics   • Alcohol use: Not Currently     Alcohol/week: 10.0 standard drinks of alcohol     Types: 10 Drinks containing 0.5 oz of alcohol per week     Comment: Per year                                Counseling given: Not Answered      Vital Signs (Current): There were no vitals filed for this visit.                                           BP Readings from Last 3 Encounters:   12/23/24 97/63   08/16/24 124/80   05/16/24 (!) 130/55       NPO Status:                                                                                 BMI:   Wt Readings from Last 3 Encounters:   12/23/24 115.9 kg (255 lb 9.6 oz)   08/19/24 120.7 kg (266 lb)   08/16/24 120.7 kg (266 lb)     There is no height or weight on file to calculate BMI.    CBC:   Lab Results   Component Value Date/Time    WBC 8.0 08/05/2024 09:58 AM    RBC 4.27 08/05/2024 09:58 AM    HGB 11.2 08/05/2024 09:58 AM    HCT 35.5 08/05/2024 09:58 AM    MCV 83.1 08/05/2024 09:58 AM    RDW 14.3 08/05/2024 09:58 AM     08/05/2024 09:58 AM       CMP:   Lab Results   Component Value Date/Time     08/05/2024 09:58 AM    K

## 2025-01-23 ENCOUNTER — HOSPITAL ENCOUNTER (OUTPATIENT)
Facility: HOSPITAL | Age: 65
Setting detail: OUTPATIENT SURGERY
Discharge: HOME | End: 2025-01-23
Attending: STUDENT IN AN ORGANIZED HEALTH CARE EDUCATION/TRAINING PROGRAM | Admitting: STUDENT IN AN ORGANIZED HEALTH CARE EDUCATION/TRAINING PROGRAM
Payer: MEDICARE

## 2025-01-23 ENCOUNTER — ANESTHESIA (OUTPATIENT)
Facility: HOSPITAL | Age: 65
End: 2025-01-23
Payer: MEDICARE

## 2025-01-23 VITALS
SYSTOLIC BLOOD PRESSURE: 96 MMHG | TEMPERATURE: 97.3 F | WEIGHT: 260.2 LBS | HEART RATE: 65 BPM | BODY MASS INDEX: 44.42 KG/M2 | HEIGHT: 64 IN | OXYGEN SATURATION: 99 % | RESPIRATION RATE: 14 BRPM | DIASTOLIC BLOOD PRESSURE: 60 MMHG

## 2025-01-23 PROCEDURE — 6360000002 HC RX W HCPCS: Performed by: ANESTHESIOLOGY

## 2025-01-23 PROCEDURE — 3600007502: Performed by: STUDENT IN AN ORGANIZED HEALTH CARE EDUCATION/TRAINING PROGRAM

## 2025-01-23 PROCEDURE — 7100000011 HC PHASE II RECOVERY - ADDTL 15 MIN: Performed by: STUDENT IN AN ORGANIZED HEALTH CARE EDUCATION/TRAINING PROGRAM

## 2025-01-23 PROCEDURE — 2580000003 HC RX 258: Performed by: STUDENT IN AN ORGANIZED HEALTH CARE EDUCATION/TRAINING PROGRAM

## 2025-01-23 PROCEDURE — 3700000001 HC ADD 15 MINUTES (ANESTHESIA): Performed by: STUDENT IN AN ORGANIZED HEALTH CARE EDUCATION/TRAINING PROGRAM

## 2025-01-23 PROCEDURE — 2709999900 HC NON-CHARGEABLE SUPPLY: Performed by: STUDENT IN AN ORGANIZED HEALTH CARE EDUCATION/TRAINING PROGRAM

## 2025-01-23 PROCEDURE — 7100000010 HC PHASE II RECOVERY - FIRST 15 MIN: Performed by: STUDENT IN AN ORGANIZED HEALTH CARE EDUCATION/TRAINING PROGRAM

## 2025-01-23 PROCEDURE — 3600007512: Performed by: STUDENT IN AN ORGANIZED HEALTH CARE EDUCATION/TRAINING PROGRAM

## 2025-01-23 PROCEDURE — 3700000000 HC ANESTHESIA ATTENDED CARE: Performed by: STUDENT IN AN ORGANIZED HEALTH CARE EDUCATION/TRAINING PROGRAM

## 2025-01-23 RX ORDER — SODIUM CHLORIDE 0.9 % (FLUSH) 0.9 %
5-40 SYRINGE (ML) INJECTION EVERY 12 HOURS SCHEDULED
Status: DISCONTINUED | OUTPATIENT
Start: 2025-01-23 | End: 2025-01-23 | Stop reason: HOSPADM

## 2025-01-23 RX ORDER — SODIUM CHLORIDE 9 MG/ML
INJECTION, SOLUTION INTRAVENOUS PRN
Status: DISCONTINUED | OUTPATIENT
Start: 2025-01-23 | End: 2025-01-23 | Stop reason: HOSPADM

## 2025-01-23 RX ORDER — SODIUM CHLORIDE 0.9 % (FLUSH) 0.9 %
5-40 SYRINGE (ML) INJECTION PRN
Status: DISCONTINUED | OUTPATIENT
Start: 2025-01-23 | End: 2025-01-23 | Stop reason: HOSPADM

## 2025-01-23 RX ORDER — SODIUM CHLORIDE 9 MG/ML
INJECTION, SOLUTION INTRAVENOUS CONTINUOUS
Status: DISCONTINUED | OUTPATIENT
Start: 2025-01-23 | End: 2025-01-23 | Stop reason: HOSPADM

## 2025-01-23 RX ORDER — LIDOCAINE HYDROCHLORIDE 20 MG/ML
INJECTION, SOLUTION EPIDURAL; INFILTRATION; INTRACAUDAL; PERINEURAL
Status: DISCONTINUED | OUTPATIENT
Start: 2025-01-23 | End: 2025-01-23 | Stop reason: SDUPTHER

## 2025-01-23 RX ADMIN — LIDOCAINE HYDROCHLORIDE 40 MG: 20 INJECTION, SOLUTION EPIDURAL; INFILTRATION; INTRACAUDAL; PERINEURAL at 13:51

## 2025-01-23 RX ADMIN — SODIUM CHLORIDE: 9 INJECTION, SOLUTION INTRAVENOUS at 13:50

## 2025-01-23 RX ADMIN — PROPOFOL 250 MG: 10 INJECTION, EMULSION INTRAVENOUS at 14:16

## 2025-01-23 ASSESSMENT — PAIN - FUNCTIONAL ASSESSMENT: PAIN_FUNCTIONAL_ASSESSMENT: 0-10

## 2025-01-23 NOTE — PERIOP NOTE
ARRIVAL INFORMATION:  Verified patient name and date of birth, scheduled procedure, and informed consent.     : Chaz evans contact number: [phone]  Physician and staff can share information with the .     Receive texts: yes    Belongings with patient include:  Clothing,None    GI FOCUSED ASSESSMENT:  Neuro: Awake, alert, oriented x4  Respiratory: even and unlabored   GI: soft and non-distended  EKG Rhythm: normal sinus rhythm    Education:Reviewed general discharge instructions and  information.     Scope pre cleaned by Holger HAIR

## 2025-01-23 NOTE — ANESTHESIA POSTPROCEDURE EVALUATION
Department of Anesthesiology  Postprocedure Note    Patient: Fallon Dowd  MRN: [id number]  YOB: 1960  Date of evaluation: 1/23/2025    Procedure Summary       Date: 01/23/25 Room / Location: [facility] ENDO 03 / [facility] ENDOSCOPY    Anesthesia Start: 1349 Anesthesia Stop: 1421    Procedure: COLONOSCOPY (Lower GI Region) Diagnosis:       Colon cancer screening      (Colon cancer screening [Z12.11])    Surgeons: Jaylin Larsen DO Responsible Provider: Alannah Weeks DO    Anesthesia Type: MAC ASA Status: 3            Anesthesia Type: No value filed.    Yolis Phase I: Yolis Score: 10    Yolis Phase II:      Anesthesia Post Evaluation    Patient location during evaluation: PACU  Patient participation: complete - patient participated  Level of consciousness: sleepy but conscious and responsive to verbal stimuli  Pain score: 0  Airway patency: patent  Nausea & Vomiting: no vomiting and no nausea  Cardiovascular status: blood pressure returned to baseline and hemodynamically stable  Respiratory status: acceptable  Hydration status: stable    No notable events documented.

## 2025-01-23 NOTE — H&P
[facility]  SHERRON Larsen D.O.  [phone]          Pre-procedure History and Physical       NAME:  Fallon Dowd   :   1960   MRN:   [id number]     CHIEF COMPLAINT/HPI:     64 y.o. female here for colonoscopy for screening colonoscopy  Last colonoscopy: N/A    PMH:  Past Medical History:   Diagnosis Date    Allergic rhinitis     Seasonal    Anxiety     Work related    Arthritis     Knees    Asthma     Environmental allergies     Hives     HTN (hypertension) 2013    Hypercholesterolemia     Obesity     Osteoarthritis     Left foot       PSH:  Past Surgical History:   Procedure Laterality Date    CARPAL TUNNEL RELEASE Bilateral     EYE SURGERY Bilateral     lasik    GYN      3 vaginal pregnancies, 3 children    HEEL SPUR SURGERY Left 2024    LEFT FOOT REPAIR ACHILLES TENDON AND REMOVE HEEL SPUR performed by Sandra Lindsay DPM at [facility] MAIN OR    OTHER SURGICAL HISTORY Right     Achilles tendon repair       Allergies:  Allergies   Allergen Reactions    Sulfa Antibiotics Anaphylaxis    Amlodipine Angioedema    Asparagus Hives and Itching    Other      Shrimp        Home Medications:  Prior to Admission Medications   Prescriptions Last Dose Informant Patient Reported? Taking?   Cetirizine HCl (ZYRTEC ALLERGY) 10 MG CAPS   Yes No   Sig: Take by mouth as needed   DULoxetine (CYMBALTA) 60 MG extended release capsule 2025  No Yes   Sig: Take 1 capsule by mouth 2 times daily   Vitamin D, Ergocalciferol, 50 MCG (2000 UT) CAPS   No No   Sig: Take 1 capsule by mouth daily   albuterol sulfate HFA (PROVENTIL;VENTOLIN;PROAIR) 108 (90 Base) MCG/ACT inhaler Past Month  Yes Yes   Sig: INHALE 2 PUFFS BY MOUTH EVERY 6 HOURS AS NEEDED FOR WHEEZING   diclofenac (VOLTAREN) 75 MG EC tablet 2025  No Yes   Sig: Take 1 tablet by mouth 2 times daily   hydrALAZINE (APRESOLINE) 25 MG tablet 2025  No Yes   Sig: Take 1 tablet by mouth 3 times daily

## 2025-01-23 NOTE — DISCHARGE SUMMARY
[facility]  SHERRON Larsen D.O.  [phone]            COLONOSCOPY DISCHARGE INSTRUCTIONS    Fallon Dowd  [id number]  1960    DISCOMFORT:  Redness at IV site- apply warm compress to area; if redness or soreness persist- contact your physician  There may be a slight amount of blood passed from the rectum  Gaseous discomfort- walking, belching will help relieve any discomfort  You may not operate a vehicle for 12 hours  You may not engage in an occupation involving machinery or appliances for the  rest of today  You may not drink alcoholic beverages for at least 12 hours  Avoid making any critical decisions for at least 24 hours    DIET:   You may resume your normal diet, but some patients find that heavy or large meals may lead to indigestion or vomiting. I suggest a light meal as first food intake.    ACTIVITY:  You may resume your normal daily activities.  It is recommended that you spend the remainder of the day resting - avoid any strenuous activity.    CALL M.D. IF ANY SIGN OF:   Increasing pain, nausea, vomiting  Abdominal distension (swelling)  Significant bleeding (oral or rectal)  Fever   Pain in chest area  Shortness of breath    Additional Instructions:   Call Dr. Larsen if any questions or problems at [phone]   You should receive the biopsy results by phone or mail within 3 weeks, if not, call  my office for the results      Colonoscopy was normal. All findings such as hemorrhoids/diverticulosis are benign/normal findings.    Should have a repeat colonoscopy in 10 years.    Resume regular medications    Follow-up at your convenience.      SHERRON Larsen D.O.  [facility]

## 2025-01-23 NOTE — DISCHARGE INSTRUCTIONS
[facility]  SHERRON Larsen D.O.  [phone]            COLONOSCOPY DISCHARGE INSTRUCTIONS    Fallon Dowd  [id number]  1960      CALL MADRIENNE IF ANY SIGN OF:   Increasing pain, nausea, vomiting  Abdominal distension (swelling)  Significant bleeding (oral or rectal)  Fever   Pain in chest area  Shortness of breath    For 24 hours after general anesthesia or intravenous analgesia / sedation  you may experience:  Drowsiness, dizziness, sleepiness, or confusion  Difficulty remembering or delayed reaction times  Difficulty with your balance, especially while walking, move slowly and carefully, do not make sudden position changes  Difficulty focusing or blurred vision    DISCOMFORT:  Redness at IV site- apply warm compress to area; if redness or soreness persist- contact your physician  There may be a slight amount of blood passed from the rectum  Gaseous discomfort- walking, belching will help relieve any discomfort      DIET:   You may resume your normal diet, but some patients find that heavy or large meals may lead to indigestion or vomiting. I suggest a light meal as first food intake.    ACTIVITY:  You may resume your normal daily activities.  It is recommended that you spend the remainder of the day resting - avoid any strenuous activity.  You may not operate a vehicle for 12 hours  You may not engage in an occupation involving machinery or appliances for the  rest of today  You may not drink alcoholic beverages for at least 12 hours  Avoid making any critical decisions for at least 24 hours    Additional Instructions:   Call Dr. Larsen if any questions or problems at [phone]   You should receive the biopsy results by phone or mail within 3 weeks, if not, call  my office for the results      Colonoscopy was normal. All findings such as hemorrhoids/diverticulosis are benign/normal findings.    Should have a repeat colonoscopy in 10 years.    Resume regular

## 2025-01-23 NOTE — OP NOTE
NAME:  Fallon Dowd   :   1960   MRN:   [id number]     Date/Time:  2025 2:16 PM    Colonoscopy Operative Report    Procedure Type:   Colonoscopy --screening     Indications:    Screening  Pre-operative Diagnosis: see indication above  Post-operative Diagnosis:  See findings below  :  SHERRON Larsen D.O.  Referring Provider: -Awa Betancourt MD    Exam:  Airway: clear, no airway problems anticipated  Heart: RRR, without gallops or rubs  Lungs: clear bilaterally without wheezes, crackles, or rhonchi  Abdomen: soft, nontender, nondistended, bowel sounds present  Mental Status: awake, alert and oriented to person, place and time    Sedation:  MAC anesthesia Propofol  Procedure Details:  After informed consent was obtained with all risks and benefits of procedure explained and preoperative exam completed, the patient was taken to the endoscopy suite and placed in the left lateral decubitus position.  Upon sequential sedation as per above, a digital rectal exam was performed which was normal.  The Olympus videocolonoscope  was inserted in the rectum and carefully advanced to the terminal ileum w/ identification of the ileocecal valve and appendiceal orifice.   The quality of preparation was good.  The colonoscope was slowly withdrawn with careful evaluation between folds. Retroflexion in the rectum was completed demonstrating internal hemorrhoids.     Findings:   Anus/Queenie-anal exam:  Normal  Rectum:       -Protruding lesions:     -Internal Hemorrhoids - grade II  Sigmoid:   Normal  Mild diverticulosis.   Descending Colon:   Normal  Transverse Colon:   Normal  Ascending Colon:   Normal  Cecum:   Normal  Terminal Ileum:   Normal    Specimen Removed:    * No specimens in log *    Complications: None.   EBL:  None.    Impression:    Normal colonoscopy exam to the terminal ileum with identification of the IC valve and appendiceal orifice as cecal landmarks  Second look/retroflexion performed

## 2025-01-27 ENCOUNTER — PATIENT MESSAGE (OUTPATIENT)
Facility: CLINIC | Age: 65
End: 2025-01-27

## 2025-01-27 DIAGNOSIS — E66.01 MORBID OBESITY WITH BMI OF 45.0-49.9, ADULT: Primary | ICD-10-CM

## 2025-01-30 PROBLEM — M17.11 OSTEOARTHRITIS OF RIGHT KNEE: Status: ACTIVE | Noted: 2025-01-30

## 2025-02-07 ENCOUNTER — PATIENT MESSAGE (OUTPATIENT)
Facility: CLINIC | Age: 65
End: 2025-02-07

## 2025-02-07 DIAGNOSIS — E66.01 MORBID OBESITY WITH BMI OF 45.0-49.9, ADULT: Primary | ICD-10-CM

## 2025-02-19 DIAGNOSIS — E66.01 MORBID OBESITY WITH BMI OF 45.0-49.9, ADULT: ICD-10-CM

## 2025-02-19 NOTE — TELEPHONE ENCOUNTER
PCP: Awa Betancourt MD     Last appt:  12/23/2024      Future Appointments   Date Time Provider Department Center   3/14/2025 10:30 AM Awa Betancourt MD [facility]   4/23/2025  2:30 PM LAB [facility]   4/30/2025  2:00 PM Awa Betancourt MD [facility] DEP          Requested Prescriptions     Pending Prescriptions Disp Refills    naltrexone-buPROPion (CONTRAVE) 8-90 MG per extended release tablet 60 tablet 1     Sig: Take 1 tablet by mouth once daily for 2 weeks, then take 1 tablet twice a day.

## 2025-02-26 DIAGNOSIS — E66.01 MORBID OBESITY WITH BMI OF 45.0-49.9, ADULT: ICD-10-CM

## 2025-02-26 NOTE — TELEPHONE ENCOUNTER
PCP: Awa Betancourt MD     Last appt:  12/23/2024      Future Appointments   Date Time Provider Department Center   3/14/2025 10:30 AM Awa Betancourt MD [facility] DEP   3/20/2025  1:30 PM [facility] PAT EXAM RM 1 [facility]   4/23/2025  2:30 PM LAB [facility] DEP   4/30/2025  2:00 PM Awa Betancourt MD [facility]          Requested Prescriptions     Pending Prescriptions Disp Refills    naltrexone-buPROPion (CONTRAVE) 8-90 MG per extended release tablet 60 tablet 2     Sig: Take 1 tablet by mouth once daily for 2 weeks, then take 1 tablet twice a day.

## 2025-03-11 DIAGNOSIS — E66.01 MORBID OBESITY WITH BMI OF 45.0-49.9, ADULT: ICD-10-CM

## 2025-03-14 ENCOUNTER — OFFICE VISIT (OUTPATIENT)
Facility: CLINIC | Age: 65
End: 2025-03-14

## 2025-03-14 VITALS
BODY MASS INDEX: 44.76 KG/M2 | HEIGHT: 64 IN | HEART RATE: 62 BPM | DIASTOLIC BLOOD PRESSURE: 70 MMHG | SYSTOLIC BLOOD PRESSURE: 120 MMHG | RESPIRATION RATE: 16 BRPM | OXYGEN SATURATION: 96 % | WEIGHT: 262.2 LBS | TEMPERATURE: 98.2 F

## 2025-03-14 DIAGNOSIS — I10 HYPERTENSION, ESSENTIAL, BENIGN: ICD-10-CM

## 2025-03-14 DIAGNOSIS — Z01.818 PRE-OP EVALUATION: Primary | ICD-10-CM

## 2025-03-14 DIAGNOSIS — F32.1 CURRENT MODERATE EPISODE OF MAJOR DEPRESSIVE DISORDER WITHOUT PRIOR EPISODE (HCC): ICD-10-CM

## 2025-03-14 DIAGNOSIS — M17.11 PRIMARY OSTEOARTHRITIS OF RIGHT KNEE: ICD-10-CM

## 2025-03-14 SDOH — ECONOMIC STABILITY: FOOD INSECURITY: WITHIN THE PAST 12 MONTHS, YOU WORRIED THAT YOUR FOOD WOULD RUN OUT BEFORE YOU GOT MONEY TO BUY MORE.: NEVER TRUE

## 2025-03-14 SDOH — ECONOMIC STABILITY: FOOD INSECURITY: WITHIN THE PAST 12 MONTHS, THE FOOD YOU BOUGHT JUST DIDN'T LAST AND YOU DIDN'T HAVE MONEY TO GET MORE.: NEVER TRUE

## 2025-03-14 ASSESSMENT — PATIENT HEALTH QUESTIONNAIRE - PHQ9
4. FEELING TIRED OR HAVING LITTLE ENERGY: NOT AT ALL
9. THOUGHTS THAT YOU WOULD BE BETTER OFF DEAD, OR OF HURTING YOURSELF: NOT AT ALL
10. IF YOU CHECKED OFF ANY PROBLEMS, HOW DIFFICULT HAVE THESE PROBLEMS MADE IT FOR YOU TO DO YOUR WORK, TAKE CARE OF THINGS AT HOME, OR GET ALONG WITH OTHER PEOPLE: NOT DIFFICULT AT ALL
6. FEELING BAD ABOUT YOURSELF - OR THAT YOU ARE A FAILURE OR HAVE LET YOURSELF OR YOUR FAMILY DOWN: NOT AT ALL
3. TROUBLE FALLING OR STAYING ASLEEP: NOT AT ALL
2. FEELING DOWN, DEPRESSED OR HOPELESS: SEVERAL DAYS
SUM OF ALL RESPONSES TO PHQ QUESTIONS 1-9: 1
8. MOVING OR SPEAKING SO SLOWLY THAT OTHER PEOPLE COULD HAVE NOTICED. OR THE OPPOSITE, BEING SO FIGETY OR RESTLESS THAT YOU HAVE BEEN MOVING AROUND A LOT MORE THAN USUAL: NOT AT ALL
SUM OF ALL RESPONSES TO PHQ QUESTIONS 1-9: 1
1. LITTLE INTEREST OR PLEASURE IN DOING THINGS: NOT AT ALL
SUM OF ALL RESPONSES TO PHQ QUESTIONS 1-9: 1
7. TROUBLE CONCENTRATING ON THINGS, SUCH AS READING THE NEWSPAPER OR WATCHING TELEVISION: NOT AT ALL
5. POOR APPETITE OR OVEREATING: NOT AT ALL
SUM OF ALL RESPONSES TO PHQ QUESTIONS 1-9: 1

## 2025-03-14 NOTE — PROGRESS NOTES
Fallon Dowd  Identified pt with two pt identifiers(name and ).  Chief Complaint   Patient presents with    Pre-op Exam    Ear Pain     Right        1. Have you been to the ER, urgent care clinic since your last visit?  Hospitalized since your last visit? NO    2. Have you seen or consulted any other health care providers outside of the [facility] since your last visit?  Include any pap smears or colon screening. NO      Provider notified of reason for visit, vitals and flowsheets obtained on patients.     Patient received paperwork for advance directive during previous visit but has not completed at this time     Reviewed record In preparation for visit, huddled with provider and have obtained necessary documentation      Health Maintenance Due   Topic    DEXA (modify frequency per FRAX score)     Respiratory Syncytial Virus (RSV) Pregnant or age 60 yrs+ (1 - Risk 60-74 years 1-dose series)    Breast cancer screen     DTaP/Tdap/Td vaccine (2 - Td or Tdap)    COVID-19 Vaccine ( season)    Annual Wellness Visit (Medicare)     Depression Monitoring        Wt Readings from Last 3 Encounters:   25 118.9 kg (262 lb 3.2 oz)   25 117.9 kg (260 lb)   25 118 kg (260 lb 3.2 oz)     Temp Readings from Last 3 Encounters:   25 98.2 °F (36.8 °C) (Oral)   25 97.3 °F (36.3 °C) (Temporal)   24 97.5 °F (36.4 °C) (Oral)     BP Readings from Last 3 Encounters:   25 120/70   25 96/60   24 97/63     Pulse Readings from Last 3 Encounters:   25 62   25 65   24 72          No data to display                  Learning Assessment:  :         1/3/2024     1:00 PM   [facility] AMB LEARNING ASSESSMENT   Primary Learner Patient   level of education 2 YEARS OF COLLEGE   Barriers Factors NONE   Primary Language ENGLISH   Learning Preference DEMONSTRATION   Answered By Patient   Relationship to Learner SELF       Fall Risk Assessment:  :         
release capsule Take 1 capsule by mouth 2 times daily 180 capsule 2    Vitamin D, Ergocalciferol, 50 MCG (2000 UT) CAPS Take 1 capsule by mouth daily      albuterol sulfate HFA (PROVENTIL;VENTOLIN;PROAIR) 108 (90 Base) MCG/ACT inhaler INHALE 2 PUFFS BY MOUTH EVERY 6 HOURS AS NEEDED FOR WHEEZING      Cetirizine HCl (ZYRTEC ALLERGY) 10 MG CAPS Take by mouth as needed      naproxen sodium (ANAPROX) 220 MG tablet Take 1 tablet by mouth daily      tirzepatide-weight management (ZEPBOUND) 15 MG/0.5ML SOAJ subCUTAneous auto-injector pen INJECT THE CONTENTS OF ONE PEN UNDER THE SKIN ONCE WEEKLY. (Patient taking differently: INJECT THE CONTENTS OF ONE PEN UNDER THE SKIN ONCE WEEKLY Saturdays) 2 mL 3    diclofenac (VOLTAREN) 75 MG EC tablet Take 1 tablet by mouth 2 times daily 60 tablet 0     No current facility-administered medications for this visit.       PHYSICAL EXAM  /70 (BP Site: Right Upper Arm, Patient Position: Sitting)   Pulse 62   Temp 98.2 °F (36.8 °C) (Oral)   Resp 16   Ht 1.626 m (5' 4\")   Wt 118.9 kg (262 lb 3.2 oz)   SpO2 96%   BMI 45.01 kg/m²     General: Obese, no distress.  HEENT:  Head normocephalic/atraumatic, no scleral icterus  Neck: Supple. No carotid bruits, JVD, lymphadenopathy, or thyromegaly.  Lungs:  Clear to auscultation bilaterally. Good air movement.  Heart:  Regular rate and rhythm, normal S1 and S2, no murmur, gallop, or rub  Abdomen: Soft, obese, normal bowel sounds, no tenderness, no guarding or masses.  Extremities: No clubbing, cyanosis, or edema. Normal ROM with mild crepitus at both knees.    Neurological: Alert and oriented. No focal deficits.  Psychiatric: Normal mood and affect. Behavior is normal.          ASSESSMENT AND PLAN      ICD-10-CM    1. Pre-op evaluation  Z01.818       2. Hypertension, essential, benign  I10       3. Primary osteoarthritis of right knee  M17.11       4. Current moderate episode of major depressive disorder without prior episode (HCC)  F32.1

## 2025-03-20 ENCOUNTER — HOSPITAL ENCOUNTER (OUTPATIENT)
Facility: HOSPITAL | Age: 65
Discharge: HOME | End: 2025-03-23
Payer: MEDICARE

## 2025-03-20 VITALS
DIASTOLIC BLOOD PRESSURE: 74 MMHG | HEIGHT: 63 IN | WEIGHT: 257.28 LBS | RESPIRATION RATE: 18 BRPM | TEMPERATURE: 98.4 F | OXYGEN SATURATION: 97 % | HEART RATE: 59 BPM | SYSTOLIC BLOOD PRESSURE: 115 MMHG | BODY MASS INDEX: 45.59 KG/M2

## 2025-03-20 LAB
ABO + RH BLD: NORMAL
ANION GAP SERPL CALC-SCNC: 6 MMOL/L (ref 2–12)
APPEARANCE UR: CLEAR
BACTERIA URNS QL MICRO: NEGATIVE /HPF
BILIRUB UR QL: NEGATIVE
BLOOD GROUP ANTIBODIES SERPL: NORMAL
BUN SERPL-MCNC: 20 MG/DL (ref 6–20)
BUN/CREAT SERPL: 22 (ref 12–20)
CALCIUM SERPL-MCNC: 9.6 MG/DL (ref 8.5–10.1)
CHLORIDE SERPL-SCNC: 101 MMOL/L (ref 97–108)
CO2 SERPL-SCNC: 31 MMOL/L (ref 21–32)
COLOR UR: ABNORMAL
CREAT SERPL-MCNC: 0.93 MG/DL (ref 0.55–1.02)
EKG ATRIAL RATE: 62 BPM
EKG DIAGNOSIS: NORMAL
EKG P AXIS: 48 DEGREES
EKG P-R INTERVAL: 166 MS
EKG Q-T INTERVAL: 396 MS
EKG QRS DURATION: 74 MS
EKG QTC CALCULATION (BAZETT): 401 MS
EKG R AXIS: 17 DEGREES
EKG T AXIS: 25 DEGREES
EKG VENTRICULAR RATE: 62 BPM
EPITH CASTS URNS QL MICRO: ABNORMAL /LPF
ERYTHROCYTE [DISTWIDTH] IN BLOOD BY AUTOMATED COUNT: 14.5 % (ref 11.5–14.5)
EST. AVERAGE GLUCOSE BLD GHB EST-MCNC: 114 MG/DL
GLUCOSE SERPL-MCNC: 100 MG/DL (ref 65–100)
GLUCOSE UR STRIP.AUTO-MCNC: NEGATIVE MG/DL
HBA1C MFR BLD: 5.6 % (ref 4–5.6)
HCT VFR BLD AUTO: 34.9 % (ref 35–47)
HGB BLD-MCNC: 10.9 G/DL (ref 11.5–16)
HGB UR QL STRIP: NEGATIVE
INR PPP: 1 (ref 0.9–1.1)
KETONES UR QL STRIP.AUTO: NEGATIVE MG/DL
LEUKOCYTE ESTERASE UR QL STRIP.AUTO: ABNORMAL
MCH RBC QN AUTO: 25.2 PG (ref 26–34)
MCHC RBC AUTO-ENTMCNC: 31.2 G/DL (ref 30–36.5)
MCV RBC AUTO: 80.6 FL (ref 80–99)
NITRITE UR QL STRIP.AUTO: NEGATIVE
NRBC # BLD: 0 K/UL (ref 0–0.01)
NRBC BLD-RTO: 0 PER 100 WBC
PH UR STRIP: 5.5 (ref 5–8)
PLATELET # BLD AUTO: 274 K/UL (ref 150–400)
PMV BLD AUTO: 9.3 FL (ref 8.9–12.9)
POTASSIUM SERPL-SCNC: 4 MMOL/L (ref 3.5–5.1)
PROT UR STRIP-MCNC: NEGATIVE MG/DL
PROTHROMBIN TIME: 10.6 SEC (ref 9.2–11.2)
RBC # BLD AUTO: 4.33 M/UL (ref 3.8–5.2)
RBC #/AREA URNS HPF: ABNORMAL /HPF (ref 0–5)
SODIUM SERPL-SCNC: 138 MMOL/L (ref 136–145)
SP GR UR REFRACTOMETRY: 1.01 (ref 1–1.03)
SPECIMEN EXP DATE BLD: NORMAL
URINE CULTURE IF INDICATED: ABNORMAL
UROBILINOGEN UR QL STRIP.AUTO: 0.2 EU/DL (ref 0.2–1)
WBC # BLD AUTO: 8.1 K/UL (ref 3.6–11)
WBC URNS QL MICRO: ABNORMAL /HPF (ref 0–4)

## 2025-03-20 PROCEDURE — 81001 URINALYSIS AUTO W/SCOPE: CPT

## 2025-03-20 PROCEDURE — 85610 PROTHROMBIN TIME: CPT

## 2025-03-20 PROCEDURE — 86850 RBC ANTIBODY SCREEN: CPT

## 2025-03-20 PROCEDURE — 93010 ELECTROCARDIOGRAM REPORT: CPT | Performed by: INTERNAL MEDICINE

## 2025-03-20 PROCEDURE — 86900 BLOOD TYPING SEROLOGIC ABO: CPT

## 2025-03-20 PROCEDURE — 93005 ELECTROCARDIOGRAM TRACING: CPT | Performed by: ORTHOPAEDIC SURGERY

## 2025-03-20 PROCEDURE — 83036 HEMOGLOBIN GLYCOSYLATED A1C: CPT

## 2025-03-20 PROCEDURE — 85027 COMPLETE CBC AUTOMATED: CPT

## 2025-03-20 PROCEDURE — 86901 BLOOD TYPING SEROLOGIC RH(D): CPT

## 2025-03-20 PROCEDURE — 80048 BASIC METABOLIC PNL TOTAL CA: CPT

## 2025-03-20 ASSESSMENT — PAIN SCALES - GENERAL: PAINLEVEL_OUTOF10: 6

## 2025-03-20 ASSESSMENT — PAIN DESCRIPTION - ORIENTATION: ORIENTATION: RIGHT

## 2025-03-20 ASSESSMENT — PAIN DESCRIPTION - LOCATION: LOCATION: KNEE

## 2025-03-20 ASSESSMENT — PAIN DESCRIPTION - DESCRIPTORS: DESCRIPTORS: ACHING

## 2025-03-20 NOTE — PERIOP NOTE
[address] [address]     MAIN PRE OP             [phone]                                                                                AMBULATORY PRE OP          [phone]    PRE-ADMISSION TESTING    [phone]     Surgery Date:  04-       [facility] between 4 and 7pm the day before your surgery with your arrival time. (If your surgery is on a Monday, we will call you the Friday before.)    Call [phone] after 7pm Monday-Friday if you did not receive this call.    INSTRUCTIONS BEFORE YOUR SURGERY   When You  Arrive Arrive at [facility] Patient Access on 1st floor the day of your surgery.  Have your insurance card, photo ID,living will/advanced directive/POA (if applicable),  and any copayment (if needed)   Food   and   Drink NO solid food after midnight the night before surgery. You can drink clear liquids from midnight until ONE hour prior to your arrival at the hospital on the day of your surgery. Clear liquids include:  Water  Apple juice (no sediment)  Carbonated beverages  Black coffee(no cream/milk)  Tea(no cream/milk)  Gatorade    No alcohol (beer, wine, liquor) or marijuana (smoking) 24 hours prior to surgery.   No edibles for 3 days prior to surgery.    Stop smoking cigarettes 14 days before surgery (helps w/healing and breathing).   Medications to   TAKE   Morning of Surgery MEDICATIONS TO TAKE THE MORNING OF SURGERY WITH A SIP OF WATER: Cymbalta, Hydralazine    You may take these medications, IF NEEDED, the morning of surgery:     Ask your surgeon/prescribing doctor for instructions on taking or stopping these medications prior to surgery: Stop Contrave  on 03-   Medications to STOP  before surgery Non-Steroidal anti-inflammatory Drugs (NSAID's): for example, Diclofenac(Voltaren),  Ibuprofen (Advil, Motrin), Naproxen (Aleve) 3 days    STOP all herbal supplements and vitamins(unless  morning dose until after the COVID test has been performed.        Follow all instructions so your surgery won’t be cancelled.  Please, be on time.                    If a situation occurs and you are delayed the day of surgery, call [phone]/ [phone].    If your physical condition changes (like a fever, cold, flu, etc.) call your surgeon as soon as possible.    Home medication(s) reviewed and verified via during PAT appointment/call.    The patient was contacted in person.     She verbalized understanding of all instructions does not need reinforcement.

## 2025-03-21 LAB
BACTERIA SPEC CULT: NORMAL
BACTERIA SPEC CULT: NORMAL
SERVICE CMNT-IMP: NORMAL

## 2025-03-24 RX ORDER — MUPIROCIN 20 MG/G
OINTMENT TOPICAL 2 TIMES DAILY
Qty: 22 G | Refills: 0 | Status: SHIPPED | OUTPATIENT
Start: 2025-03-24 | End: 2025-03-29

## 2025-03-24 NOTE — PERIOP NOTE
EKG from 3/20/25 reviewed by Dr. Pugh, anesthesiologist and compared with previous EKG from 5/1/24. Planned procedure, PMHx, functional status reviewed. Pt ok to proceed with planned procedure per Dr. Pugh.

## 2025-03-24 NOTE — PERIOP NOTE
PC to pt, full name and  verified, regarding positive nasal cx (MSSA) and need to start Mupirocin ointment BID x 5 days to B nostrils starting today and bathe with CHG soap for 5 days prior to surgery. Pt verbalized understanding of instructions and will start today as recommended. Allergies and pharmacy of choice reviewed. Rx escribed to pt's pharmacy of choice.  PTA medlist updated. Surgeon and PCP notified of positive culture and treatment.     PRESCRIPTION:    MUPIROCIN 2% OINTMENT  QUANTITY:  #22 GRAMS  REFILLS: NONE    Apply 0.25 g (small pea-sized amount) to both nostrils twice a day for five days.    ZEYNEP FORD - NP

## 2025-03-26 NOTE — PROGRESS NOTES
KNEE DISABILITY OSTEOARTHRITIS AND OUTCOME SCORE    Stiffness - The following question concerns the amount of joing stiffness you have experienced during the last week in your knee. Stiffness is a sensation of restriction or slowness in the ease with which you move your knee joint.  How severe is your knee stiffness after first wakening in the morning?: (Patient-Rptd) 3        Pain - What amount of knee pain have you experienced the last week during the following activities?  Twisting/pivoting on your knee: (Patient-Rptd) 4  Straightening knee fully: (Patient-Rptd) 3  Going up or down stairs: (Patient-Rptd) 4  Standing upright: (Patient-Rptd) 2        Function - Please indicate the degree of difficulty you have experienced in the last week due to your knee.  Rising from sitting: (Patient-Rptd) 3  Bending to floor/ an object: (Patient-Rptd) 3        Raw Score  Jr MOSES. Knee Survey Score: (Patient-Rptd) 22  KOOS JR Total Interval Score (0-100 Scale): (Patient-Rptd) 31.307      PROMIS Questions    In general, would you say your health is:: (Patient-Rptd) 3    In general, would you say your quality of life is:: (Patient-Rptd) 2    In general, how would you rate your physical health?: (Patient-Rptd) 2    In general, how would you rate your mental health, including your mood and your ability to think?: (Patient-Rptd) 4    To what extent are you able to carry out your everyday physical activities such as walking, climbing stairs, carrying groceries, or moving a chair?: (Patient-Rptd) 2    In the past 7 days how often have you been bothered by emotional problems such as feeling anxious, depressed or irritable?: (Patient-Rptd) 3    In the past 7 days how would you rate your fatigue on average?: (Patient-Rptd) 4    In the past 7 days how would you rate your pain on average?: (Patient-Rptd) 8    In general, please rate how well you carry out your usual social activities and roles. (This includes activities at home, at  work and in your community, and responsibilities as a parent, child, spouse, employee, friend, etc.): (Patient-Rptd) 1    In general, how would you rate your satisfaction with your social activities and relationships?: (Patient-Rptd) 3    How comfortable are you filling out medical forms by yourself?: (Patient-Rptd) 4    What amount of pain have you experienced in the last week in your other knee/hip?: (Patient-Rptd) 3    My BACK PAIN at the moment is: (Patient-Rptd) 2    Have you taken chronic narcotics in the last 90 days?: (Patient-Rptd) 0      Mode of Collection: (Patient-Rptd) 2    Person Completing Survey: (Patient-Rptd) 0      Scores    PROMIS Physical Score: (Patient-Rptd) 16    PROMIS Mental Score: (Patient-Rptd) 12

## 2025-04-02 ENCOUNTER — ANESTHESIA EVENT (OUTPATIENT)
Facility: HOSPITAL | Age: 65
End: 2025-04-02
Payer: MEDICARE

## 2025-04-02 RX ORDER — GINSENG 100 MG
CAPSULE ORAL ONCE
Status: CANCELLED | OUTPATIENT
Start: 2025-04-02 | End: 2025-04-02

## 2025-04-02 NOTE — DISCHARGE INSTRUCTIONS
TOTAL KNEE REPLACEMENT POST-OPERATIVE INSTRUCTIONS  MD Gidla Cox PA      Contact information:    [facility] [place]: [phone]   [facility]: [phone]    Cookie Hurtado - Dr. Villavicencio’s medical assistant      FOLLOW-UP VISIT   Please call [phone] to make your first post-operative visit as soon as possible. This should be scheduled with Dr. Villavicencio, or his PA, Gilda Patrick, 7-10 days after your date of surgery.     THINGS TO EXPECT  You were given a nerve block that numbs your surgical leg for 24-48 hours.  You may experience some lingering numbness/tingling longer than this.  Bruising, swelling, and mild redness is common to the knee and lower leg after surgery.  Bruising is also common in the upper thigh secondary to use of the tourniquet during surgery.    MEDICATIONS/PAIN MANAGEMENT     Blood Clot (DVT/PE) Prevention   A prescription was sent for Coumadin (warfarin) to prevent blood clots unless we discussed otherwise. Take this at the same time every day, once a day, for four weeks. Do NOT take aspirin, ibuprofen, or other anti-inflammatory or blood thinning medications while you are taking Coumadin as this can increase your risk of bleeding.   Get up to take short walks frequently.  Pneumatic cuffs may have been provided from certain centers, please use as directed for the first week.  If you have calf pain or tenderness, excessive warmth, shortness of breath-please call office.    Pain Management   Your pain medication was sent 1-3 days before your surgery date, please take it as directed. Decrease dose and stop taking as soon as pain becomes tolerable.   You should apply ice to the knee a few times a day, or more if helpful for 20 minutes at a time to help with pain and swelling. Do not place ice directly onto the skin. Place it on top of a towel or blanket.   You should elevate the leg above the level of your heart multiple times a  surgical incision. You may re-adjust your ACE wrap as needed. You may change out the gauze and cast padding if there is any drainage. Leave the steri-strips on as they will come off on their own.   You may take a shower without covering up the incision and steri-strips once your incision is dry, approximately 5-7 days after surgery. Water may run over the incision, but do not submerge your knee under water.   Do not use creams, lotions, or ointments on the incision.  You may wear compression socks or Rui Hose if you have them during the day.     WHEN TO CONTACT YOUR DOCTOR     When to call your Orthopedic Surgeon:   If you have concerns, you may call the office [phone]. If you call after 5pm or on a weekend, the on-call physician will return your call. Common concerns to prompt phone call:  Pain is not relieved by pain mediation, ice, and activity modification   Signs of infection: red incision, continuous drainage from the incision, malodorous drainage, persistent fever greater than 101.1 degrees Fahrenheit   Signs of a blood clot: calf pain, tenderness, redness, and or swelling to the lower leg, chest pain, shortness of breath    When to call 911 or go the nearest Emergency room:   Acute onset of chest pain, shortness of breath, difficulty breathing     When to call your Primary care Physician:   Concerns about your medical conditions such as high blood pressure, asthma, congestive heart failure   Call if your blood sugars are elevated, have a persistent headache or dizziness, cough, congestion, constipation or diarrhea, burning with urination, abnormal slow or fast heart rate     HOME [facility]   Physical therapy - routine exercises, transfers, and gait training, two-three times a week for three weeks. Range of motion concentrating on terminal extension.  Home nursing - draw a pro-time every Wednesday for 4 weeks. Fax the results to Dr. Villavicencio at [phone]. Call results to Cookie at [phone] ext.

## 2025-04-03 ENCOUNTER — ANESTHESIA (OUTPATIENT)
Facility: HOSPITAL | Age: 65
End: 2025-04-03
Payer: MEDICARE

## 2025-04-03 ENCOUNTER — HOSPITAL ENCOUNTER (OUTPATIENT)
Facility: HOSPITAL | Age: 65
Setting detail: OBSERVATION
Discharge: HOME HEALTH | End: 2025-04-05
Attending: ORTHOPAEDIC SURGERY | Admitting: ORTHOPAEDIC SURGERY
Payer: MEDICARE

## 2025-04-03 DIAGNOSIS — Z96.651 HISTORY OF TOTAL KNEE ARTHROPLASTY, RIGHT: Primary | ICD-10-CM

## 2025-04-03 PROBLEM — M17.11 PRIMARY OSTEOARTHRITIS OF RIGHT KNEE: Status: ACTIVE | Noted: 2025-04-03

## 2025-04-03 LAB
GLUCOSE BLD STRIP.AUTO-MCNC: 105 MG/DL (ref 65–117)
INR PPP: 1 (ref 0.9–1.1)
PROTHROMBIN TIME: 10.9 SEC (ref 9.2–11.2)
SERVICE CMNT-IMP: NORMAL

## 2025-04-03 PROCEDURE — 6360000002 HC RX W HCPCS

## 2025-04-03 PROCEDURE — 2500000003 HC RX 250 WO HCPCS: Performed by: ORTHOPAEDIC SURGERY

## 2025-04-03 PROCEDURE — 6360000002 HC RX W HCPCS: Performed by: NURSE ANESTHETIST, CERTIFIED REGISTERED

## 2025-04-03 PROCEDURE — 2709999900 HC NON-CHARGEABLE SUPPLY: Performed by: ORTHOPAEDIC SURGERY

## 2025-04-03 PROCEDURE — 64447 NJX AA&/STRD FEMORAL NRV IMG: CPT | Performed by: ANESTHESIOLOGY

## 2025-04-03 PROCEDURE — 97116 GAIT TRAINING THERAPY: CPT

## 2025-04-03 PROCEDURE — 6360000002 HC RX W HCPCS: Performed by: ANESTHESIOLOGY

## 2025-04-03 PROCEDURE — 6370000000 HC RX 637 (ALT 250 FOR IP)

## 2025-04-03 PROCEDURE — 3700000000 HC ANESTHESIA ATTENDED CARE: Performed by: ORTHOPAEDIC SURGERY

## 2025-04-03 PROCEDURE — 7100000000 HC PACU RECOVERY - FIRST 15 MIN: Performed by: ORTHOPAEDIC SURGERY

## 2025-04-03 PROCEDURE — C1776 JOINT DEVICE (IMPLANTABLE): HCPCS | Performed by: ORTHOPAEDIC SURGERY

## 2025-04-03 PROCEDURE — 2580000003 HC RX 258

## 2025-04-03 PROCEDURE — 2580000003 HC RX 258: Performed by: ORTHOPAEDIC SURGERY

## 2025-04-03 PROCEDURE — 2500000003 HC RX 250 WO HCPCS

## 2025-04-03 PROCEDURE — 6360000002 HC RX W HCPCS: Performed by: ORTHOPAEDIC SURGERY

## 2025-04-03 PROCEDURE — 3700000001 HC ADD 15 MINUTES (ANESTHESIA): Performed by: ORTHOPAEDIC SURGERY

## 2025-04-03 PROCEDURE — G0378 HOSPITAL OBSERVATION PER HR: HCPCS

## 2025-04-03 PROCEDURE — 97530 THERAPEUTIC ACTIVITIES: CPT

## 2025-04-03 PROCEDURE — 7100000001 HC PACU RECOVERY - ADDTL 15 MIN: Performed by: ORTHOPAEDIC SURGERY

## 2025-04-03 PROCEDURE — 2580000003 HC RX 258: Performed by: ANESTHESIOLOGY

## 2025-04-03 PROCEDURE — 3600000005 HC SURGERY LEVEL 5 BASE: Performed by: ORTHOPAEDIC SURGERY

## 2025-04-03 PROCEDURE — 3600000015 HC SURGERY LEVEL 5 ADDTL 15MIN: Performed by: ORTHOPAEDIC SURGERY

## 2025-04-03 PROCEDURE — 6370000000 HC RX 637 (ALT 250 FOR IP): Performed by: ORTHOPAEDIC SURGERY

## 2025-04-03 PROCEDURE — 2500000003 HC RX 250 WO HCPCS: Performed by: NURSE ANESTHETIST, CERTIFIED REGISTERED

## 2025-04-03 PROCEDURE — 82962 GLUCOSE BLOOD TEST: CPT

## 2025-04-03 PROCEDURE — 97161 PT EVAL LOW COMPLEX 20 MIN: CPT

## 2025-04-03 PROCEDURE — APPNB60 APP NON BILLABLE TIME 46-60 MINS: Performed by: NURSE PRACTITIONER

## 2025-04-03 PROCEDURE — C1713 ANCHOR/SCREW BN/BN,TIS/BN: HCPCS | Performed by: ORTHOPAEDIC SURGERY

## 2025-04-03 PROCEDURE — 85610 PROTHROMBIN TIME: CPT

## 2025-04-03 DEVICE — IMPLANTABLE DEVICE
Type: IMPLANTABLE DEVICE | Site: KNEE | Status: FUNCTIONAL
Brand: TRULIANT

## 2025-04-03 DEVICE — COMPONENT TOT KNEE CAPPED K1 STD HEMI CEM: Type: IMPLANTABLE DEVICE | Status: FUNCTIONAL

## 2025-04-03 DEVICE — IMPLANTABLE DEVICE
Type: IMPLANTABLE DEVICE | Site: KNEE | Status: FUNCTIONAL
Brand: OPTETRAK

## 2025-04-03 DEVICE — CRC TIBIAL INSERT
Type: IMPLANTABLE DEVICE | Site: KNEE | Status: FUNCTIONAL
Brand: TRULIANT, ACTIVIT-E

## 2025-04-03 DEVICE — IMPLANTABLE DEVICE
Type: IMPLANTABLE DEVICE | Site: KNEE | Status: FUNCTIONAL
Brand: OPTETRAK LOGIC

## 2025-04-03 DEVICE — PATELLA
Type: IMPLANTABLE DEVICE | Site: KNEE | Status: FUNCTIONAL
Brand: TRULIANT, ACTIVIT-E

## 2025-04-03 DEVICE — CEMENT BNE MED VISC 80 GM GENTAMICIN PALACOS MV+G PRO: Type: IMPLANTABLE DEVICE | Site: KNEE | Status: FUNCTIONAL

## 2025-04-03 RX ORDER — SENNA AND DOCUSATE SODIUM 50; 8.6 MG/1; MG/1
1 TABLET, FILM COATED ORAL 2 TIMES DAILY
Status: DISCONTINUED | OUTPATIENT
Start: 2025-04-03 | End: 2025-04-05 | Stop reason: HOSPADM

## 2025-04-03 RX ORDER — SODIUM CHLORIDE 0.9 % (FLUSH) 0.9 %
5-40 SYRINGE (ML) INJECTION EVERY 12 HOURS SCHEDULED
Status: DISCONTINUED | OUTPATIENT
Start: 2025-04-03 | End: 2025-04-03 | Stop reason: HOSPADM

## 2025-04-03 RX ORDER — POLYETHYLENE GLYCOL 3350 17 G/17G
17 POWDER, FOR SOLUTION ORAL DAILY
Status: DISCONTINUED | OUTPATIENT
Start: 2025-04-03 | End: 2025-04-05 | Stop reason: HOSPADM

## 2025-04-03 RX ORDER — SODIUM CHLORIDE, SODIUM LACTATE, POTASSIUM CHLORIDE, CALCIUM CHLORIDE 600; 310; 30; 20 MG/100ML; MG/100ML; MG/100ML; MG/100ML
INJECTION, SOLUTION INTRAVENOUS CONTINUOUS
Status: DISCONTINUED | OUTPATIENT
Start: 2025-04-03 | End: 2025-04-03 | Stop reason: HOSPADM

## 2025-04-03 RX ORDER — MIDAZOLAM HYDROCHLORIDE 1 MG/ML
INJECTION, SOLUTION INTRAMUSCULAR; INTRAVENOUS
Status: DISCONTINUED | OUTPATIENT
Start: 2025-04-03 | End: 2025-04-03 | Stop reason: SDUPTHER

## 2025-04-03 RX ORDER — ONDANSETRON 4 MG/1
4 TABLET, ORALLY DISINTEGRATING ORAL EVERY 8 HOURS PRN
Status: DISCONTINUED | OUTPATIENT
Start: 2025-04-03 | End: 2025-04-05 | Stop reason: HOSPADM

## 2025-04-03 RX ORDER — WARFARIN SODIUM 2.5 MG/1
TABLET ORAL
Qty: 30 TABLET | Refills: 3 | Status: SHIPPED | OUTPATIENT
Start: 2025-04-03

## 2025-04-03 RX ORDER — FENTANYL CITRATE 50 UG/ML
25 INJECTION, SOLUTION INTRAMUSCULAR; INTRAVENOUS EVERY 5 MIN PRN
Status: DISCONTINUED | OUTPATIENT
Start: 2025-04-03 | End: 2025-04-03 | Stop reason: HOSPADM

## 2025-04-03 RX ORDER — SODIUM CHLORIDE 0.9 % (FLUSH) 0.9 %
5-40 SYRINGE (ML) INJECTION PRN
Status: DISCONTINUED | OUTPATIENT
Start: 2025-04-03 | End: 2025-04-03 | Stop reason: HOSPADM

## 2025-04-03 RX ORDER — SODIUM CHLORIDE, SODIUM LACTATE, POTASSIUM CHLORIDE, CALCIUM CHLORIDE 600; 310; 30; 20 MG/100ML; MG/100ML; MG/100ML; MG/100ML
INJECTION, SOLUTION INTRAVENOUS CONTINUOUS
Status: DISCONTINUED | OUTPATIENT
Start: 2025-04-03 | End: 2025-04-04

## 2025-04-03 RX ORDER — SODIUM CHLORIDE 9 MG/ML
INJECTION, SOLUTION INTRAVENOUS CONTINUOUS
Status: DISCONTINUED | OUTPATIENT
Start: 2025-04-03 | End: 2025-04-05 | Stop reason: HOSPADM

## 2025-04-03 RX ORDER — WARFARIN SODIUM 5 MG/1
7.5 TABLET ORAL
Status: COMPLETED | OUTPATIENT
Start: 2025-04-03 | End: 2025-04-03

## 2025-04-03 RX ORDER — FAMOTIDINE 20 MG/1
20 TABLET, FILM COATED ORAL 2 TIMES DAILY
Status: DISCONTINUED | OUTPATIENT
Start: 2025-04-03 | End: 2025-04-05 | Stop reason: HOSPADM

## 2025-04-03 RX ORDER — ROPIVACAINE HYDROCHLORIDE 5 MG/ML
30 INJECTION, SOLUTION EPIDURAL; INFILTRATION; PERINEURAL ONCE
Status: DISCONTINUED | OUTPATIENT
Start: 2025-04-03 | End: 2025-04-05 | Stop reason: HOSPADM

## 2025-04-03 RX ORDER — OXYCODONE HYDROCHLORIDE 5 MG/1
5 TABLET ORAL
Status: DISCONTINUED | OUTPATIENT
Start: 2025-04-03 | End: 2025-04-03 | Stop reason: HOSPADM

## 2025-04-03 RX ORDER — PROPOFOL 10 MG/ML
INJECTION, EMULSION INTRAVENOUS
Status: DISCONTINUED | OUTPATIENT
Start: 2025-04-03 | End: 2025-04-03 | Stop reason: SDUPTHER

## 2025-04-03 RX ORDER — SODIUM CHLORIDE 9 MG/ML
INJECTION, SOLUTION INTRAVENOUS PRN
Status: DISCONTINUED | OUTPATIENT
Start: 2025-04-03 | End: 2025-04-03 | Stop reason: HOSPADM

## 2025-04-03 RX ORDER — 0.9 % SODIUM CHLORIDE 0.9 %
500 INTRAVENOUS SOLUTION INTRAVENOUS PRN
Status: DISCONTINUED | OUTPATIENT
Start: 2025-04-03 | End: 2025-04-05 | Stop reason: HOSPADM

## 2025-04-03 RX ORDER — ROPIVACAINE HYDROCHLORIDE 5 MG/ML
30 INJECTION, SOLUTION EPIDURAL; INFILTRATION; PERINEURAL ONCE
Status: DISCONTINUED | OUTPATIENT
Start: 2025-04-03 | End: 2025-04-03 | Stop reason: HOSPADM

## 2025-04-03 RX ORDER — NALOXONE HYDROCHLORIDE 0.4 MG/ML
INJECTION, SOLUTION INTRAMUSCULAR; INTRAVENOUS; SUBCUTANEOUS PRN
Status: DISCONTINUED | OUTPATIENT
Start: 2025-04-03 | End: 2025-04-03 | Stop reason: HOSPADM

## 2025-04-03 RX ORDER — ONDANSETRON 2 MG/ML
4 INJECTION INTRAMUSCULAR; INTRAVENOUS EVERY 6 HOURS PRN
Status: DISCONTINUED | OUTPATIENT
Start: 2025-04-03 | End: 2025-04-05 | Stop reason: HOSPADM

## 2025-04-03 RX ORDER — BUPIVACAINE HYDROCHLORIDE 5 MG/ML
INJECTION, SOLUTION EPIDURAL; INTRACAUDAL; PERINEURAL
Status: COMPLETED | OUTPATIENT
Start: 2025-04-03 | End: 2025-04-03

## 2025-04-03 RX ORDER — LIDOCAINE HYDROCHLORIDE 10 MG/ML
1 INJECTION, SOLUTION EPIDURAL; INFILTRATION; INTRACAUDAL; PERINEURAL
Status: DISCONTINUED | OUTPATIENT
Start: 2025-04-03 | End: 2025-04-03 | Stop reason: HOSPADM

## 2025-04-03 RX ORDER — OXYCODONE HYDROCHLORIDE 5 MG/1
5 TABLET ORAL EVERY 6 HOURS PRN
Qty: 40 TABLET | Refills: 0 | Status: SHIPPED | OUTPATIENT
Start: 2025-04-03 | End: 2025-04-13

## 2025-04-03 RX ORDER — FENTANYL CITRATE 50 UG/ML
INJECTION, SOLUTION INTRAMUSCULAR; INTRAVENOUS
Status: DISCONTINUED | OUTPATIENT
Start: 2025-04-03 | End: 2025-04-03 | Stop reason: SDUPTHER

## 2025-04-03 RX ORDER — ROPIVACAINE HYDROCHLORIDE 5 MG/ML
INJECTION, SOLUTION EPIDURAL; INFILTRATION; PERINEURAL
Status: COMPLETED | OUTPATIENT
Start: 2025-04-03 | End: 2025-04-03

## 2025-04-03 RX ORDER — SODIUM CHLORIDE 0.9 % (FLUSH) 0.9 %
5-40 SYRINGE (ML) INJECTION PRN
Status: DISCONTINUED | OUTPATIENT
Start: 2025-04-03 | End: 2025-04-05 | Stop reason: HOSPADM

## 2025-04-03 RX ORDER — HYDROMORPHONE HYDROCHLORIDE 1 MG/ML
0.5 INJECTION, SOLUTION INTRAMUSCULAR; INTRAVENOUS; SUBCUTANEOUS EVERY 5 MIN PRN
Status: DISCONTINUED | OUTPATIENT
Start: 2025-04-03 | End: 2025-04-03 | Stop reason: HOSPADM

## 2025-04-03 RX ORDER — OXYCODONE HYDROCHLORIDE 5 MG/1
10 TABLET ORAL EVERY 4 HOURS PRN
Status: DISCONTINUED | OUTPATIENT
Start: 2025-04-03 | End: 2025-04-05 | Stop reason: HOSPADM

## 2025-04-03 RX ORDER — TRANEXAMIC ACID 100 MG/ML
INJECTION, SOLUTION INTRAVENOUS
Status: DISCONTINUED | OUTPATIENT
Start: 2025-04-03 | End: 2025-04-03 | Stop reason: SDUPTHER

## 2025-04-03 RX ORDER — HYDRALAZINE HYDROCHLORIDE 20 MG/ML
10 INJECTION INTRAMUSCULAR; INTRAVENOUS
Status: DISCONTINUED | OUTPATIENT
Start: 2025-04-03 | End: 2025-04-03 | Stop reason: HOSPADM

## 2025-04-03 RX ORDER — MIDAZOLAM HYDROCHLORIDE 2 MG/2ML
2 INJECTION, SOLUTION INTRAMUSCULAR; INTRAVENOUS PRN
Status: DISCONTINUED | OUTPATIENT
Start: 2025-04-03 | End: 2025-04-03 | Stop reason: HOSPADM

## 2025-04-03 RX ORDER — ACETAMINOPHEN 500 MG
1000 TABLET ORAL ONCE
Status: DISCONTINUED | OUTPATIENT
Start: 2025-04-03 | End: 2025-04-03 | Stop reason: HOSPADM

## 2025-04-03 RX ORDER — DEXAMETHASONE SODIUM PHOSPHATE 4 MG/ML
INJECTION, SOLUTION INTRA-ARTICULAR; INTRALESIONAL; INTRAMUSCULAR; INTRAVENOUS; SOFT TISSUE
Status: DISCONTINUED | OUTPATIENT
Start: 2025-04-03 | End: 2025-04-03 | Stop reason: SDUPTHER

## 2025-04-03 RX ORDER — FENTANYL CITRATE 50 UG/ML
100 INJECTION, SOLUTION INTRAMUSCULAR; INTRAVENOUS
Refills: 0 | Status: COMPLETED | OUTPATIENT
Start: 2025-04-03 | End: 2025-04-03

## 2025-04-03 RX ORDER — ONDANSETRON 2 MG/ML
4 INJECTION INTRAMUSCULAR; INTRAVENOUS
Status: DISCONTINUED | OUTPATIENT
Start: 2025-04-03 | End: 2025-04-03 | Stop reason: HOSPADM

## 2025-04-03 RX ORDER — SODIUM CHLORIDE 0.9 % (FLUSH) 0.9 %
5-40 SYRINGE (ML) INJECTION EVERY 12 HOURS SCHEDULED
Status: DISCONTINUED | OUTPATIENT
Start: 2025-04-03 | End: 2025-04-05 | Stop reason: HOSPADM

## 2025-04-03 RX ORDER — LIDOCAINE HYDROCHLORIDE 20 MG/ML
INJECTION, SOLUTION EPIDURAL; INFILTRATION; INTRACAUDAL; PERINEURAL
Status: DISCONTINUED | OUTPATIENT
Start: 2025-04-03 | End: 2025-04-03 | Stop reason: SDUPTHER

## 2025-04-03 RX ORDER — PROCHLORPERAZINE EDISYLATE 5 MG/ML
5 INJECTION INTRAMUSCULAR; INTRAVENOUS
Status: DISCONTINUED | OUTPATIENT
Start: 2025-04-03 | End: 2025-04-03 | Stop reason: HOSPADM

## 2025-04-03 RX ORDER — SODIUM CHLORIDE 9 MG/ML
INJECTION, SOLUTION INTRAVENOUS PRN
Status: DISCONTINUED | OUTPATIENT
Start: 2025-04-03 | End: 2025-04-05 | Stop reason: HOSPADM

## 2025-04-03 RX ORDER — OXYCODONE HYDROCHLORIDE 5 MG/1
5 TABLET ORAL EVERY 4 HOURS PRN
Status: DISCONTINUED | OUTPATIENT
Start: 2025-04-03 | End: 2025-04-05 | Stop reason: HOSPADM

## 2025-04-03 RX ORDER — ACETAMINOPHEN 325 MG/1
650 TABLET ORAL EVERY 6 HOURS
Status: DISCONTINUED | OUTPATIENT
Start: 2025-04-03 | End: 2025-04-05 | Stop reason: HOSPADM

## 2025-04-03 RX ORDER — GINSENG 100 MG
CAPSULE ORAL PRN
Status: DISCONTINUED | OUTPATIENT
Start: 2025-04-03 | End: 2025-04-03 | Stop reason: HOSPADM

## 2025-04-03 RX ORDER — BISACODYL 10 MG
10 SUPPOSITORY, RECTAL RECTAL DAILY PRN
Status: DISCONTINUED | OUTPATIENT
Start: 2025-04-03 | End: 2025-04-05 | Stop reason: HOSPADM

## 2025-04-03 RX ORDER — HYDROXYZINE HYDROCHLORIDE 10 MG/1
10 TABLET, FILM COATED ORAL EVERY 8 HOURS PRN
Status: DISCONTINUED | OUTPATIENT
Start: 2025-04-03 | End: 2025-04-05 | Stop reason: HOSPADM

## 2025-04-03 RX ORDER — TRANEXAMIC ACID 100 MG/ML
INJECTION, SOLUTION INTRAVENOUS PRN
Status: DISCONTINUED | OUTPATIENT
Start: 2025-04-03 | End: 2025-04-03 | Stop reason: HOSPADM

## 2025-04-03 RX ORDER — ONDANSETRON 2 MG/ML
INJECTION INTRAMUSCULAR; INTRAVENOUS
Status: DISCONTINUED | OUTPATIENT
Start: 2025-04-03 | End: 2025-04-03 | Stop reason: SDUPTHER

## 2025-04-03 RX ORDER — ACETAMINOPHEN 500 MG
1000 TABLET ORAL ONCE
Status: COMPLETED | OUTPATIENT
Start: 2025-04-03 | End: 2025-04-03

## 2025-04-03 RX ADMIN — WARFARIN SODIUM 7.5 MG: 5 TABLET ORAL at 13:55

## 2025-04-03 RX ADMIN — FENTANYL CITRATE 50 MCG: 50 INJECTION INTRAMUSCULAR; INTRAVENOUS at 07:37

## 2025-04-03 RX ADMIN — PROPOFOL 25 MG: 10 INJECTION, EMULSION INTRAVENOUS at 07:37

## 2025-04-03 RX ADMIN — WATER 2000 MG: 1 INJECTION INTRAMUSCULAR; INTRAVENOUS; SUBCUTANEOUS at 15:50

## 2025-04-03 RX ADMIN — OXYCODONE 10 MG: 5 TABLET ORAL at 19:50

## 2025-04-03 RX ADMIN — BUPIVACAINE HYDROCHLORIDE 12.5 MG: 5 INJECTION, SOLUTION EPIDURAL; INTRACAUDAL; PERINEURAL at 07:42

## 2025-04-03 RX ADMIN — LIDOCAINE HYDROCHLORIDE 40 MG: 20 INJECTION, SOLUTION EPIDURAL; INFILTRATION; INTRACAUDAL; PERINEURAL at 07:37

## 2025-04-03 RX ADMIN — FENTANYL CITRATE 25 MCG: 50 INJECTION INTRAMUSCULAR; INTRAVENOUS at 07:49

## 2025-04-03 RX ADMIN — HYDROMORPHONE HYDROCHLORIDE 0.5 MG: 1 INJECTION, SOLUTION INTRAMUSCULAR; INTRAVENOUS; SUBCUTANEOUS at 17:46

## 2025-04-03 RX ADMIN — POLYETHYLENE GLYCOL 3350 17 G: 17 POWDER, FOR SOLUTION ORAL at 13:55

## 2025-04-03 RX ADMIN — SENNOSIDES AND DOCUSATE SODIUM 1 TABLET: 50; 8.6 TABLET ORAL at 22:00

## 2025-04-03 RX ADMIN — FAMOTIDINE 20 MG: 20 TABLET, FILM COATED ORAL at 22:00

## 2025-04-03 RX ADMIN — ACETAMINOPHEN 1000 MG: 500 TABLET ORAL at 06:49

## 2025-04-03 RX ADMIN — FENTANYL CITRATE 50 MCG: 0.05 INJECTION, SOLUTION INTRAMUSCULAR; INTRAVENOUS at 07:13

## 2025-04-03 RX ADMIN — MIDAZOLAM 1 MG: 1 INJECTION INTRAMUSCULAR; INTRAVENOUS at 07:27

## 2025-04-03 RX ADMIN — SODIUM CHLORIDE, PRESERVATIVE FREE 10 ML: 5 INJECTION INTRAVENOUS at 22:00

## 2025-04-03 RX ADMIN — SODIUM CHLORIDE: 0.9 INJECTION, SOLUTION INTRAVENOUS at 16:01

## 2025-04-03 RX ADMIN — MIDAZOLAM HYDROCHLORIDE 2 MG: 1 INJECTION, SOLUTION INTRAMUSCULAR; INTRAVENOUS at 07:13

## 2025-04-03 RX ADMIN — MIDAZOLAM 1 MG: 1 INJECTION INTRAMUSCULAR; INTRAVENOUS at 07:34

## 2025-04-03 RX ADMIN — PROPOFOL 50 MCG/KG/MIN: 10 INJECTION, EMULSION INTRAVENOUS at 07:38

## 2025-04-03 RX ADMIN — DEXAMETHASONE SODIUM PHOSPHATE 8 MG: 4 INJECTION INTRA-ARTICULAR; INTRALESIONAL; INTRAMUSCULAR; INTRAVENOUS; SOFT TISSUE at 07:55

## 2025-04-03 RX ADMIN — ROPIVACAINE HYDROCHLORIDE 20 ML: 5 INJECTION EPIDURAL; INFILTRATION; PERINEURAL at 07:15

## 2025-04-03 RX ADMIN — ONDANSETRON 4 MG: 2 INJECTION, SOLUTION INTRAMUSCULAR; INTRAVENOUS at 10:38

## 2025-04-03 RX ADMIN — PROPOFOL 25 MG: 10 INJECTION, EMULSION INTRAVENOUS at 09:18

## 2025-04-03 RX ADMIN — SODIUM CHLORIDE, POTASSIUM CHLORIDE, SODIUM LACTATE AND CALCIUM CHLORIDE: 600; 310; 30; 20 INJECTION, SOLUTION INTRAVENOUS at 08:24

## 2025-04-03 RX ADMIN — SENNOSIDES AND DOCUSATE SODIUM 1 TABLET: 50; 8.6 TABLET ORAL at 13:55

## 2025-04-03 RX ADMIN — WATER 2000 MG: 1 INJECTION INTRAMUSCULAR; INTRAVENOUS; SUBCUTANEOUS at 07:49

## 2025-04-03 RX ADMIN — OXYCODONE 5 MG: 5 TABLET ORAL at 15:47

## 2025-04-03 RX ADMIN — ACETAMINOPHEN 650 MG: 325 TABLET ORAL at 13:56

## 2025-04-03 RX ADMIN — FAMOTIDINE 20 MG: 20 TABLET, FILM COATED ORAL at 13:57

## 2025-04-03 RX ADMIN — ACETAMINOPHEN 650 MG: 325 TABLET ORAL at 19:50

## 2025-04-03 RX ADMIN — TRANEXAMIC ACID 1000 MG: 100 INJECTION, SOLUTION INTRAVENOUS at 08:04

## 2025-04-03 RX ADMIN — SODIUM CHLORIDE, POTASSIUM CHLORIDE, SODIUM LACTATE AND CALCIUM CHLORIDE: 600; 310; 30; 20 INJECTION, SOLUTION INTRAVENOUS at 06:49

## 2025-04-03 RX ADMIN — FENTANYL CITRATE 25 MCG: 50 INJECTION INTRAMUSCULAR; INTRAVENOUS at 08:01

## 2025-04-03 RX ADMIN — ONDANSETRON 4 MG: 2 INJECTION, SOLUTION INTRAMUSCULAR; INTRAVENOUS at 07:27

## 2025-04-03 RX ADMIN — SODIUM CHLORIDE: 0.9 INJECTION, SOLUTION INTRAVENOUS at 11:08

## 2025-04-03 ASSESSMENT — PAIN DESCRIPTION - DESCRIPTORS
DESCRIPTORS: ACHING
DESCRIPTORS: ACHING
DESCRIPTORS: STABBING;SHARP
DESCRIPTORS: ACHING

## 2025-04-03 ASSESSMENT — PAIN SCALES - GENERAL
PAINLEVEL_OUTOF10: 5
PAINLEVEL_OUTOF10: 8
PAINLEVEL_OUTOF10: 7

## 2025-04-03 ASSESSMENT — PAIN DESCRIPTION - LOCATION
LOCATION: LEG

## 2025-04-03 ASSESSMENT — PAIN - FUNCTIONAL ASSESSMENT: PAIN_FUNCTIONAL_ASSESSMENT: 0-10

## 2025-04-03 ASSESSMENT — PAIN DESCRIPTION - ORIENTATION
ORIENTATION: RIGHT

## 2025-04-03 NOTE — PERIOP NOTE
Irrisept antimicrobial irrigation used during the procedure  Ref # [id number]  Lot # [id number]  Exp. Date 11/30/2027

## 2025-04-03 NOTE — BRIEF OP NOTE
Brief Postoperative Note      Patient: Fallon Dowd  YOB: 1960  MRN: [id number]    Date of Procedure: 4/3/2025    Pre-Op Diagnosis Codes:      * Osteoarthritis of right knee [M17.11]    Post-Op Diagnosis: Same       Procedure(s):  RIGHT TOTAL KNEE ARTHROPLASTY    Surgeon(s):  Jose Trinh MD Wolfe, Shannon, MD    Assistant:  Physician Assistant: Gilda Patrick PA-C    Anesthesia: Spinal with mac    Estimated Blood Loss (mL): 200     Complications: None    Specimens:   Bone discarded    Implants:  Implant Name Type Inv. Item Serial No.  Lot No. LRB No. Used Action   CEMENT BNE MED VISC 80 GM GENTAMICIN PALACOS MV+G PRO - SNA  CEMENT BNE MED VISC 80 GM GENTAMICIN PALACOS MV+G PRO NA [employer]WD [id number] Right 1 Implanted   COMPONENT PATELLAR ACTIVIT-E YOHAN STRL TRULIANT - [id number]  COMPONENT PATELLAR ACTIVIT-E YOHAN STRL TRULIANT [id number] [employer]WD N/A Right 1 Implanted   COMPONENT TIB TY YOHAN 3F/2.5T KNEE TRULIANT - [id number]  COMPONENT TIB TY YOHAN 3F/2.5T KNEE TRULIANT [id number] [employer]WD N/A Right 1 Implanted   SCREW ATTCH TIB STEM EXACTECH - [id number]  SCREW ATTCH TIB STEM EXACTECH [id number] [employer]WD N/A Right 1 Implanted   STEM FEM L40MM [id number] DSTL EXTN - [id number]  STEM FEM L40MM [id number] DSTL EXTN [id number] [employer] N/A Right 1 Implanted   COMPONENT FEM SZ 3 RT CRUC RET YOHAN TRULIANT - [id number]  COMPONENT FEM SZ 3 RT CRUC RET YOHAN TRULIANT [id number] [employer] NA Right 1 Implanted   INSERT TIB THK 13 MM SZ 3 ACTIVIT-E KNEE CR CONSTRND STRL - [id number]  INSERT TIB THK 13 MM SZ 3 ACTIVIT-E KNEE CR CONSTRND STRL [id number] [employer] NA Right 1 Implanted             Electronically signed by Jadyn Villavicencio MD on 4/3/2025 at 10:35 AM

## 2025-04-03 NOTE — PERIOP NOTE
0718: RT leg adductor canal nerve block done by Dr Ortiz using 20cc of 0.5% Ropivicaine with US guidance, with pt monitored, O2 @ 2l/m/nc and IV sedation given. Pt tolerated procedure well. VSS post block: 119/58-63-10, SpO2=97% on 2l/m/nc. NSR. Groggy but arouses easily to verbal stimuli. See anesthesia note.

## 2025-04-03 NOTE — PLAN OF CARE
Problem: Physical Therapy - Adult  Goal: By Discharge: Performs mobility at highest level of function for planned discharge setting.  See evaluation for individualized goals.  Description: FUNCTIONAL STATUS PRIOR TO ADMISSION: Patient was modified independent using a single point cane for functional mobility - as needed for pain and stability.    HOME SUPPORT PRIOR TO ADMISSION: The patient lived with spouse and did not require assistance.    Physical Therapy Goals  Initiated 4/3/2025  1.  Patient will move from supine to sit and sit to supine and scoot up and down in bed with modified independence within 4 day(s).    2.  Patient will perform sit to stand with modified independence within 4 day(s).  3.  Patient will transfer from bed to chair and chair to bed with modified independence using the least restrictive device within 4 day(s).  4.  Patient will ambulate with modified independence for > 150 feet with the least restrictive device within 4 day(s).   5.  Patient will ascend/descend 4 stairs with one handrail(s) with supervision within 4 day(s).  6. Patient will perform TKR home exercise program per protocol with supervision/set-up within 4 days.  7. Patient will demonstrate AROM 0-90 degrees in operative joint within 4 days.     PHYSICAL THERAPY EVALUATION    Patient: Fallon Dowd (65 y.o. female)  Date: 4/3/2025  Primary Diagnosis: Osteoarthritis of right knee [M17.11]  Primary osteoarthritis of right knee [M17.11]  Procedure(s) (LRB):  RIGHT TOTAL KNEE ARTHROPLASTY (Right) * Day of Surgery *   Precautions: Restrictions/Precautions  Restrictions/Precautions: Weight Bearing, Fall Risk  Activity Level: Up with Assist Lower Extremity Weight Bearing Restrictions  Right Lower Extremity Weight Bearing: Weight Bearing As Tolerated          ASSESSMENT :   DEFICITS/IMPAIRMENTS:   The patient presents POD # 0 right TKR with pain right knee, decreased AROM/strength and function right leg, decreased activity                                                                             Intervention/Education specific to: \"knee replacement\"    Education provided to avoid resting in external rotation or knee flexion while in bed. Discussed avoidance of resting with a pillow under the knee but that a pillow from calf to heel is acceptable for short periods if it supports knee extension. Encouraged use of cryo therapy to aide in pain and edema control.              Therapeutic Exercises:   Pt instructed and performed ankle pumps and demonstrated proper use of incentive spirometer - to be performed x 10 reps once hr when awake.  Pt instructed and performed quad sets, hamstring sets and heel slides - to be performed 3 - 5 reps once hr when awake as tolerated.  Written instructions provided on pt's communication board.     [facility] AM-PAC®      Basic Mobility Inpatient Short Form (6-Clicks) Version 2  How much HELP from another person do you currently need... (If the patient hasn't done an activity recently, how much help from another person do you think they would need if they tried?) Total A Lot A Little None   1.  Turning from your back to your side while in a flat bed without using bedrails? []  1 []  2 []  3  [x]  4   2.  Moving from lying on your back to sitting on the side of a flat bed without using bedrails? []  1 []  2 []  3  [x]  4   3.  Moving to and from a bed to a chair (including a wheelchair)? []  1 []  2 [x]  3  []  4   4. Standing up from a chair using your arms (e.g. wheelchair or bedside chair)? []  1 []  2 []  3  [x]  4   5.  Walking in hospital room? []  1 []  2 [x]  3  []  4   6.  Climbing 3-5 steps with a railing? []  1 []  2 [x]  3  []  4     Raw Score: 21/24                            Cutoff score <=171,2,3 had higher odds of discharging home with home health or need of SNF/IPR.    1. Clarisse Carr, Eugenie Stallings, Gerardo Rsut, Roslyn Husain, Krish Ann, Usama Carr.

## 2025-04-03 NOTE — ANESTHESIA PROCEDURE NOTES
Spinal Block    Patient location during procedure: OR  Reason for block: primary anesthetic and at surgeon's request  Staffing  Performed by: Jessica Ortiz DO  Authorized by: Jessica Ortiz DO    Spinal Block  Patient position: sitting  Prep: ChloraPrep  Patient monitoring: continuous pulse ox, frequent blood pressure checks and oxygen  Approach: midline  Location: L3/L4  Provider prep: mask and sterile gloves  Needle  Needle gauge: 24 G  Assessment  CSF: clear  Attempts: 1  Hemodynamics: stable  Preanesthetic Checklist  Completed: patient identified, IV checked, site marked, risks and benefits discussed, surgical/procedural consents, equipment checked, pre-op evaluation, timeout performed, anesthesia consent given, oxygen available, monitors applied/VS acknowledged, fire risk safety assessment completed and verbalized and blood product R/B/A discussed and consented

## 2025-04-03 NOTE — PERIOP NOTE
TRANSFER - OUT REPORT:    Verbal report given to Wili PAK on Fallon Dowd  being transferred to room 578 for routine post-op       Report consisted of patient’s Situation, Background, Assessment and   Recommendations(SBAR).     Time Pre op antibiotic given:0749  Anesthesia Stop time: 1050    Information from the following report(s) SBAR, OR Summary, Intake/Output, and MAR was reviewed with the receiving nurse.    Opportunity for questions and clarification was provided.     Is the patient on 02? Yes       L/Min 2       Other     Ramirez Present on Transfer to floor: No  Order for Ramirez on Chart: n/a    Is the patient on a monitor? No    Is the nurse transporting with the patient? No    Surgical Waiting Area notified of patient's transfer from PACU? Yes    Lines:   Peripheral IV 04/03/25 Posterior;Right Hand (Active)   Site Assessment Clean, dry & intact 04/03/25 1050   Line Status Infusing 04/03/25 1050   Phlebitis Assessment No symptoms 04/03/25 1050   Infiltration Assessment 0 04/03/25 1050   Dressing Status Clean, dry & intact 04/03/25 1050   Dressing Type Transparent 04/03/25 1050        The following personal items collected during your admission accompanied patient upon transfer:   Dental Appliance:    Vision:    Hearing Aid:    Jewelry:    Clothing:    Other Valuables:    Valuables sent to safe:     VS stable. Resting comfortably. Patient denies nausea. Pain improved. No signs of excessive bleeding. Ready to transfer from PACU.

## 2025-04-03 NOTE — OP NOTE
[address]  [address]                            OPERATIVE REPORT      PATIENT NAME: ZORA GALINDO            : 1960  MED REC NO: [id number]                       ROOM: 578  ACCOUNT NO: [id number]                       ADMIT DATE: 2025  PROVIDER: Jadyn Villavicencio MD    DATE OF SERVICE:  2025    PREOPERATIVE DIAGNOSES:  End-stage osteoarthritis of the right knee.    POSTOPERATIVE DIAGNOSES:  End-stage osteoarthritis of the right knee.    PROCEDURES PERFORMED:  Imageless navigated total knee replacement of the right.    SURGEON:  Jadyn Villavicencio MD    ASSISTANT:  GERBER Joyce.    ANESTHESIA:  Spinal with a regional block and local followed by 266 mg of Exparel.    ESTIMATED BLOOD LOSS:  Less than 200 mL.    SPECIMENS REMOVED:   bone discarded.    INTRAOPERATIVE FINDINGS:  ***     COMPLICATIONS:  Negative.    IMPLANTS:  Exactech Truliant size 3 femur, size 2.5 tibia with a size 13 CRC poly and a 32 patella.  In addition, there was a 14 x 40 extension placed on the tibia secondary to the patient's body habitus.    INDICATIONS:  The patient is a 65-year-old, whom I have seen in the office multiple times.  She has had end-stage osteoarthritis of both knees for quite some time.  Her right had always been more painful than her left.  She also had a flexion contracture.  Failing conservative treatment, she returned to my office and wished to proceed with a knee replacement.  I had a long conversation in regard to the surgery.  We specifically talked about the longevity of the implant.  I went over the surgical technique in great depth in detail.  We discussed the risks of surgery such as but not limited to postoperative pain, numbness and tingling, loss of range of motion, fracture dislocation, which may require revision surgery, infection, DVT, PE, MI, CVA, and other unforeseen events that could occur in a perioperative  knee was ranged and the size 13 was appropriate.  We dropped our tourniquet and the knee was hyperflexed.  We removed the trial tibia.  It was then irrigated and the final tibial poly was inserted.  The knee was reduced and brought out to extension.  Any bleeding was controlled with electrocautery.  We   irrigated with Irrisept and pulse irrigation.  A 1 g of topical TXA was placed.  A #2 Vicryl was used to close the proximal portion of the quad and Stratafix was used to close the distal portion.  Due to the girth of the subcutaneous tissue, 0 Vicryl was placed in the adipose tissue and the Rupal layer.  A 2-0 Vicryl was placed in the subcutaneous and the dermal layer and a running 4-0 Monocryl was placed in the epidermis.  Steri-Strips, Adaptic, and bacitracin ointment were applied over the incision.  A sterile dry dressing was placed over the knee.  The patient was awakened from sedation, transferred to recovery room in stable condition.    Gilda Patrick was integral during the surgical procedure.  She helped with positioning the patient.  She helped to hold the retractor for exposure of the knee.  She also helped with closure of the wound, application of the dressing, and transportation to recovery room.    SECOND ASSISTANT:  Jose Trinh MD    CRYSTALLOIDS:  Given.        MD NUVIA IRBY/AQS  D:  04/03/2025 10:35:04  T:  04/03/2025 15:02:45  JOB #:  [id number]

## 2025-04-03 NOTE — ANESTHESIA PROCEDURE NOTES
Peripheral Block    Patient location during procedure: pre-op  Reason for block: post-op pain management and at surgeon's request  Start time: 4/3/2025 7:15 AM  Staffing  Performed: anesthesiologist   Performed by: Jessica Ortiz DO  Authorized by: Jessica Ortiz DO    Preanesthetic Checklist  Completed: patient identified, IV checked, site marked, risks and benefits discussed, surgical/procedural consents, equipment checked, pre-op evaluation, timeout performed, anesthesia consent given, oxygen available and monitors applied/VS acknowledged  Peripheral Block   Patient position: supine  Prep: ChloraPrep  Provider prep: sterile gloves  Patient monitoring: cardiac monitor, continuous pulse ox, frequent blood pressure checks, IV access, oxygen and responsive to questions  Block type: Femoral  Adductor canal  Laterality: right  Injection technique: single-shot  Guidance: ultrasound guided    Needle   Needle type: insulated echogenic nerve stimulator needle   Needle gauge: 22 G  Needle localization: ultrasound guidance  Assessment   Injection assessment: negative aspiration for heme, no paresthesia on injection, local visualized surrounding nerve on ultrasound and no intravascular symptoms  Paresthesia pain: none  Slow fractionated injection: yes  Hemodynamics: stable  Outcomes: uncomplicated and patient tolerated procedure well    Medications Administered  ropivacaine (NAROPIN) injection 0.5% - Perineural   20 mL - 4/3/2025 7:15:00 AM

## 2025-04-03 NOTE — PROGRESS NOTES
Patient assessed for readiness to ambulate.   Patient ambulated with assistance of 1 nurses. Patient ambulated with gait belt and walker. Patient walked to bedside commode standard walker . Patient returned safely to bed.     Vitals:    04/03/25 1430   BP: (!) 155/78   Pulse: 69   Resp: 16   Temp: 97.9 °F (36.6 °C)   SpO2: 98%

## 2025-04-03 NOTE — PROGRESS NOTES
Pharmacist Note - Warfarin Dosing  Consult provided for this 65 y.o. female to manage warfarin for VTE ppx following RIGHT TOTAL KNEE ARTHROPLASTY     INR Goal: 1.7- 2.2    Therapy Day: 1    Preop Dose: Villavicencio- none    Drugs that may increase INR: None  Drugs that may decrease INR: None  Other current anticoagulants/ drugs that may increase bleeding risk: None  Risk factors: Age > 65  Daily INR ordered: YES    No results for input(s): \"HGB\", \"INR\" in the last 72 hours.    Date               INR                 Dose  4/3  pending 7.5 mg    Assessment/ Plan:  Will order warfarin 7.5 mg PO x 1 dose. Recent data supports using a higher initial dose of warfarin in patients with BMI >40.     Pharmacy will continue to monitor daily and adjust therapy as indicated.

## 2025-04-03 NOTE — H&P
KENDAL PRE-OP HISTORY AND PHYSICAL    Subjective:     Patient is a 65 y.o. female presented with a history of right knee pain.  Onset of symptoms was gradual with gradually worsening course since that time. She is being admitted for surgical management of this condition.     Patient Active Problem List    Diagnosis Date Noted    Osteoarthritis of right knee 01/30/2025    Prediabetes 01/24/2022    Current moderate episode of major depressive disorder without prior episode (HCC) 05/14/2021    Bilateral carpal tunnel syndrome 12/16/2019    Primary osteoarthritis of both knees 09/11/2017    Morbid obesity with BMI of 45.0-49.9, adult 07/13/2017    Generalized anxiety disorder 05/03/2016    Hypercholesterolemia 03/04/2016    Seasonal allergic rhinitis 08/12/2015    Hypertension, essential, benign 05/20/2013     Past Medical History:   Diagnosis Date    Allergic rhinitis     Seasonal    Anxiety     Work related    Arthritis     Knees    Asthma     Environmental allergies     Hives     HTN (hypertension) 5/20/2013    Hypercholesterolemia     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 Larsen. Normal mucosa. Gr 2 int hemorrhoids. Repeat in 10 yrs.    COLONOSCOPY N/A 01/23/2025    COLONOSCOPY performed by Jaylin Larsen DO at [facility] ENDOSCOPY    EYE SURGERY Bilateral 2013    lasik    HEEL SPUR SURGERY Left 05/16/2024    LEFT FOOT REPAIR ACHILLES TENDON AND REMOVE HEEL SPUR performed by Sandra Lindsay DPM at [facility] MAIN OR    OTHER SURGICAL HISTORY Right 2014    Achilles tendon repair      Prior to Admission medications    Medication Sig Start Date End Date Taking? Authorizing Provider   naltrexone-buPROPion (CONTRAVE) 8-90 MG per extended            Assessment:     Principal Problem:    Osteoarthritis of right knee  Resolved Problems:    * No resolved hospital problems. *      Plan:     The various methods of treatment have been discussed with the patient and family.   After consideration of risks, benefits and other options for treatment, the patient has consented to surgical interventions right total knee arthroplasty.

## 2025-04-03 NOTE — ANESTHESIA PRE PROCEDURE
Department of Anesthesiology  Preprocedure Note       Name:  Fallon Dowd   Age:  65 y.o.  :  1960                                          MRN:  [id number]         Date:  4/3/2025      Surgeon: Surgeon(s):  Jadyn Villavicencio MD    Procedure: Procedure(s):  RIGHT TOTAL KNEE ARTHROPLASTY    Medications prior to admission:   Prior to Admission medications    Medication Sig Start Date End Date Taking? Authorizing Provider   naltrexone-buPROPion (CONTRAVE) 8-90 MG per extended release tablet Take 1 tablet by mouth once daily for 2 weeks, then take 1 tablet twice a day. 3/11/25  Yes Awa Betancourt MD   hydrALAZINE (APRESOLINE) 25 MG tablet Take 1 tablet by mouth 3 times daily 25  Yes Awa Betancourt MD   traZODone (DESYREL) 50 MG tablet Take 2 tablets by mouth nightly 24  Yes Awa Betancourt MD   pravastatin (PRAVACHOL) 20 MG tablet Take 1 tablet by mouth nightly 24  Yes Awa Betancourt MD   lisinopril-hydroCHLOROthiazide (PRINZIDE;ZESTORETIC) 20-12.5 MG per tablet Take 2 tablets by mouth daily 24  Yes Awa Betancourt MD   DULoxetine (CYMBALTA) 60 MG extended release capsule Take 1 capsule by mouth 2 times daily 24  Yes Awa Betancourt MD   Vitamin D, Ergocalciferol, 50 MCG (2000 UT) CAPS Take 1 capsule by mouth daily 24  Yes Awa Betancourt MD   Cetirizine HCl (ZYRTEC ALLERGY) 10 MG CAPS Take by mouth as needed   Yes Automatic Reconciliation, Ar   naproxen sodium (ANAPROX) 220 MG tablet Take 1 tablet by mouth 2 times daily (with meals)   Yes Automatic Reconciliation, Ar   albuterol sulfate HFA (PROVENTIL;VENTOLIN;PROAIR) 108 (90 Base) MCG/ACT inhaler INHALE 2 PUFFS BY MOUTH EVERY 6 HOURS AS NEEDED FOR WHEEZING  Patient not taking: Reported on 4/3/2025 3/7/22   Automatic Reconciliation, Ar       Current medications:    Current Facility-Administered Medications   Medication Dose Route Frequency Provider Last Rate Last Admin   • sodium chloride flush 0.9 % injection 5-40 mL  5-40 mL

## 2025-04-03 NOTE — PROGRESS NOTES
Ortho NP Note    POD# 0  s/p RIGHT TOTAL KNEE ARTHROPLASTY   Pt seen with family at bedside.     Pt resting in bed comfortably.   Reports minimal postop knee pain, made aware to ask for prn oxycodone if/when pain increases   Tolerating regular diet. No nausea   Postop plan reviewed - No complaints/concerns.    VSS Afebrile.    Visit Vitals  /77   Pulse 69   Temp 98.1 °F (36.7 °C) (Oral)   Resp 16   Ht 1.6 m (5' 3\")   Wt 116.7 kg (257 lb 4.4 oz)   SpO2 97%   BMI 45.57 kg/m²       Voiding status: due to void          Labs    Lab Results   Component Value Date/Time    HGB 10.9 03/20/2025 02:13 PM      Lab Results   Component Value Date/Time    INR 1.0 03/20/2025 02:13 PM      Lab Results   Component Value Date/Time     03/20/2025 02:13 PM    K 4.0 03/20/2025 02:13 PM     03/20/2025 02:13 PM    CO2 31 03/20/2025 02:13 PM    BUN 20 03/20/2025 02:13 PM     Recent Glucose Results:   Glucose   Date Value Ref Range Status   03/20/2025 100 65 - 100 mg/dL Final   08/05/2024 99 65 - 100 mg/dL Final   04/29/2024 95 65 - 100 mg/dL Final           Body mass index is 45.57 kg/m². : A BMI > 30 is classified as obesity and > 40 is classified as morbid obesity.       Ace wrap dressing c.d.i  Cryotherapy in place over incision  Calves soft and supple; No pain with passive stretch  Sensation and motor intact. +PF/DF/EHL intact   SCDs for mechanical DVT proph while in bed     PLAN:  1) PT BID, OT - WBAT  2) Coumadin for DVT Prophylaxis - Pharmacy to dose. Encouraged early mobilization, bed exercises, and SCD use.  3) Pain control - scheduled tylenol and prn  oxycodone .  4) Post op care - Continue bowel regimen, encouraged IS. Straight cath per protocol. Ace wrap to be removed on POD1. Daily dry dressing changes.    5) Readniess for discharge:     [x] Vital Signs stable    [] Hgb stable    [] + Voiding    [x] Wound intact, drainage minimal    [x] Tolerating PO intake     [] Cleared by PT (OT if applicable)     [] Stair  training completed (if applicable)    [] Independent / Contact Guard Assist (household distance)     [] Bed mobility     [] Car transfers     [] ADL’s    [x] Adequate pain control on oral medication alone     Routine postop care.   Plans to return home with HH & family's support.     ZEYNEP Campbell - NP

## 2025-04-04 LAB
INR PPP: 1.1 (ref 0.9–1.1)
PROTHROMBIN TIME: 11.6 SEC (ref 9.2–11.2)

## 2025-04-04 PROCEDURE — 97535 SELF CARE MNGMENT TRAINING: CPT

## 2025-04-04 PROCEDURE — 97116 GAIT TRAINING THERAPY: CPT

## 2025-04-04 PROCEDURE — 2500000003 HC RX 250 WO HCPCS

## 2025-04-04 PROCEDURE — 6360000002 HC RX W HCPCS

## 2025-04-04 PROCEDURE — G0378 HOSPITAL OBSERVATION PER HR: HCPCS

## 2025-04-04 PROCEDURE — 6370000000 HC RX 637 (ALT 250 FOR IP)

## 2025-04-04 PROCEDURE — 96374 THER/PROPH/DIAG INJ IV PUSH: CPT

## 2025-04-04 PROCEDURE — 85610 PROTHROMBIN TIME: CPT

## 2025-04-04 PROCEDURE — 96376 TX/PRO/DX INJ SAME DRUG ADON: CPT

## 2025-04-04 PROCEDURE — 6370000000 HC RX 637 (ALT 250 FOR IP): Performed by: NURSE PRACTITIONER

## 2025-04-04 PROCEDURE — 97165 OT EVAL LOW COMPLEX 30 MIN: CPT

## 2025-04-04 RX ORDER — LISINOPRIL 20 MG/1
40 TABLET ORAL DAILY
Status: DISCONTINUED | OUTPATIENT
Start: 2025-04-04 | End: 2025-04-05 | Stop reason: HOSPADM

## 2025-04-04 RX ORDER — PRAVASTATIN SODIUM 10 MG
20 TABLET ORAL NIGHTLY
Status: DISCONTINUED | OUTPATIENT
Start: 2025-04-04 | End: 2025-04-05 | Stop reason: HOSPADM

## 2025-04-04 RX ORDER — TRAZODONE HYDROCHLORIDE 100 MG/1
100 TABLET ORAL NIGHTLY
Status: DISCONTINUED | OUTPATIENT
Start: 2025-04-04 | End: 2025-04-05 | Stop reason: HOSPADM

## 2025-04-04 RX ORDER — LISINOPRIL AND HYDROCHLOROTHIAZIDE 12.5; 2 MG/1; MG/1
2 TABLET ORAL DAILY
Status: DISCONTINUED | OUTPATIENT
Start: 2025-04-04 | End: 2025-04-04

## 2025-04-04 RX ORDER — WARFARIN SODIUM 5 MG/1
7.5 TABLET ORAL
Status: COMPLETED | OUTPATIENT
Start: 2025-04-04 | End: 2025-04-04

## 2025-04-04 RX ORDER — HYDROCHLOROTHIAZIDE 25 MG/1
50 TABLET ORAL DAILY
Status: DISCONTINUED | OUTPATIENT
Start: 2025-04-04 | End: 2025-04-05 | Stop reason: HOSPADM

## 2025-04-04 RX ORDER — DULOXETIN HYDROCHLORIDE 60 MG/1
60 CAPSULE, DELAYED RELEASE ORAL 2 TIMES DAILY
Status: DISCONTINUED | OUTPATIENT
Start: 2025-04-04 | End: 2025-04-05 | Stop reason: HOSPADM

## 2025-04-04 RX ORDER — HYDRALAZINE HYDROCHLORIDE 25 MG/1
25 TABLET, FILM COATED ORAL 3 TIMES DAILY
Status: DISCONTINUED | OUTPATIENT
Start: 2025-04-04 | End: 2025-04-05 | Stop reason: HOSPADM

## 2025-04-04 RX ADMIN — ACETAMINOPHEN 650 MG: 325 TABLET ORAL at 00:32

## 2025-04-04 RX ADMIN — POLYETHYLENE GLYCOL 3350 17 G: 17 POWDER, FOR SOLUTION ORAL at 09:15

## 2025-04-04 RX ADMIN — HYDROCHLOROTHIAZIDE 50 MG: 25 TABLET ORAL at 09:35

## 2025-04-04 RX ADMIN — HYDRALAZINE HYDROCHLORIDE 25 MG: 25 TABLET ORAL at 12:22

## 2025-04-04 RX ADMIN — HYDROMORPHONE HYDROCHLORIDE 0.5 MG: 1 INJECTION, SOLUTION INTRAMUSCULAR; INTRAVENOUS; SUBCUTANEOUS at 17:20

## 2025-04-04 RX ADMIN — SODIUM CHLORIDE, PRESERVATIVE FREE 10 ML: 5 INJECTION INTRAVENOUS at 20:48

## 2025-04-04 RX ADMIN — HYDROMORPHONE HYDROCHLORIDE 0.5 MG: 1 INJECTION, SOLUTION INTRAMUSCULAR; INTRAVENOUS; SUBCUTANEOUS at 20:55

## 2025-04-04 RX ADMIN — ACETAMINOPHEN 650 MG: 325 TABLET ORAL at 17:19

## 2025-04-04 RX ADMIN — HYDRALAZINE HYDROCHLORIDE 25 MG: 25 TABLET ORAL at 09:35

## 2025-04-04 RX ADMIN — ACETAMINOPHEN 650 MG: 325 TABLET ORAL at 06:48

## 2025-04-04 RX ADMIN — SENNOSIDES AND DOCUSATE SODIUM 1 TABLET: 50; 8.6 TABLET ORAL at 20:48

## 2025-04-04 RX ADMIN — FAMOTIDINE 20 MG: 20 TABLET, FILM COATED ORAL at 09:15

## 2025-04-04 RX ADMIN — FAMOTIDINE 20 MG: 20 TABLET, FILM COATED ORAL at 20:48

## 2025-04-04 RX ADMIN — TRAZODONE HYDROCHLORIDE 100 MG: 100 TABLET ORAL at 20:48

## 2025-04-04 RX ADMIN — SODIUM CHLORIDE, PRESERVATIVE FREE 10 ML: 5 INJECTION INTRAVENOUS at 09:16

## 2025-04-04 RX ADMIN — LISINOPRIL 40 MG: 20 TABLET ORAL at 09:35

## 2025-04-04 RX ADMIN — WARFARIN SODIUM 7.5 MG: 5 TABLET ORAL at 12:22

## 2025-04-04 RX ADMIN — PRAVASTATIN SODIUM 20 MG: 10 TABLET ORAL at 20:48

## 2025-04-04 RX ADMIN — HYDRALAZINE HYDROCHLORIDE 25 MG: 25 TABLET ORAL at 20:48

## 2025-04-04 RX ADMIN — ACETAMINOPHEN 650 MG: 325 TABLET ORAL at 12:22

## 2025-04-04 RX ADMIN — SENNOSIDES AND DOCUSATE SODIUM 1 TABLET: 50; 8.6 TABLET ORAL at 09:15

## 2025-04-04 RX ADMIN — OXYCODONE 10 MG: 5 TABLET ORAL at 14:16

## 2025-04-04 RX ADMIN — OXYCODONE 10 MG: 5 TABLET ORAL at 09:14

## 2025-04-04 RX ADMIN — OXYCODONE 10 MG: 5 TABLET ORAL at 00:31

## 2025-04-04 RX ADMIN — HYDROMORPHONE HYDROCHLORIDE 0.5 MG: 1 INJECTION, SOLUTION INTRAMUSCULAR; INTRAVENOUS; SUBCUTANEOUS at 06:48

## 2025-04-04 RX ADMIN — HYDROMORPHONE HYDROCHLORIDE 0.5 MG: 1 INJECTION, SOLUTION INTRAMUSCULAR; INTRAVENOUS; SUBCUTANEOUS at 11:37

## 2025-04-04 RX ADMIN — DULOXETINE HYDROCHLORIDE 60 MG: 60 CAPSULE, DELAYED RELEASE ORAL at 20:48

## 2025-04-04 RX ADMIN — OXYCODONE 10 MG: 5 TABLET ORAL at 04:20

## 2025-04-04 RX ADMIN — WATER 2000 MG: 1 INJECTION INTRAMUSCULAR; INTRAVENOUS; SUBCUTANEOUS at 00:32

## 2025-04-04 RX ADMIN — DULOXETINE HYDROCHLORIDE 60 MG: 60 CAPSULE, DELAYED RELEASE ORAL at 09:35

## 2025-04-04 RX ADMIN — OXYCODONE 10 MG: 5 TABLET ORAL at 18:49

## 2025-04-04 RX ADMIN — ONDANSETRON 4 MG: 4 TABLET, ORALLY DISINTEGRATING ORAL at 11:38

## 2025-04-04 ASSESSMENT — PAIN DESCRIPTION - LOCATION
LOCATION: KNEE
LOCATION: LEG
LOCATION: LEG
LOCATION: KNEE;LEG
LOCATION: KNEE;LEG
LOCATION: LEG
LOCATION: KNEE
LOCATION: LEG
LOCATION: KNEE

## 2025-04-04 ASSESSMENT — PAIN SCALES - GENERAL
PAINLEVEL_OUTOF10: 7
PAINLEVEL_OUTOF10: 8
PAINLEVEL_OUTOF10: 7
PAINLEVEL_OUTOF10: 7
PAINLEVEL_OUTOF10: 10
PAINLEVEL_OUTOF10: 8
PAINLEVEL_OUTOF10: 10
PAINLEVEL_OUTOF10: 8

## 2025-04-04 ASSESSMENT — PAIN DESCRIPTION - DESCRIPTORS
DESCRIPTORS: ACHING
DESCRIPTORS: ACHING;DISCOMFORT
DESCRIPTORS: ACHING;THROBBING
DESCRIPTORS: THROBBING;SHARP

## 2025-04-04 ASSESSMENT — PAIN - FUNCTIONAL ASSESSMENT
PAIN_FUNCTIONAL_ASSESSMENT: PREVENTS OR INTERFERES SOME ACTIVE ACTIVITIES AND ADLS
PAIN_FUNCTIONAL_ASSESSMENT: ACTIVITIES ARE NOT PREVENTED

## 2025-04-04 ASSESSMENT — PAIN DESCRIPTION - ORIENTATION
ORIENTATION: RIGHT

## 2025-04-04 ASSESSMENT — PAIN DESCRIPTION - PAIN TYPE: TYPE: SURGICAL PAIN

## 2025-04-04 NOTE — PLAN OF CARE
Problem: Occupational Therapy - Adult  Goal: By Discharge: Performs self-care activities at highest level of function for planned discharge setting.  See evaluation for individualized goals.  Description: FUNCTIONAL STATUS PRIOR TO ADMISSION:  Pt is independent at baseline and lives with her spouse. Pt works full time and uses cane for community mobility. Pt's daughter will be staying with her for a couple days to assist PRN. Daughter is a PTA.   , Prior Level of Assist for ADLs: Independent,  ,  ,  ,  ,  , Prior Level of Assist for Homemaking: Independent,  , Prior Level of Assist for Transfers: Independent, Active : Yes     HOME SUPPORT: Patient lived with spouse but did not require assistance.    Occupational Therapy Goals:  Initiated 4/4/2025  1.  Patient will perform grooming with Modified [place] within 7 day(s).  2.  Patient will perform lower body dressing with Modified [place] within 7 day(s).  3.  Patient will perform toilet transfers with Modified [place]  within 7 day(s).  4.  Patient will perform all aspects of toileting with Modified [place] within 7 day(s).   5.  Patient will utilize energy conservation techniques during functional activities with no verbal cues within 7 day(s).    Outcome: Progressing   OCCUPATIONAL THERAPY EVALUATION    Patient: Fallon Dowd (65 y.o. female)  Date: 4/4/2025  Primary Diagnosis: Osteoarthritis of right knee [M17.11]  Primary osteoarthritis of right knee [M17.11]  Procedure(s) (LRB):  RIGHT TOTAL KNEE ARTHROPLASTY (Right) 1 Day Post-Op     Precautions: Weight Bearing, Fall Risk Right Lower Extremity Weight Bearing: Weight Bearing As Tolerated                ASSESSMENT :  Pt is POD #1 R TKA. The patient is limited by decreased functional mobility, independence in ADLs, high-level IADLs, ROM, strength, activity tolerance, endurance, balance, increased pain levels.    Based on the impairments listed above Pt is requiring up to CGA for BADLs  which is below her baseline of Mod I. Pt reporting pain 4/10 increasing to 6/10 with functional mobility and BADLs. Pt hypertensive throughout session. Pt able to transfer to commode using RW with CGA and slow pace, toilet self with CGA for steadying assistance, and don LB clothing using AE CGA. Pt left seated in chair at end of session reporting increasing pain in R knee. Provided ice pack and repositioning. RN aware of hypertension and ongoing pain. Pt would benefit from 1 additional session in prep for discharge home.    Functional Outcome Measure:  The patient scored 20/24 on the [facility] outcome measure which is indicative of lower odds of benefiting from rehab at discharge in a facility versus home.         PLAN :  Recommendations and Planned Interventions:   self care training, therapeutic activities, functional mobility training, balance training, therapeutic exercise, endurance activities, patient education, and home safety training    Frequency/Duration: OT Plan of Care: 5 times/week    Recommendation for discharge: (in order for the patient to meet his/her long term goals):   Intermittent occupational therapy up to 2-3x/week in previous living setting    Pt and family requesting  OT.    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

## 2025-04-04 NOTE — PLAN OF CARE
Problem: Physical Therapy - Adult  Goal: By Discharge: Performs mobility at highest level of function for planned discharge setting.  See evaluation for individualized goals.  Description: FUNCTIONAL STATUS PRIOR TO ADMISSION: Patient was modified independent using a single point cane for functional mobility - as needed for pain and stability.    HOME SUPPORT PRIOR TO ADMISSION: The patient lived with spouse and did not require assistance.    Physical Therapy Goals  Initiated 4/3/2025  1.  Patient will move from supine to sit and sit to supine and scoot up and down in bed with modified independence within 4 day(s).    2.  Patient will perform sit to stand with modified independence within 4 day(s).  3.  Patient will transfer from bed to chair and chair to bed with modified independence using the least restrictive device within 4 day(s).  4.  Patient will ambulate with modified independence for > 150 feet with the least restrictive device within 4 day(s).   5.  Patient will ascend/descend 4 stairs with one handrail(s) with supervision within 4 day(s).  6. Patient will perform TKR home exercise program per protocol with supervision/set-up within 4 days.  7. Patient will demonstrate AROM 0-90 degrees in operative joint within 4 days.     4/4/2025 1900 by Korin Alford, PT  Outcome: Progressing  PHYSICAL THERAPY TREATMENT-AFTERNOON SESSION    Patient: Fallon Dowd (65 y.o. female)  Date: 4/4/2025  Diagnosis: Osteoarthritis of right knee [M17.11]  Primary osteoarthritis of right knee [M17.11] Osteoarthritis of right knee  Procedure(s) (LRB):  RIGHT TOTAL KNEE ARTHROPLASTY (Right) 1 Day Post-Op  Precautions: Weight Bearing, Fall Risk Right Lower Extremity Weight Bearing: Weight Bearing As Tolerated                  ASSESSMENT:  Patient continues to benefit from skilled PT services and is slowly progressing towards goals. Patient is now ambulating 20 ft with contact guard assist using rolling walker - to/from

## 2025-04-04 NOTE — PROGRESS NOTES
Occupational Therapy  04/04/25    Orders received, chart reviewed and patient evaluated by occupational therapy. Pending progression with skilled acute occupational therapy, recommend:    Intermittent occupational therapy up to 2-3x/week in previous living setting with additional support needed for IADLs    Recommend with nursing patient to complete as able in order to maintain strength, endurance and independence: OOB to chair 3x/day, ADLs with Spv and performing toileting with functional mobility to bathroom using RW SBA assist. Thank you for your assistance.     Full evaluation to follow.   Valarie Harmon, OT

## 2025-04-04 NOTE — PLAN OF CARE
Problem: Pain  Goal: Verbalizes/displays adequate comfort level or baseline comfort level  4/4/2025 0115 by Tala Cardoso RN  Outcome: Progressing  4/3/2025 1254 by Wili March RN  Outcome: Progressing     Problem: Safety - Adult  Goal: Free from fall injury  4/4/2025 0115 by Tala Cardoso RN  Outcome: Progressing  4/3/2025 1254 by Wili March RN  Outcome: Progressing     Problem: Discharge Planning  Goal: Discharge to home or other facility with appropriate resources  4/4/2025 0115 by Tala Cardoso RN  Outcome: Progressing  4/3/2025 1254 by Wili March RN  Outcome: Progressing

## 2025-04-04 NOTE — ANESTHESIA POSTPROCEDURE EVALUATION
Department of Anesthesiology  Postprocedure Note    Patient: Fallon Dowd  MRN: [id number]  YOB: 1960  Date of evaluation: 4/4/2025    Procedure Summary       Date: 04/03/25 Room / Location: [facility] MAIN OR [address] [place] [address] MAIN OR    Anesthesia Start: 0726 Anesthesia Stop: 1050    Procedure: RIGHT TOTAL KNEE ARTHROPLASTY (Right: Knee) Diagnosis:       Osteoarthritis of right knee      (Osteoarthritis of right knee [M17.11])    Providers: Jadyn Villavicencio MD Responsible Provider: Jessica Ortiz DO    Anesthesia Type: Spinal, MAC, Regional ASA Status: 3            Anesthesia Type: Spinal, MAC, Regional    Yolis Phase I: Yolis Score: 9    Yolis Phase II:      Anesthesia Post Evaluation    Patient location during evaluation: PACU  Level of consciousness: awake  Airway patency: patent  Nausea & Vomiting: no nausea  Cardiovascular status: hemodynamically stable  Respiratory status: acceptable  Hydration status: stable  Multimodal analgesia pain management approach  Pain management: adequate    No notable events documented.

## 2025-04-04 NOTE — CARE COORDINATION
[facility]:    [facility]   Family (valorie) transport  - patient lives with   Already owns walker      CM, met with patient, confirmed face sheet, discussed HH choice and sent referrals per patient choice. Observation Pt.     04/04/25 0992   Service Assessment   Patient Orientation Alert and Oriented   Cognition Alert   History Provided By Patient   Primary Caregiver Self   Support Systems Spouse/Significant Other;Children   Patient's Healthcare Decision Maker is: Legal Next of Kin   PCP Verified by CM Yes   Last Visit to PCP Within last 3 months   Prior Functional Level Independent in ADLs/IADLs   Current Functional Level Assistance with the following:;Mobility   Can patient return to prior living arrangement Yes   Ability to make needs known: Good   Family able to assist with home care needs: Yes   Discharge Planning   Patient expects to be discharged to: House   Services At/After Discharge   Confirm Follow Up Transport Family   Condition of Participation: Discharge Planning   The Plan for Transition of Care is related to the following treatment goals: Home Health   The Patient and/or Patient Representative was provided with a Choice of Provider? Patient   The Patient and/Or Patient Representative agree with the Discharge Plan? Yes   Freedom of Choice list was provided with basic dialogue that supports the patient's individualized plan of care/goals, treatment preferences, and shares the quality data associated with the providers?  Yes

## 2025-04-04 NOTE — PLAN OF CARE
Problem: Physical Therapy - Adult  Goal: By Discharge: Performs mobility at highest level of function for planned discharge setting.  See evaluation for individualized goals.  Description: FUNCTIONAL STATUS PRIOR TO ADMISSION: Patient was modified independent using a single point cane for functional mobility - as needed for pain and stability.    HOME SUPPORT PRIOR TO ADMISSION: The patient lived with spouse and did not require assistance.    Physical Therapy Goals  Initiated 4/3/2025  1.  Patient will move from supine to sit and sit to supine and scoot up and down in bed with modified independence within 4 day(s).    2.  Patient will perform sit to stand with modified independence within 4 day(s).  3.  Patient will transfer from bed to chair and chair to bed with modified independence using the least restrictive device within 4 day(s).  4.  Patient will ambulate with modified independence for > 150 feet with the least restrictive device within 4 day(s).   5.  Patient will ascend/descend 4 stairs with one handrail(s) with supervision within 4 day(s).  6. Patient will perform TKR home exercise program per protocol with supervision/set-up within 4 days.  7. Patient will demonstrate AROM 0-90 degrees in operative joint within 4 days.     Outcome: Not Progressing   PHYSICAL THERAPY TREATMENT    Patient: Fallon Dowd (65 y.o. female)  Date: 4/4/2025  Diagnosis: Osteoarthritis of right knee [M17.11]  Primary osteoarthritis of right knee [M17.11] Osteoarthritis of right knee  Procedure(s) (LRB):  RIGHT TOTAL KNEE ARTHROPLASTY (Right) 1 Day Post-Op  Precautions: Restrictions/Precautions  Restrictions/Precautions: Weight Bearing, Fall Risk  Activity Level: Up with Assist Lower Extremity Weight Bearing Restrictions  Right Lower Extremity Weight Bearing: Weight Bearing As Tolerated          ASSESSMENT:  Patient continues to benefit from skilled PT services and is not progressing towards goals. Pt reporting difficulty  left  Speed/Judy: Slow  Step Length: Left shortened  Swing Pattern: Right asymmetrical  Stance: Right decreased  Gait Abnormalities: Antalgic;Decreased step clearance                                                                                                                                                                                                                                    Intervention/Education specific to: \"knee replacement\"    Education provided to avoid resting in external rotation or knee flexion while in bed. Discussed avoidance of resting with a pillow under the knee but that a pillow from calf to heel is acceptable for short periods if it supports knee extension. Encouraged use of cryo therapy to aide in pain and edema control.    Pain Rating:  Right knee 9/10 - worst pain behind knee    Pain Intervention(s):   patient medicated for pain prior to session, ice, rest, and repositioning    Activity Tolerance:   Poor secondary to severe pain    After treatment:   Patient left in no apparent distress in bed, Call bell within reach, Caregiver / family present, and Side rails x3      COMMUNICATION/EDUCATION:   The patient's plan of care was discussed with: registered nurse    Patient Education  Education Provided: Mobility Training;Transfer Training  Education Method: Verbal  Barriers to Learning: None  Education Outcome: Verbalized understanding;Demonstrated understanding;Continued education needed      Korin Alford, PT  Minutes: 15

## 2025-04-04 NOTE — PROGRESS NOTES
Pharmacist Note - Warfarin Dosing  Consult provided for this 65 y.o. female to manage warfarin for VTE ppx following RIGHT TOTAL KNEE ARTHROPLASTY     INR Goal: 1.7- 2.2    Therapy Day: 1    Preop Dose: Villavicencio- none    Drugs that may increase INR: None  Drugs that may decrease INR: None  Other current anticoagulants/ drugs that may increase bleeding risk: None  Risk factors: Age > 65  Daily INR ordered: YES    Recent Labs     04/03/25  1330 04/04/25  0334   INR 1.0 1.1       Date               INR                 Dose  4/3  1.0                  7.5 mg  4/4  1.1  7.5 mg      Assessment/ Plan:  Will order warfarin 7.5 mg PO x 1 dose. Recent data supports using a higher initial dose of warfarin in patients with BMI >40.     Pharmacy will continue to monitor daily and adjust therapy as indicated.

## 2025-04-04 NOTE — PROGRESS NOTES
POD 1 Day Post-Op    Procedure:  Procedure(s) with comments:  RIGHT TOTAL KNEE ARTHROPLASTY - RIGHT TOTAL KNEE ARTHROPLASTY    Subjective:     Patient awake and laying in bed comfortably this morning.  She is postop day #1.  She reports that she has had some significant pain overnight, she has been using Tylenol and oxycodone and Dilaudid as needed.  Ice packs in place for further the operative site.  She has plans to return home with home health.  She denies numbness, tingling, calf pain.  She denies shortness of breath, fever, chills.    Objective:     Blood pressure 139/71, pulse 68, temperature 98.2 °F (36.8 °C), temperature source Oral, resp. rate 20, height 1.6 m (5' 3\"), weight 116.7 kg (257 lb 4.4 oz), SpO2 97%.  Temp (24hrs), Av °F (36.7 °C), Min:97.6 °F (36.4 °C), Max:98.2 °F (36.8 °C)       Physical Exam:  Examination of the right knee reveals that the dressing is clean and intact. Sensation is intact to light touch. Brisk capillary refill.  Calf is soft, supple, nontender to palpation.  Moves all toes.  Neurovascular examination intact.  Labs:   Lab Results   Component Value Date/Time    HGB 10.9 2025 02:13 PM    INR 1.1 2025 03:34 AM         Assessment:     Principal Problem:    Osteoarthritis of right knee  Active Problems:    Primary osteoarthritis of right knee  Resolved Problems:    * No resolved hospital problems. *      Plan/Recommendations/Medical Decision Making:     PLAN:  1) PT BID, OT - WBAT  2) Coumadin for DVT Prophylaxis - Pharmacy to dose. Encouraged early mobilization, bed exercises, and SCD use.  3) Pain control - scheduled tylenol and prn  oxycodone.  4) Post op care - Continue bowel regimen, encouraged IS. Straight cath per protocol. Ace wrap to be removed on POD1. Daily dry dressing changes.   5) begin discharge planning to return home with home health and family support once pain and under control.    Gilda Patrick PA-C

## 2025-04-05 VITALS
TEMPERATURE: 99 F | HEIGHT: 63 IN | WEIGHT: 257.28 LBS | HEART RATE: 73 BPM | RESPIRATION RATE: 18 BRPM | SYSTOLIC BLOOD PRESSURE: 150 MMHG | BODY MASS INDEX: 45.59 KG/M2 | DIASTOLIC BLOOD PRESSURE: 62 MMHG | OXYGEN SATURATION: 96 %

## 2025-04-05 LAB
INR PPP: 1.5 (ref 0.9–1.1)
PROTHROMBIN TIME: 15.9 SEC (ref 9.2–11.2)

## 2025-04-05 PROCEDURE — 2500000003 HC RX 250 WO HCPCS

## 2025-04-05 PROCEDURE — G0378 HOSPITAL OBSERVATION PER HR: HCPCS

## 2025-04-05 PROCEDURE — 97535 SELF CARE MNGMENT TRAINING: CPT

## 2025-04-05 PROCEDURE — 97116 GAIT TRAINING THERAPY: CPT

## 2025-04-05 PROCEDURE — 6370000000 HC RX 637 (ALT 250 FOR IP): Performed by: ORTHOPAEDIC SURGERY

## 2025-04-05 PROCEDURE — 6370000000 HC RX 637 (ALT 250 FOR IP): Performed by: NURSE PRACTITIONER

## 2025-04-05 PROCEDURE — 6370000000 HC RX 637 (ALT 250 FOR IP)

## 2025-04-05 PROCEDURE — 97530 THERAPEUTIC ACTIVITIES: CPT

## 2025-04-05 PROCEDURE — 85610 PROTHROMBIN TIME: CPT

## 2025-04-05 RX ORDER — WARFARIN SODIUM 4 MG/1
4 TABLET ORAL
Status: COMPLETED | OUTPATIENT
Start: 2025-04-05 | End: 2025-04-05

## 2025-04-05 RX ADMIN — HYDRALAZINE HYDROCHLORIDE 25 MG: 25 TABLET ORAL at 09:04

## 2025-04-05 RX ADMIN — FAMOTIDINE 20 MG: 20 TABLET, FILM COATED ORAL at 09:04

## 2025-04-05 RX ADMIN — LISINOPRIL 40 MG: 20 TABLET ORAL at 09:04

## 2025-04-05 RX ADMIN — POLYETHYLENE GLYCOL 3350 17 G: 17 POWDER, FOR SOLUTION ORAL at 09:04

## 2025-04-05 RX ADMIN — OXYCODONE 10 MG: 5 TABLET ORAL at 12:51

## 2025-04-05 RX ADMIN — DULOXETINE HYDROCHLORIDE 60 MG: 60 CAPSULE, DELAYED RELEASE ORAL at 09:05

## 2025-04-05 RX ADMIN — SENNOSIDES AND DOCUSATE SODIUM 1 TABLET: 50; 8.6 TABLET ORAL at 09:04

## 2025-04-05 RX ADMIN — SODIUM CHLORIDE, PRESERVATIVE FREE 10 ML: 5 INJECTION INTRAVENOUS at 09:05

## 2025-04-05 RX ADMIN — OXYCODONE 10 MG: 5 TABLET ORAL at 09:05

## 2025-04-05 RX ADMIN — ACETAMINOPHEN 650 MG: 325 TABLET ORAL at 00:47

## 2025-04-05 RX ADMIN — HYDROCHLOROTHIAZIDE 50 MG: 25 TABLET ORAL at 09:04

## 2025-04-05 RX ADMIN — ACETAMINOPHEN 650 MG: 325 TABLET ORAL at 11:35

## 2025-04-05 RX ADMIN — ACETAMINOPHEN 650 MG: 325 TABLET ORAL at 06:31

## 2025-04-05 RX ADMIN — WARFARIN SODIUM 4 MG: 4 TABLET ORAL at 11:36

## 2025-04-05 ASSESSMENT — PAIN DESCRIPTION - ORIENTATION
ORIENTATION: RIGHT

## 2025-04-05 ASSESSMENT — PAIN DESCRIPTION - DESCRIPTORS
DESCRIPTORS: ACHING

## 2025-04-05 ASSESSMENT — PAIN DESCRIPTION - FREQUENCY
FREQUENCY: CONTINUOUS
FREQUENCY: CONTINUOUS

## 2025-04-05 ASSESSMENT — PAIN SCALES - GENERAL
PAINLEVEL_OUTOF10: 8
PAINLEVEL_OUTOF10: 8
PAINLEVEL_OUTOF10: 7

## 2025-04-05 ASSESSMENT — PAIN DESCRIPTION - LOCATION
LOCATION: KNEE

## 2025-04-05 ASSESSMENT — PAIN - FUNCTIONAL ASSESSMENT
PAIN_FUNCTIONAL_ASSESSMENT: ACTIVITIES ARE NOT PREVENTED
PAIN_FUNCTIONAL_ASSESSMENT: ACTIVITIES ARE NOT PREVENTED

## 2025-04-05 ASSESSMENT — PAIN DESCRIPTION - PAIN TYPE
TYPE: ACUTE PAIN;SURGICAL PAIN
TYPE: ACUTE PAIN;SURGICAL PAIN

## 2025-04-05 ASSESSMENT — PAIN DESCRIPTION - ONSET
ONSET: ON-GOING
ONSET: ON-GOING

## 2025-04-05 NOTE — PLAN OF CARE
Problem: Pain  Goal: Verbalizes/displays adequate comfort level or baseline comfort level  Outcome: Progressing     Problem: Safety - Adult  Goal: Free from fall injury  Outcome: Progressing     Problem: Discharge Planning  Goal: Discharge to home or other facility with appropriate resources  Outcome: Progressing     Problem: Physical Therapy - Adult  Goal: By Discharge: Performs mobility at highest level of function for planned discharge setting.  See evaluation for individualized goals.  4/5/2025 1157 by Amara Middleton, PT  Outcome: Progressing  4/5/2025 1105 by Amara Middleton, PT  Outcome: Progressing     Problem: Occupational Therapy - Adult  Goal: By Discharge: Performs self-care activities at highest level of function for planned discharge setting.  See evaluation for individualized goals.  4/5/2025 1229 by Eugenie Lopes, MAYRA  Outcome: Progressing

## 2025-04-05 NOTE — PLAN OF CARE
Problem: Occupational Therapy - Adult  Goal: By Discharge: Performs self-care activities at highest level of function for planned discharge setting.  See evaluation for individualized goals.  Description: FUNCTIONAL STATUS PRIOR TO ADMISSION:  Pt is independent at baseline and lives with her spouse. Pt works full time and uses cane for community mobility. Pt's daughter will be staying with her for a couple days to assist PRN. Daughter is a PTA.   , Prior Level of Assist for ADLs: Independent,  ,  ,  ,  ,  , Prior Level of Assist for Homemaking: Independent,  , Prior Level of Assist for Transfers: Independent, Active : Yes     HOME SUPPORT: Patient lived with spouse but did not require assistance.    Occupational Therapy Goals:  Initiated 4/4/2025  1.  Patient will perform grooming with Modified [place] within 7 day(s).  2.  Patient will perform lower body dressing with Modified [place] within 7 day(s).  3.  Patient will perform toilet transfers with Modified [place]  within 7 day(s).  4.  Patient will perform all aspects of toileting with Modified [place] within 7 day(s).   5.  Patient will utilize energy conservation techniques during functional activities with no verbal cues within 7 day(s).    Outcome: Progressing     OCCUPATIONAL THERAPY TREATMENT  Patient: Fallon Dowd (65 y.o. female)  Date: 4/5/2025  Primary Diagnosis: Osteoarthritis of right knee [M17.11]  Primary osteoarthritis of right knee [M17.11]  Procedure(s) (LRB):  RIGHT TOTAL KNEE ARTHROPLASTY (Right) 2 Days Post-Op   Precautions: Weight Bearing, Fall Risk Right Lower Extremity Weight Bearing: Weight Bearing As Tolerated              Chart, occupational therapy assessment, plan of care, and goals were reviewed.    ASSESSMENT  Patient continues to benefit from skilled OT services and is progressing towards goals. Greeted sitting up in chair agreeable to therapy. Pt reports getting dressed with nightgown and underwear.

## 2025-04-05 NOTE — PROGRESS NOTES
POD 2 Days Post-Op    Procedure:  Procedure(s) with comments:  RIGHT TOTAL KNEE ARTHROPLASTY - RIGHT TOTAL KNEE ARTHROPLASTY    Subjective:   Received in bed.  Pain slightly better.  Has not cleared therapy yet.     Objective:     Blood pressure (!) 145/66, pulse 77, temperature 98.2 °F (36.8 °C), temperature source Oral, resp. rate 18, height 1.6 m (5' 3\"), weight 116.7 kg (257 lb 4.4 oz), SpO2 96%.  Temp (24hrs), Av.8 °F (37.1 °C), Min:98.2 °F (36.8 °C), Max:99.7 °F (37.6 °C)       Physical Exam:  Examination of the right knee reveals that the dressing is clean and intact. Sensation is intact to light touch. Brisk capillary refill.  Calf is soft, supple, nontender to palpation.  Moves all toes.  Neurovascular examination intact.  Labs:   Lab Results   Component Value Date/Time    HGB 10.9 2025 02:13 PM    INR 1.5 2025 03:56 AM         Assessment:     Principal Problem:    Osteoarthritis of right knee  Active Problems:    Primary osteoarthritis of right knee  Resolved Problems:    * No resolved hospital problems. *      Plan/Recommendations/Medical Decision Making:     PLAN:  1) PT BID, OT - WBAT  2) Coumadin for DVT Prophylaxis - Pharmacy to dose. Encouraged early mobilization, bed exercises, and SCD use.  3) Pain control - scheduled tylenol and prn  oxycodone.  4) Post op care - Continue bowel regimen, encouraged IS. Straight cath per protocol. Ace wrap to be removed on POD1. Daily dry dressing changes.   5) Discharge today pending PT clearance / pain control     Lindsey Mauricio PA-C

## 2025-04-05 NOTE — PLAN OF CARE
Problem: Physical Therapy - Adult  Goal: By Discharge: Performs mobility at highest level of function for planned discharge setting.  See evaluation for individualized goals.  Description: FUNCTIONAL STATUS PRIOR TO ADMISSION: Patient was modified independent using a single point cane for functional mobility - as needed for pain and stability.    HOME SUPPORT PRIOR TO ADMISSION: The patient lived with spouse and did not require assistance.    Physical Therapy Goals  Initiated 4/3/2025  1.  Patient will move from supine to sit and sit to supine and scoot up and down in bed with modified independence within 4 day(s).    2.  Patient will perform sit to stand with modified independence within 4 day(s).  3.  Patient will transfer from bed to chair and chair to bed with modified independence using the least restrictive device within 4 day(s).  4.  Patient will ambulate with modified independence for > 150 feet with the least restrictive device within 4 day(s).   5.  Patient will ascend/descend 4 stairs with one handrail(s) with supervision within 4 day(s).  6. Patient will perform TKR home exercise program per protocol with supervision/set-up within 4 days.  7. Patient will demonstrate AROM 0-90 degrees in operative joint within 4 days.     4/5/2025 1157 by Amara Middleton, PT  Outcome: Progressing  4/5/2025 1105 by Amara Middleton, PT  Outcome: Progressing    PHYSICAL THERAPY TREATMENT    Patient: Fallon Dowd (65 y.o. female)  Date: 4/5/2025  Diagnosis: Osteoarthritis of right knee [M17.11]  Primary osteoarthritis of right knee [M17.11] Osteoarthritis of right knee  Procedure(s) (LRB):  RIGHT TOTAL KNEE ARTHROPLASTY (Right) 2 Days Post-Op  Precautions: Restrictions/Precautions  Restrictions/Precautions: Weight Bearing, Fall Risk  Activity Level: Up with Assist Lower Extremity Weight Bearing Restrictions  Right Lower Extremity Weight Bearing: Weight Bearing As Tolerated          ASSESSMENT:  Patient continues to

## 2025-04-05 NOTE — PLAN OF CARE
Problem: Physical Therapy - Adult  Goal: By Discharge: Performs mobility at highest level of function for planned discharge setting.  See evaluation for individualized goals.  Description: FUNCTIONAL STATUS PRIOR TO ADMISSION: Patient was modified independent using a single point cane for functional mobility - as needed for pain and stability.    HOME SUPPORT PRIOR TO ADMISSION: The patient lived with spouse and did not require assistance.    Physical Therapy Goals  Initiated 4/3/2025  1.  Patient will move from supine to sit and sit to supine and scoot up and down in bed with modified independence within 4 day(s).    2.  Patient will perform sit to stand with modified independence within 4 day(s).  3.  Patient will transfer from bed to chair and chair to bed with modified independence using the least restrictive device within 4 day(s).  4.  Patient will ambulate with modified independence for > 150 feet with the least restrictive device within 4 day(s).   5.  Patient will ascend/descend 4 stairs with one handrail(s) with supervision within 4 day(s).  6. Patient will perform TKR home exercise program per protocol with supervision/set-up within 4 days.  7. Patient will demonstrate AROM 0-90 degrees in operative joint within 4 days.     Outcome: Progressing     PHYSICAL THERAPY TREATMENT    Patient: Fallon Dowd (65 y.o. female)  Date: 4/5/2025  Diagnosis: Osteoarthritis of right knee [M17.11]  Primary osteoarthritis of right knee [M17.11] Osteoarthritis of right knee  Procedure(s) (LRB):  RIGHT TOTAL KNEE ARTHROPLASTY (Right) 2 Days Post-Op  Precautions: Restrictions/Precautions  Restrictions/Precautions: Weight Bearing, Fall Risk  Activity Level: Up with Assist Lower Extremity Weight Bearing Restrictions  Right Lower Extremity Weight Bearing: Weight Bearing As Tolerated          ASSESSMENT:  Patient continues to benefit from skilled PT services and is progressing towards goals. Pt received in bed, agreeable  left  Speed/Judy: Slow  Step Length: Left shortened  Swing Pattern: Right asymmetrical  Stance: Right decreased  Gait Abnormalities: Antalgic;Decreased step clearance                     Pain Ratin-8/10   Pain Intervention(s):   nursing notified and addressing, ice, elevation, and repositioning    Activity Tolerance:   Fair     After treatment:   Patient left in no apparent distress sitting up in chair, Call bell within reach, and Bed/ chair alarm activated      COMMUNICATION/EDUCATION:   The patient's plan of care was discussed with: occupational therapist and registered nurse    Patient Education  Education Given To: Patient  Education Provided: Mobility Training;Transfer Training  Education Method: Verbal  Barriers to Learning: None  Education Outcome: Verbalized understanding;Demonstrated understanding;Continued education needed      Amara Middleton, PT  Minutes: 19

## 2025-04-05 NOTE — PROGRESS NOTES
Pharmacist Note - Warfarin Dosing  Consult provided for this 65 y.o. female to manage warfarin for VTE ppx following RIGHT TOTAL KNEE ARTHROPLASTY     INR Goal: 1.7- 2.2    Therapy Day: 1    Preop Dose: Villavicencio- none    Drugs that may increase INR: None  Drugs that may decrease INR: None  Other current anticoagulants/ drugs that may increase bleeding risk: None  Risk factors: Age > 65  Daily INR ordered: YES    Recent Labs     04/03/25  1330 04/04/25  0334 04/05/25  0356   INR 1.0 1.1 1.5*       Date               INR                 Dose  4/3  1.0                  7.5 mg  4/4  1.1  7.5 mg  4/5                   1.5                  4 mg      Assessment/ Plan:  Will order warfarin 4 mg PO x 1 dose    Pharmacy will continue to monitor daily and adjust therapy as indicated.

## 2025-04-21 ENCOUNTER — TELEPHONE (OUTPATIENT)
Facility: CLINIC | Age: 65
End: 2025-04-21

## 2025-04-21 DIAGNOSIS — I10 HYPERTENSION, ESSENTIAL, BENIGN: Primary | ICD-10-CM

## 2025-04-21 DIAGNOSIS — E55.9 VITAMIN D DEFICIENCY: ICD-10-CM

## 2025-04-21 DIAGNOSIS — D64.9 NORMOCYTIC ANEMIA: ICD-10-CM

## 2025-04-21 DIAGNOSIS — E78.00 HYPERCHOLESTEROLEMIA: ICD-10-CM

## 2025-04-23 ENCOUNTER — LAB (OUTPATIENT)
Facility: CLINIC | Age: 65
End: 2025-04-23

## 2025-04-23 DIAGNOSIS — D64.9 NORMOCYTIC ANEMIA: ICD-10-CM

## 2025-04-23 DIAGNOSIS — E55.9 VITAMIN D DEFICIENCY: ICD-10-CM

## 2025-04-23 DIAGNOSIS — I10 HYPERTENSION, ESSENTIAL, BENIGN: ICD-10-CM

## 2025-04-23 NOTE — TELEPHONE ENCOUNTER
PCP: Awa Betancourt MD     Last appt:  3/14/2025      Future Appointments   Date Time Provider Department Center   4/23/2025  2:30 PM LAB [facility] DEP   4/30/2025  2:00 PM Awa Betancourt MD [facility]   5/12/2025  9:20 AM Jadyn Villavicencio MD [facility] BS [facility]          Requested Prescriptions     Pending Prescriptions Disp Refills    hydrALAZINE (APRESOLINE) 25 MG tablet 90 tablet 2     Sig: Take 1 tablet by mouth 3 times daily

## 2025-04-24 ENCOUNTER — RESULTS FOLLOW-UP (OUTPATIENT)
Facility: CLINIC | Age: 65
End: 2025-04-24

## 2025-04-24 LAB
25(OH)D3 SERPL-MCNC: 51.5 NG/ML (ref 30–100)
FERRITIN SERPL-MCNC: 53 NG/ML (ref 8–252)
IRON SATN MFR SERPL: 7 % (ref 20–50)
IRON SERPL-MCNC: 28 UG/DL (ref 35–150)
TIBC SERPL-MCNC: 409 UG/DL (ref 250–450)

## 2025-04-24 RX ORDER — HYDRALAZINE HYDROCHLORIDE 25 MG/1
25 TABLET, FILM COATED ORAL 3 TIMES DAILY
Qty: 90 TABLET | Refills: 2 | Status: SHIPPED | OUTPATIENT
Start: 2025-04-24

## 2025-04-25 SDOH — HEALTH STABILITY: PHYSICAL HEALTH: ON AVERAGE, HOW MANY MINUTES DO YOU ENGAGE IN EXERCISE AT THIS LEVEL?: 0 MIN

## 2025-04-25 SDOH — HEALTH STABILITY: PHYSICAL HEALTH: ON AVERAGE, HOW MANY DAYS PER WEEK DO YOU ENGAGE IN MODERATE TO STRENUOUS EXERCISE (LIKE A BRISK WALK)?: 0 DAYS

## 2025-04-25 ASSESSMENT — PATIENT HEALTH QUESTIONNAIRE - PHQ9
SUM OF ALL RESPONSES TO PHQ QUESTIONS 1-9: 0
1. LITTLE INTEREST OR PLEASURE IN DOING THINGS: NOT AT ALL
SUM OF ALL RESPONSES TO PHQ QUESTIONS 1-9: 0
2. FEELING DOWN, DEPRESSED OR HOPELESS: NOT AT ALL
SUM OF ALL RESPONSES TO PHQ QUESTIONS 1-9: 0
SUM OF ALL RESPONSES TO PHQ QUESTIONS 1-9: 0

## 2025-04-25 ASSESSMENT — LIFESTYLE VARIABLES
HOW OFTEN DO YOU HAVE SIX OR MORE DRINKS ON ONE OCCASION: 1
HOW MANY STANDARD DRINKS CONTAINING ALCOHOL DO YOU HAVE ON A TYPICAL DAY: PATIENT DOES NOT DRINK
HOW MANY STANDARD DRINKS CONTAINING ALCOHOL DO YOU HAVE ON A TYPICAL DAY: 0
HOW OFTEN DO YOU HAVE A DRINK CONTAINING ALCOHOL: 1
HOW OFTEN DO YOU HAVE A DRINK CONTAINING ALCOHOL: NEVER

## 2025-04-30 ENCOUNTER — OFFICE VISIT (OUTPATIENT)
Facility: CLINIC | Age: 65
End: 2025-04-30
Payer: MEDICARE

## 2025-04-30 VITALS
RESPIRATION RATE: 16 BRPM | SYSTOLIC BLOOD PRESSURE: 114 MMHG | TEMPERATURE: 98 F | DIASTOLIC BLOOD PRESSURE: 71 MMHG | WEIGHT: 256 LBS | BODY MASS INDEX: 45.36 KG/M2 | OXYGEN SATURATION: 95 % | HEART RATE: 90 BPM | HEIGHT: 63 IN

## 2025-04-30 DIAGNOSIS — E66.813 OBESITY, CLASS 3 (HCC): ICD-10-CM

## 2025-04-30 DIAGNOSIS — F41.1 GENERALIZED ANXIETY DISORDER: ICD-10-CM

## 2025-04-30 DIAGNOSIS — D50.9 IRON DEFICIENCY ANEMIA, UNSPECIFIED IRON DEFICIENCY ANEMIA TYPE: ICD-10-CM

## 2025-04-30 DIAGNOSIS — I10 HYPERTENSION, ESSENTIAL, BENIGN: ICD-10-CM

## 2025-04-30 DIAGNOSIS — G47.00 INSOMNIA, UNSPECIFIED TYPE: ICD-10-CM

## 2025-04-30 DIAGNOSIS — Z96.651 STATUS POST RIGHT KNEE REPLACEMENT: ICD-10-CM

## 2025-04-30 DIAGNOSIS — Z00.00 MEDICARE ANNUAL WELLNESS VISIT, SUBSEQUENT: Primary | ICD-10-CM

## 2025-04-30 PROCEDURE — G8417 CALC BMI ABV UP PARAM F/U: HCPCS | Performed by: INTERNAL MEDICINE

## 2025-04-30 PROCEDURE — 3074F SYST BP LT 130 MM HG: CPT | Performed by: INTERNAL MEDICINE

## 2025-04-30 PROCEDURE — G0439 PPPS, SUBSEQ VISIT: HCPCS | Performed by: INTERNAL MEDICINE

## 2025-04-30 PROCEDURE — 1160F RVW MEDS BY RX/DR IN RCRD: CPT | Performed by: INTERNAL MEDICINE

## 2025-04-30 PROCEDURE — 3017F COLORECTAL CA SCREEN DOC REV: CPT | Performed by: INTERNAL MEDICINE

## 2025-04-30 PROCEDURE — G8400 PT W/DXA NO RESULTS DOC: HCPCS | Performed by: INTERNAL MEDICINE

## 2025-04-30 PROCEDURE — 1159F MED LIST DOCD IN RCRD: CPT | Performed by: INTERNAL MEDICINE

## 2025-04-30 PROCEDURE — G8427 DOCREV CUR MEDS BY ELIG CLIN: HCPCS | Performed by: INTERNAL MEDICINE

## 2025-04-30 PROCEDURE — 1125F AMNT PAIN NOTED PAIN PRSNT: CPT | Performed by: INTERNAL MEDICINE

## 2025-04-30 PROCEDURE — 99214 OFFICE O/P EST MOD 30 MIN: CPT | Performed by: INTERNAL MEDICINE

## 2025-04-30 PROCEDURE — 1123F ACP DISCUSS/DSCN MKR DOCD: CPT | Performed by: INTERNAL MEDICINE

## 2025-04-30 PROCEDURE — 1036F TOBACCO NON-USER: CPT | Performed by: INTERNAL MEDICINE

## 2025-04-30 PROCEDURE — 1090F PRES/ABSN URINE INCON ASSESS: CPT | Performed by: INTERNAL MEDICINE

## 2025-04-30 PROCEDURE — 3078F DIAST BP <80 MM HG: CPT | Performed by: INTERNAL MEDICINE

## 2025-04-30 RX ORDER — ZOLPIDEM TARTRATE 5 MG/1
5 TABLET ORAL NIGHTLY PRN
Qty: 14 TABLET | Refills: 0 | Status: SHIPPED | OUTPATIENT
Start: 2025-04-30 | End: 2025-05-14

## 2025-04-30 RX ORDER — ASPIRIN 325 MG
325 TABLET, DELAYED RELEASE (ENTERIC COATED) ORAL 2 TIMES DAILY
COMMUNITY

## 2025-04-30 ASSESSMENT — PATIENT HEALTH QUESTIONNAIRE - PHQ9
1. LITTLE INTEREST OR PLEASURE IN DOING THINGS: NOT AT ALL
6. FEELING BAD ABOUT YOURSELF - OR THAT YOU ARE A FAILURE OR HAVE LET YOURSELF OR YOUR FAMILY DOWN: NOT AT ALL
SUM OF ALL RESPONSES TO PHQ QUESTIONS 1-9: 1
SUM OF ALL RESPONSES TO PHQ QUESTIONS 1-9: 1
3. TROUBLE FALLING OR STAYING ASLEEP: NOT AT ALL
5. POOR APPETITE OR OVEREATING: NOT AT ALL
SUM OF ALL RESPONSES TO PHQ QUESTIONS 1-9: 1
8. MOVING OR SPEAKING SO SLOWLY THAT OTHER PEOPLE COULD HAVE NOTICED. OR THE OPPOSITE, BEING SO FIGETY OR RESTLESS THAT YOU HAVE BEEN MOVING AROUND A LOT MORE THAN USUAL: NOT AT ALL
4. FEELING TIRED OR HAVING LITTLE ENERGY: SEVERAL DAYS
SUM OF ALL RESPONSES TO PHQ QUESTIONS 1-9: 1
2. FEELING DOWN, DEPRESSED OR HOPELESS: NOT AT ALL
9. THOUGHTS THAT YOU WOULD BE BETTER OFF DEAD, OR OF HURTING YOURSELF: NOT AT ALL
7. TROUBLE CONCENTRATING ON THINGS, SUCH AS READING THE NEWSPAPER OR WATCHING TELEVISION: NOT AT ALL

## 2025-04-30 NOTE — PROGRESS NOTES
Have you been to the ER, urgent care clinic since your last visit?  Hospitalized since your last visit?   NO    Have you seen or consulted any other health care providers outside our system since your last visit?   NO    Have you had a mammogram?”   NO    Date of last Mammogram: 8/3/2022

## 2025-04-30 NOTE — PROGRESS NOTES
Medicare Annual Wellness Visit    Fallon Dowd is here for Medicare AWV (MEDICARE ANNUAL WELL VISIT -  if symptoms worsen or fail to improve, for Medicare AWV; have non-fasting blood work 1 week before appt.)    Assessment & Plan  1. Medicare annual wellness visit, subsequent    2. Hypertension, essential, benign  Controlled.     3. Status post right knee replacement  Healing well. To start outpatient physical therapy soon.  - On aspirin 325 mg twice daily until appointment with Dr. Henry on 05/12/2025    4. Insomnia, unspecified type  - Start zolpidem (AMBIEN) 5 MG tablet; Take 1 tablet by mouth nightly as needed for Sleep for up to 14 days. Max Daily Amount: 5 mg  Dispense: 14 tablet; Refill: 0 for Sleep for up to 14 days. Max Daily Amount: 5 mg  Dispense: 14 tablet; Refill: 0  - Consider over-the-counter melatonin.  - Adjust Ambien dosage if ineffective.    5. Obesity, class 3 (E66.813)  - Counseled on diet, exercise, and weight loss.  - Considering resuming Contrave for weight loss after she is on lower dose of aspirin or off it completely.    6. Iron deficiency anemia  - Resume iron supplementation (Slow Fe or Feosol) one tablet daily; adjust to every other day if constipation occurs.    7. Generalized anxiety disorder  Has heart palpitations and chest discomfort, both likely stress-related.    - Schedule mammogram and bone density test once better recovered from right TKR.      ICD-10-CM    1. Medicare annual wellness visit, subsequent  Z00.00       2. Hypertension, essential, benign  I10       3. Status post right knee replacement  Z96.651       4. Insomnia, unspecified type  G47.00 zolpidem (AMBIEN) 5 MG tablet      5. Obesity, class 3 (E66.813)  E66.813       6. Iron deficiency anemia, unspecified iron deficiency anemia type  D50.9       7. Generalized anxiety disorder  F41.1            Return in about 4 months (around 8/30/2025), or if symptoms worsen or fail to improve, for Wt mgt, anx/dep.

## 2025-05-05 DIAGNOSIS — E66.01 MORBID OBESITY WITH BMI OF 45.0-49.9, ADULT (HCC): ICD-10-CM

## 2025-05-05 RX ORDER — NALTREXONE HYDROCHLORIDE AND BUPROPION HYDROCHLORIDE 8; 90 MG/1; MG/1
TABLET, EXTENDED RELEASE ORAL
Qty: 90 TABLET | Refills: 1 | Status: SHIPPED | OUTPATIENT
Start: 2025-05-05 | End: 2025-05-08 | Stop reason: SDUPTHER

## 2025-05-05 NOTE — TELEPHONE ENCOUNTER
Future Appointments:  Future Appointments   Date Time Provider Department Center   5/12/2025  9:20 AM Jadyn Villavicencio MD [facility]   9/15/2025  9:30 AM Awa Betancourt MD [facility] DEP        Last Appointment With Me:  4/30/2025     Requested Prescriptions     Pending Prescriptions Disp Refills    naltrexone-buPROPion (CONTRAVE) 8-90 MG per extended release tablet [Pharmacy Med Name: CONTRAVE ER 8-90 MG TABLET] 120 tablet 0     Sig: Take one tablet by mouth daily for 2 weeks then 1 tablet twice a day

## 2025-05-08 DIAGNOSIS — E66.01 MORBID OBESITY WITH BMI OF 45.0-49.9, ADULT (HCC): ICD-10-CM

## 2025-05-08 RX ORDER — NALTREXONE HYDROCHLORIDE AND BUPROPION HYDROCHLORIDE 8; 90 MG/1; MG/1
TABLET, EXTENDED RELEASE ORAL
Qty: 270 TABLET | Refills: 1 | Status: SHIPPED | OUTPATIENT
Start: 2025-05-08

## 2025-05-08 NOTE — TELEPHONE ENCOUNTER
PCP: Awa Betancourt MD     Last appt:  4/30/2025      Future Appointments   Date Time Provider Department Center   5/12/2025  9:20 AM Jadyn Villavicencio MD [facility]   9/15/2025  9:30 AM Awa Betancourt MD [facility]          Requested Prescriptions     Pending Prescriptions Disp Refills    naltrexone-buPROPion (CONTRAVE) 8-90 MG per extended release tablet 270 tablet 1     Sig: Take 2 tablets by mouth in the morning and 1 tablet in evening.

## 2025-05-15 ENCOUNTER — PATIENT MESSAGE (OUTPATIENT)
Facility: CLINIC | Age: 65
End: 2025-05-15

## 2025-05-15 DIAGNOSIS — G47.00 INSOMNIA, UNSPECIFIED TYPE: Primary | ICD-10-CM

## 2025-05-17 RX ORDER — ZOLPIDEM TARTRATE 5 MG/1
5 TABLET ORAL NIGHTLY PRN
Qty: 30 TABLET | Refills: 0 | Status: SHIPPED | OUTPATIENT
Start: 2025-05-17 | End: 2025-06-16

## 2025-07-02 DIAGNOSIS — I10 HYPERTENSION, ESSENTIAL, BENIGN: ICD-10-CM

## 2025-07-02 DIAGNOSIS — E78.00 HYPERCHOLESTEROLEMIA: ICD-10-CM

## 2025-07-02 DIAGNOSIS — F41.1 GENERALIZED ANXIETY DISORDER: ICD-10-CM

## 2025-07-02 DIAGNOSIS — F32.1 CURRENT MODERATE EPISODE OF MAJOR DEPRESSIVE DISORDER WITHOUT PRIOR EPISODE (HCC): ICD-10-CM

## 2025-07-02 NOTE — TELEPHONE ENCOUNTER
PCP: Awa Betancourt MD     Last appt:  4/30/2025      Future Appointments   Date Time Provider Department Center   7/7/2025 10:00 AM Jadyn Villavicencio MD [facility]   9/15/2025  9:30 AM Awa Betancourt MD [facility]          Requested Prescriptions     Pending Prescriptions Disp Refills    DULoxetine (CYMBALTA) 60 MG extended release capsule 180 capsule 2     Sig: Take 1 capsule by mouth 2 times daily    lisinopril-hydroCHLOROthiazide (PRINZIDE;ZESTORETIC) 20-12.5 MG per tablet 180 tablet 2     Sig: Take 2 tablets by mouth daily    pravastatin (PRAVACHOL) 20 MG tablet 90 tablet 2     Sig: Take 1 tablet by mouth nightly

## 2025-07-03 RX ORDER — DULOXETIN HYDROCHLORIDE 60 MG/1
60 CAPSULE, DELAYED RELEASE ORAL 2 TIMES DAILY
Qty: 180 CAPSULE | Refills: 3 | Status: SHIPPED | OUTPATIENT
Start: 2025-07-03

## 2025-07-03 RX ORDER — PRAVASTATIN SODIUM 20 MG
20 TABLET ORAL NIGHTLY
Qty: 90 TABLET | Refills: 3 | Status: SHIPPED | OUTPATIENT
Start: 2025-07-03

## 2025-07-03 RX ORDER — LISINOPRIL AND HYDROCHLOROTHIAZIDE 12.5; 2 MG/1; MG/1
2 TABLET ORAL DAILY
Qty: 180 TABLET | Refills: 3 | Status: SHIPPED | OUTPATIENT
Start: 2025-07-03

## 2025-07-23 DIAGNOSIS — I10 HYPERTENSION, ESSENTIAL, BENIGN: ICD-10-CM

## 2025-07-23 RX ORDER — HYDRALAZINE HYDROCHLORIDE 25 MG/1
25 TABLET, FILM COATED ORAL 3 TIMES DAILY
Qty: 90 TABLET | Refills: 2 | Status: SHIPPED | OUTPATIENT
Start: 2025-07-23

## 2025-07-23 NOTE — TELEPHONE ENCOUNTER
PCP: Awa Betancourt MD     Last appt:  4/30/2025      Future Appointments   Date Time Provider Department Center   9/15/2025  9:30 AM Awa Betancourt MD [facility]   10/6/2025  2:30 PM Jadyn Villavicencio MD [facility]          Requested Prescriptions     Pending Prescriptions Disp Refills    hydrALAZINE (APRESOLINE) 25 MG tablet 90 tablet 2     Sig: Take 1 tablet by mouth 3 times daily

## (undated) DEVICE — SOLUTION IRRIG 1000ML H2O PIC PLAS SHATTERPROOF CONTAINER

## (undated) DEVICE — HYPODERMIC SAFETY NEEDLE: Brand: MAGELLAN

## (undated) DEVICE — SOL IRR SOD CHL 0.9% TITAN XL CNTNR 3000ML

## (undated) DEVICE — HYPODERMIC SAFETY NEEDLE: Brand: MONOJECT

## (undated) DEVICE — SOLUTION IRRIG 1000ML 0.9% SOD CHL USP POUR PLAS BTL

## (undated) DEVICE — X-RAY DETECTABLE SPONGES,16 PLY: Brand: VISTEC

## (undated) DEVICE — BANDAGE,GAUZE,CONFORMING,3"X75",STRL,LF: Brand: MEDLINE

## (undated) DEVICE — SUTURE VICRYL COATED ABSORBABLE BRAIDED 2-0 TP1 54 IN COAT UD VICRYL + VCP880T

## (undated) DEVICE — PACK SURG PROC KNEE USER GPS

## (undated) DEVICE — SUTURE VICRYL ABSORBABLE BRAIDED 2-0 CT 36 IN DA UD  VCP957H

## (undated) DEVICE — BLADE,CARBON-STEEL,15,STRL,DISPOSABLE,TB: Brand: MEDLINE

## (undated) DEVICE — STRIP,CLOSURE,WOUND,MEDI-STRIP,1/2X4: Brand: MEDLINE

## (undated) DEVICE — TUBING SUCT 12FR MAL ALUM SHFT FN CAP VENT UNIV CONN W/ OBT

## (undated) DEVICE — BANDAGE,GAUZE,CONFORMING,4"X75",STRL,LF: Brand: MEDLINE

## (undated) DEVICE — BANDAGE COMPR W6INXL5YD WHT BGE POLY COT M E WRP WV HK AND

## (undated) DEVICE — DISPOSABLE TOURNIQUET CUFF SINGLE BLADDER, DUAL PORT AND QUICK CONNECT CONNECTOR: Brand: COLOR CUFF

## (undated) DEVICE — PAD,ABDOMINAL,5"X9",ST,LF,25/BX: Brand: MEDLINE INDUSTRIES, INC.

## (undated) DEVICE — Z DISCONTINUED SPONGE LAPAROTOMY W18XL18IN WHITE STRUNG RADIOPAQUE STERILE

## (undated) DEVICE — SUTURE STRATAFIX SYMMETRIC PDS + SZ 1 L18IN ABSRB VLT L48MM SXPP1A400

## (undated) DEVICE — SPONGE GZ W4XL4IN COT 12 PLY TYP VII WVN C FLD DSGN STERILE

## (undated) DEVICE — SUTURE VICRYL SZ 0 L27IN ABSRB UD L36MM CT-1 1/2 CIR J260H

## (undated) DEVICE — BANDAGE COMPR M W6INXL10YD WHT BGE VELC E MTRX HK AND LOOP

## (undated) DEVICE — GLOVE ORANGE PI 7 1/2   MSG9075

## (undated) DEVICE — SUTURE VICRYL + SZ 4-0 L27IN ABSRB WHT FS-2 3/8 CIR REV CUT VCP422H

## (undated) DEVICE — SUTURE VICRYL SZ 5-0 L18IN ABSRB UD L13MM P-3 3/8 CIR PRIM J493G

## (undated) DEVICE — SET GRAV CK VLV NEEDLESS ST 3 GANGED 4WAY STPCOCK HI FLO 10

## (undated) DEVICE — ENDOSCOPIC KIT COMPLIANCE ENDOKIT

## (undated) DEVICE — PADDING UNDERCAST W4INXL12FT RAYON POLY SYN NONADHESIVE

## (undated) DEVICE — GARMENT,MEDLINE,DVT,INT,CALF,LG, GEN2: Brand: MEDLINE

## (undated) DEVICE — SYRINGE,EAR/ULCER, 2 OZ, STERILE: Brand: MEDLINE

## (undated) DEVICE — SOLUTION SURG PREP 26 CC PURPREP

## (undated) DEVICE — GOWN,NON-REINFORCED,XXL: Brand: MEDLINE

## (undated) DEVICE — RECIPROCATING BLADE HEAVY DUTY LONG, OFFSET  (77.6 X 0.77 X 11.2MM)

## (undated) DEVICE — ZIMMER® STERILE DISPOSABLE TOURNIQUET CUFF WITH PLC, DUAL PORT, SINGLE BLADDER, 24 IN. (61 CM)

## (undated) DEVICE — PADDING UNDERCAST W3INXL12FT RAYON POLY SYN NONADHESIVE

## (undated) DEVICE — COVER,MAYO STAND,STERILE: Brand: MEDLINE

## (undated) DEVICE — C-ARM: Brand: UNBRANDED

## (undated) DEVICE — BANDAGE COMPR W4INXL5YD WHT BGE POLY COT M E WRP WV HK AND

## (undated) DEVICE — IV START KIT: Brand: MEDLINE

## (undated) DEVICE — GOWN,SIRUS,NONRNF,SETINSLV,2XL,18/CS: Brand: MEDLINE

## (undated) DEVICE — CONTAINER,SPECIMEN,3OZ,OR STRL: Brand: MEDLINE

## (undated) DEVICE — SUTURE MCRYL + SZ 4-0 L27IN ABSRB UD PS1 L24MM 3/8 CIR REV MCP935H

## (undated) DEVICE — SUTURE VICRYL SZ 2-0 L27IN ABSRB UD L26MM SH 1/2 CIR J417H

## (undated) DEVICE — APPLICATOR MEDICATED 26 CC SOLUTION HI LT ORNG CHLORAPREP

## (undated) DEVICE — DRAPE,EXTREMITY,89X128,STERILE: Brand: MEDLINE

## (undated) DEVICE — STRYKER PERFORMANCE SERIES SAGITTAL BLADE: Brand: STRYKER PERFORMANCE SERIES

## (undated) DEVICE — GLOVE ORANGE PI 8 1/2   MSG9085

## (undated) DEVICE — PRECISION THIN EXTENDED SHANK (33.5 X 0.38MM)

## (undated) DEVICE — SUTURE MONOCRYL SZ 5-0 L18IN ABSRB UD PC-3 L16MM 3/8 CIR Y844G

## (undated) DEVICE — ZIMMER® STERILE DISPOSABLE TOURNIQUET CUFF WITH PLC, DUAL PORT, SINGLE BLADDER, 34 IN. (86 CM)

## (undated) DEVICE — YANKAUER,FLEXIBLE HANDLE,REGLR CAPACITY: Brand: MEDLINE INDUSTRIES, INC.

## (undated) DEVICE — SUTURE VICRYL + SZ 3-0 L27IN ABSRB UD L26MM SH 1/2 CIR VCP416H

## (undated) DEVICE — PRECISION THIN (9.0 X 0.38 X 31.0MM)

## (undated) DEVICE — TIP SUCT TRNSPAR RIB SURF STD BLB RIG NVENT W/ 5IN1 CONN DYND50138] MEDLINE INDUSTRIES INC]

## (undated) DEVICE — EXTREMITY - SMH: Brand: MEDLINE INDUSTRIES, INC.

## (undated) DEVICE — COVER LT HNDL PLAS RIG 1 PER PK

## (undated) DEVICE — ZIMMER® STERILE DISPOSABLE TOURNIQUET CUFF WITH PLC, DUAL PORT, SINGLE BLADDER, 42 IN. (107 CM)

## (undated) DEVICE — SPONGE LAP W18XL18IN WHT COT 4 PLY FLD STRUNG RADPQ DISP ST 2 PER PACK

## (undated) DEVICE — BANDAGE,GAUZE,BULKEE II,4.5"X4.1YD,STRL: Brand: MEDLINE

## (undated) DEVICE — 2108 SERIES SAGITTAL BLADE AGGRESSIVE  (25.0 X 1.19 X 85.0MM)

## (undated) DEVICE — TOTAL JOINT - SMH: Brand: MEDLINE INDUSTRIES, INC.

## (undated) DEVICE — SUTURE NONABSORBABLE MONOFILAMENT 3-0 PS-1 18 IN BLK ETHILON 1663H

## (undated) DEVICE — MASTISOL ADHESIVE LIQ 2/3ML

## (undated) DEVICE — PADDING CAST W6INXL4YD NONSTERILE COT RAYON MICROPLEATED

## (undated) DEVICE — SYRINGE MED 10ML LUERLOCK TIP W/O SFTY DISP

## (undated) DEVICE — DRESSING,GAUZE,XEROFORM,CURAD,1"X8",ST: Brand: CURAD

## (undated) DEVICE — SYRINGE 20ML LL S/C 50

## (undated) DEVICE — DRESSING STERILE PETRO W3XL8IN N ADH OIL EMUL GZ CURAD

## (undated) DEVICE — 3M™ STERI-DRAPE™ U-DRAPE 1015: Brand: STERI-DRAPE™

## (undated) DEVICE — CUSTOM CAST PD STR

## (undated) DEVICE — HANDPIECE SET WITH BONE CLEANING TIP AND SUCTION TUBE: Brand: INTERPULSE

## (undated) DEVICE — SCRUBIN SCRUB BRUSH DRY STER: Brand: MEDLINE INDUSTRIES, INC.

## (undated) DEVICE — 450 ML BOTTLE OF 0.05% CHLORHEXIDINE GLUCONATE IN 99.95% STERILE WATER FOR IRRIGATION, USP AND APPLICATOR.: Brand: IRRISEPT ANTIMICROBIAL WOUND LAVAGE

## (undated) DEVICE — BANDAGE,ELASTIC,ESMARK,STERILE,4"X9',LF: Brand: MEDLINE

## (undated) DEVICE — CUFF BLD PRSS AD CLTH SGL TB W/ BAYNT CONN ROUNDED CORNER

## (undated) DEVICE — INTENT OT USE PROVIDES A STERILE INTERFACE BETWEEN THE OPERATING ROOM SURGICAL LAMPS (NON-STERILE) AND THE SURGEON OR STAFF WORKING IN THE STERILE FIELD.: Brand: ASPEN® ALC PLUS LIGHT HANDLE COVER

## (undated) DEVICE — PENCIL SMK EVAC 10 FT BLADE ELECTRD ROCKER FOR TELSCP